# Patient Record
Sex: FEMALE | Race: WHITE | NOT HISPANIC OR LATINO | Employment: OTHER | URBAN - METROPOLITAN AREA
[De-identification: names, ages, dates, MRNs, and addresses within clinical notes are randomized per-mention and may not be internally consistent; named-entity substitution may affect disease eponyms.]

---

## 2023-04-25 ENCOUNTER — OFFICE VISIT (OUTPATIENT)
Dept: PRIMARY CARE | Facility: CLINIC | Age: 78
End: 2023-04-25
Payer: MEDICARE

## 2023-04-25 VITALS — HEART RATE: 109 BPM | SYSTOLIC BLOOD PRESSURE: 87 MMHG | TEMPERATURE: 97.6 F | DIASTOLIC BLOOD PRESSURE: 46 MMHG

## 2023-04-25 DIAGNOSIS — L29.9 PRURITUS: Primary | ICD-10-CM

## 2023-04-25 PROCEDURE — 1159F MED LIST DOCD IN RCRD: CPT | Performed by: INTERNAL MEDICINE

## 2023-04-25 PROCEDURE — 99214 OFFICE O/P EST MOD 30 MIN: CPT | Performed by: INTERNAL MEDICINE

## 2023-04-25 PROCEDURE — 1160F RVW MEDS BY RX/DR IN RCRD: CPT | Performed by: INTERNAL MEDICINE

## 2023-04-25 RX ORDER — DULOXETINE HYDROCHLORIDE 20 MG/1
CAPSULE, DELAYED RELEASE ORAL
COMMUNITY
End: 2023-12-20 | Stop reason: HOSPADM

## 2023-04-25 RX ORDER — PREDNISONE 20 MG/1
40 TABLET ORAL DAILY
Qty: 10 TABLET | Refills: 0 | Status: SHIPPED | OUTPATIENT
Start: 2023-04-25 | End: 2023-04-30

## 2023-04-25 RX ORDER — TOPIRAMATE 100 MG/1
1 TABLET, FILM COATED ORAL 2 TIMES DAILY
COMMUNITY
Start: 2021-09-30 | End: 2023-11-16

## 2023-04-25 RX ORDER — GABAPENTIN 800 MG/1
1 TABLET ORAL 3 TIMES DAILY
COMMUNITY
Start: 2022-02-24 | End: 2023-12-20 | Stop reason: HOSPADM

## 2023-04-25 RX ORDER — DIPHENHYDRAMINE HCL 25 MG
25 TABLET ORAL EVERY 6 HOURS
Qty: 60 TABLET | Refills: 1 | Status: SHIPPED | OUTPATIENT
Start: 2023-04-25 | End: 2023-12-20 | Stop reason: HOSPADM

## 2023-04-25 NOTE — PROGRESS NOTES
Subjective     Patient ID: Roby Qureshi is a 78 y.o. female who presents for itching body.  HPI    78F pt of Dr. Crespo here out of concern for diffuse pruritus. She states for the last 6-7 days she has been complaining of severe pruritus, that started on her head and went to her body, cannot even wear a bra. She presented to the ED on 4/16 out of concern for exacerbation of chronic back pain subsequently discharged. She cannot recall any inciting event, though she does note that her  may have changed the detergent of her laundry. She tried to bathe though without any improvement in her symptoms. There are no rashes that she could appreciate, does not live with anyone at home with similar rashes, has never experienced this in the past. Pruritus is severe and keeping her awake.  Denies constitutional symptoms, recent travel, changes in medications or substance abuse.     PMHx:  - Chronic constipation/fecal incontinence - overflow - seen by Dr. Baer thought likely overflow after spinal injury and subsequent fusion   - Scatica with history of multiple lumbar fusion surgeries on gabapentin, topamax,   - Depression/bipolar disorder on cymbalta 20mg     Social:   - NO alcohol tobacco, or drug use   - Lives at home alone with ck   - Daughter in [place], son in [place]. Friends are used as support.     ROS:  Review of systems as stated above, otherwise unremarkable.    Objective   Physical Exam  General: Alert and oriented, extremely uncomfortable in appearance   HEENT: No conjunctival erythema, no external facial lesions, thyroid exam unremarkable, moist mucus membranes no e/o oral lesions or pharygneal erythema.   Lungs: Breathing comfortably, no wheezes.   Skin: No rashes appreciated, diffuse excoriations appreciated on torso, upper back, upper arms and thighs without exudate or bleeding, no e/o active infection   Neuro: AAO x 3, answering questions appropriately, no obvious cranial nerve  deficits    Assessment/Plan   Problem List Items Addressed This Visit          Nervous    Pruritus - Primary     Unclear etiology possible change in her detergent no overt evidence of rash. She had recent bloodwork performed on 4/16 without concerning abnormalities   Will rx benadryl and prednisone for now, refer to dermatology.   If symptoms fail to improve with above measures would pursue further workup including thyroid function testing.          Relevant Medications    diphenhydrAMINE (Sominex) 25 mg tablet    predniSONE (Deltasone) 20 mg tablet    Other Relevant Orders    Referral to Dermatology

## 2023-04-25 NOTE — ASSESSMENT & PLAN NOTE
Unclear etiology possible change in her detergent no overt evidence of rash. She had recent bloodwork performed on 4/16 without concerning abnormalities   Will rx benadryl and prednisone for now, refer to dermatology.   If symptoms fail to improve with above measures would pursue further workup including thyroid function testing.

## 2023-05-15 ENCOUNTER — HOSPITAL ENCOUNTER (OUTPATIENT)
Dept: DATA CONVERSION | Facility: HOSPITAL | Age: 78
End: 2023-05-15
Attending: ANESTHESIOLOGY
Payer: COMMERCIAL

## 2023-05-15 DIAGNOSIS — M54.16 RADICULOPATHY, LUMBAR REGION: ICD-10-CM

## 2023-05-15 DIAGNOSIS — G47.30 SLEEP APNEA, UNSPECIFIED: ICD-10-CM

## 2023-05-15 DIAGNOSIS — M96.1 POSTLAMINECTOMY SYNDROME, NOT ELSEWHERE CLASSIFIED: ICD-10-CM

## 2023-07-24 ENCOUNTER — HOSPITAL ENCOUNTER (OUTPATIENT)
Dept: DATA CONVERSION | Facility: HOSPITAL | Age: 78
End: 2023-07-24
Attending: ANESTHESIOLOGY
Payer: COMMERCIAL

## 2023-07-24 DIAGNOSIS — M54.16 RADICULOPATHY, LUMBAR REGION: ICD-10-CM

## 2023-07-24 DIAGNOSIS — M96.1 POSTLAMINECTOMY SYNDROME, NOT ELSEWHERE CLASSIFIED: ICD-10-CM

## 2023-07-24 DIAGNOSIS — M51.16 INTERVERTEBRAL DISC DISORDERS WITH RADICULOPATHY, LUMBAR REGION: ICD-10-CM

## 2023-10-02 NOTE — OP NOTE
Post Operative Note:     Post-Procedure Diagnosis: Lumbar radiculitis, failed  back surgery syndrome, degenerative disc disease   Procedure: 1.   2.   3.   4.   5.   Surgeon: Luis   Resident/Fellow/Other Assistant: Carlos   Estimated Blood Loss (mL): none   Specimen: no   Findings: None     Operative Report Dictated:  Dictation: not applicable - note contains Operative  Report   Operative Report:    Preoperative diagnosis:  Lumbar radiculitis  Postoperative diagnosis:  Lumbar radiculitis, failed back surgery syndrome, lumbar disc disease  Procedure: Right-sided L5 lumbar transforaminal epidural steroid injection under fluoroscopic guidance  Surgeon: Brook Smith  Assistant:  Fellow  Anesthesia: Local, IV sedation    Complications: Apparently none    Clinical note: Roby is a 78-year-old female with a history of back pain and leg pain.  The patient has mostly had pain in the L5 distribution.  Based on her scan she did have a right-sided disc  issue at T12-L1 and she has some narrowing and disc disease at L5-S1.    Procedure note: The patient was met in the preoperative holding area after risks benefits and alternatives to procedure were discussed with the patient, informed consent was obtained. Patient brought back to the procedure room and placed in the prone  position on the fluoroscopy table. Area over the back was exposed, prepped, draped, in the usual sterile fashion.  Skin and subcutaneous tissues to the neuroforamen was anesthetized using 0.5% lidocaine.   Initially 22-gauge Sprotte needles were inserted in the skin and advanced  into the foramen on the right both at T12 and L5. Needle tip position was confirmed in AP oblique and lateral view.  Contrast was injected  which showed appropriate epidural spread at L5-T12 was little bit more lateral.  There was no intravascular or intrathecal uptake.   Patient noted that her exact symptoms were reproduced with the medication at L5 and therefore I elected  to just inject all the medication at this location.  A total of  2 mL of 0.5% lidocaine mixed with 10 mg dexamethasone was injected. Needle removed, bandage applied, patient tolerated the procedure well with no immediate complications.      Attestation:   Note Completion:  Attending Attestation I was present for the entire procedure         Electronic Signatures:  Brook Smith)  (Signed 24-Jul-2023 12:12)   Authored: Post Operative Note, Note Completion      Last Updated: 24-Jul-2023 12:12 by Brook Smith)

## 2023-10-02 NOTE — OP NOTE
Post Operative Note:     Post-Procedure Diagnosis: Failed back surgery syndrome,  lumbar   Procedure: 1.   2.   3.   4.   5.   Surgeon: Luis   Resident/Fellow/Other Assistant: Young   Estimated Blood Loss (mL): none   Specimen: no   Findings: None     Operative Report Dictated:  Dictation: not applicable - note contains Operative  Report   Operative Report:      Preoperative diagnosis/postoperative diagnosis: Failed back surgery syndrome, lumbar radiculitis  Procedure: Caudal epidural steroid injection under fluoroscopic guidance  Surgeon: Brook Smith  Assistant:  Fellow  Anesthesia: Local, IV sedation  Complications: Apparently none    Clinical note: Roby is a 78-year-old female with a history of back and leg symptoms.  The patient has had prior epidural injections with significant relief.  She is here today for repeat procedure.    Procedure note: The patient was met in the preoperative holding area after risks benefits and alternatives to procedure were discussed with the patient, informed consent was obtained. Patient brought back to the procedure room and placed in the prone  position on the fluoroscopy table. Area over low back and caudal space was exposed, prepped, draped, in the usual sterile fashion.  Sacral hiatus identified. Skin and subcutaneous tissues to the aforementioned location was anesthetized using 0.5% lidocaine.   23-gauge spinal needle was inserted in the skin and advanced.  Contrast was injected which showed appropriate epidural spread, no intravascular or intrathecal uptake. A total of 4 mL's of 1 % percent lidocaine mixed 5 mL of preservative-free normal saline and 40 mg of methylprednisolone was injected. Needle removed, bandage  applied, patient tolerated the procedure well with no immediate complications.      Attestation:   Note Completion:  Attending Attestation I was present for the entire procedure         Electronic Signatures:  Brook Smith)  (Signed  15-May-2023 10:15)   Authored: Post Operative Note, Note Completion      Last Updated: 15-May-2023 10:15 by Brook Smith)

## 2023-11-01 ENCOUNTER — TELEPHONE (OUTPATIENT)
Dept: RADIOLOGY | Facility: HOSPITAL | Age: 78
End: 2023-11-01

## 2023-11-01 DIAGNOSIS — M54.16 LUMBAR RADICULOPATHY: ICD-10-CM

## 2023-11-11 PROBLEM — M96.0 PSEUDARTHROSIS FOLLOWING SPINAL FUSION: Status: ACTIVE | Noted: 2023-11-11

## 2023-11-11 PROBLEM — S32.009K LUMBAR PSEUDOARTHROSIS: Status: ACTIVE | Noted: 2020-06-12

## 2023-11-11 PROBLEM — M54.10 RADICULOPATHY WITH LOWER EXTREMITY SYMPTOMS: Status: ACTIVE | Noted: 2023-11-11

## 2023-11-11 PROBLEM — R00.0 TACHYCARDIA: Status: ACTIVE | Noted: 2023-11-11

## 2023-11-11 PROBLEM — G57.90 NEUROPATHY, LEG: Status: ACTIVE | Noted: 2023-11-11

## 2023-11-11 PROBLEM — R53.83 MALAISE AND FATIGUE: Status: ACTIVE | Noted: 2023-11-11

## 2023-11-11 PROBLEM — R73.9 BORDERLINE HYPERGLYCEMIA: Status: ACTIVE | Noted: 2023-11-11

## 2023-11-11 PROBLEM — R53.83 FATIGUE: Status: ACTIVE | Noted: 2020-02-14

## 2023-11-11 PROBLEM — E44.1 MILD PROTEIN-CALORIE MALNUTRITION (MULTI): Status: ACTIVE | Noted: 2020-09-27

## 2023-11-11 PROBLEM — R15.9 INCONTINENCE OF FECES: Status: ACTIVE | Noted: 2017-01-26

## 2023-11-11 PROBLEM — K52.9 CHRONIC DIARRHEA: Status: ACTIVE | Noted: 2023-11-11

## 2023-11-11 PROBLEM — F31.0: Status: ACTIVE | Noted: 2023-11-11

## 2023-11-11 PROBLEM — M54.31 SCIATICA OF RIGHT SIDE: Status: ACTIVE | Noted: 2020-09-21

## 2023-11-11 PROBLEM — N39.3 STRESS INCONTINENCE, FEMALE: Status: ACTIVE | Noted: 2023-11-11

## 2023-11-11 PROBLEM — R53.81 MALAISE AND FATIGUE: Status: ACTIVE | Noted: 2023-11-11

## 2023-11-11 PROBLEM — G62.9 PERIPHERAL NERVE DISEASE: Status: ACTIVE | Noted: 2020-02-14

## 2023-11-11 PROBLEM — R30.0 DYSURIA: Status: ACTIVE | Noted: 2023-11-11

## 2023-11-11 PROBLEM — G89.29 CHRONIC PAIN: Status: ACTIVE | Noted: 2020-11-23

## 2023-11-11 PROBLEM — N93.9 VAGINAL BLEEDING: Status: ACTIVE | Noted: 2023-11-11

## 2023-11-11 PROBLEM — R45.851 SUICIDAL IDEATION: Status: ACTIVE | Noted: 2020-09-25

## 2023-11-11 PROBLEM — M54.18 RADICULOPATHY, SACRAL AND SACROCOCCYGEAL REGION: Status: ACTIVE | Noted: 2023-11-11

## 2023-11-11 PROBLEM — R26.9 ABNORMAL GAIT: Status: ACTIVE | Noted: 2020-02-14

## 2023-11-11 PROBLEM — M48.02 SPINAL STENOSIS OF CERVICAL REGION: Status: ACTIVE | Noted: 2020-09-26

## 2023-11-11 PROBLEM — K59.09 CHRONIC CONSTIPATION: Status: ACTIVE | Noted: 2020-02-14

## 2023-11-11 PROBLEM — T81.9XXA COMPLICATION OF SURGICAL PROCEDURE: Status: ACTIVE | Noted: 2023-11-11

## 2023-11-11 PROBLEM — M54.16 RIGHT LUMBAR RADICULITIS: Status: ACTIVE | Noted: 2023-11-11

## 2023-11-11 PROBLEM — K92.1 BLOODY STOOL: Status: ACTIVE | Noted: 2023-11-11

## 2023-11-11 PROBLEM — R42 DIZZINESS: Status: ACTIVE | Noted: 2023-11-11

## 2023-11-11 PROBLEM — F45.42 PAIN DISORDER ASSOCIATED WITH PSYCHOLOGICAL AND PHYSICAL FACTORS: Status: ACTIVE | Noted: 2023-11-11

## 2023-11-11 PROBLEM — E78.5 DYSLIPIDEMIA: Status: ACTIVE | Noted: 2023-11-11

## 2023-11-11 PROBLEM — M21.371 RIGHT FOOT DROP: Status: ACTIVE | Noted: 2020-02-14

## 2023-11-11 PROBLEM — I47.10 PAROXYSMAL SVT (SUPRAVENTRICULAR TACHYCARDIA) (CMS-HCC): Status: ACTIVE | Noted: 2023-11-11

## 2023-11-11 RX ORDER — METOPROLOL SUCCINATE 25 MG/1
1 TABLET, EXTENDED RELEASE ORAL DAILY
COMMUNITY
End: 2024-04-18

## 2023-11-11 RX ORDER — TIZANIDINE 4 MG/1
.5-1 TABLET ORAL 3 TIMES DAILY
COMMUNITY
Start: 2021-03-02 | End: 2024-01-14 | Stop reason: ENTERED-IN-ERROR

## 2023-11-11 RX ORDER — HYDROXYZINE HYDROCHLORIDE 50 MG/1
100 TABLET, FILM COATED ORAL 2 TIMES DAILY
COMMUNITY
End: 2024-01-14 | Stop reason: ENTERED-IN-ERROR

## 2023-11-11 RX ORDER — ZOLPIDEM TARTRATE 10 MG/1
1 TABLET ORAL NIGHTLY
Status: ON HOLD | COMMUNITY
Start: 2019-12-31 | End: 2023-12-20 | Stop reason: SDUPTHER

## 2023-11-11 RX ORDER — CHLORZOXAZONE 500 MG/1
1 TABLET ORAL 3 TIMES DAILY PRN
COMMUNITY
Start: 2021-06-22 | End: 2024-01-14 | Stop reason: ENTERED-IN-ERROR

## 2023-11-11 RX ORDER — VENLAFAXINE HYDROCHLORIDE 150 MG/1
300 TABLET, EXTENDED RELEASE ORAL
COMMUNITY
Start: 2019-11-10 | End: 2024-01-14 | Stop reason: ENTERED-IN-ERROR

## 2023-11-11 RX ORDER — CYCLOBENZAPRINE HCL 10 MG
1 TABLET ORAL EVERY 8 HOURS
COMMUNITY
Start: 2023-04-16 | End: 2023-12-20 | Stop reason: HOSPADM

## 2023-11-11 RX ORDER — IBUPROFEN 400 MG/1
1 TABLET ORAL EVERY 6 HOURS
COMMUNITY
Start: 2023-04-16 | End: 2024-01-24 | Stop reason: HOSPADM

## 2023-11-11 RX ORDER — HYDROXYZINE HYDROCHLORIDE 50 MG/1
2 TABLET, FILM COATED ORAL NIGHTLY PRN
Status: ON HOLD | COMMUNITY
Start: 2019-12-18 | End: 2023-12-29 | Stop reason: ENTERED-IN-ERROR

## 2023-11-11 RX ORDER — HYDROXYZINE PAMOATE 25 MG/1
25 CAPSULE ORAL DAILY PRN
Status: ON HOLD | COMMUNITY
Start: 2017-02-15 | End: 2023-12-29 | Stop reason: ENTERED-IN-ERROR

## 2023-11-11 RX ORDER — DULOXETIN HYDROCHLORIDE 60 MG/1
1 CAPSULE, DELAYED RELEASE ORAL EVERY MORNING
COMMUNITY
Start: 2022-05-20 | End: 2024-01-14 | Stop reason: ENTERED-IN-ERROR

## 2023-11-11 RX ORDER — METHYLCELLULOSE 500 MG/1
2 TABLET ORAL NIGHTLY
COMMUNITY
Start: 2022-09-06 | End: 2024-01-24 | Stop reason: HOSPADM

## 2023-11-13 ENCOUNTER — APPOINTMENT (OUTPATIENT)
Dept: RADIOLOGY | Facility: HOSPITAL | Age: 78
End: 2023-11-13
Payer: COMMERCIAL

## 2023-11-14 ENCOUNTER — HOSPITAL ENCOUNTER (EMERGENCY)
Facility: HOSPITAL | Age: 78
Discharge: HOME | End: 2023-11-14
Attending: EMERGENCY MEDICINE
Payer: COMMERCIAL

## 2023-11-14 ENCOUNTER — APPOINTMENT (OUTPATIENT)
Dept: RADIOLOGY | Facility: HOSPITAL | Age: 78
End: 2023-11-14
Payer: COMMERCIAL

## 2023-11-14 VITALS
OXYGEN SATURATION: 98 % | BODY MASS INDEX: 27.7 KG/M2 | SYSTOLIC BLOOD PRESSURE: 114 MMHG | HEIGHT: 60 IN | HEART RATE: 82 BPM | WEIGHT: 141.09 LBS | RESPIRATION RATE: 18 BRPM | DIASTOLIC BLOOD PRESSURE: 59 MMHG | TEMPERATURE: 97.5 F

## 2023-11-14 DIAGNOSIS — R19.7 DIARRHEA, UNSPECIFIED TYPE: Primary | ICD-10-CM

## 2023-11-14 LAB
ALBUMIN SERPL BCP-MCNC: 4.2 G/DL (ref 3.4–5)
ALP SERPL-CCNC: 96 U/L (ref 33–136)
ALT SERPL W P-5'-P-CCNC: 12 U/L (ref 7–45)
ANION GAP BLDV CALCULATED.4IONS-SCNC: 12 MMOL/L (ref 10–25)
ANION GAP SERPL CALC-SCNC: 13 MMOL/L (ref 10–20)
APTT PPP: 49 SECONDS (ref 27–38)
AST SERPL W P-5'-P-CCNC: 18 U/L (ref 9–39)
BASE EXCESS BLDV CALC-SCNC: -2.8 MMOL/L (ref -2–3)
BASOPHILS # BLD AUTO: 0.04 X10*3/UL (ref 0–0.1)
BASOPHILS NFR BLD AUTO: 0.4 %
BILIRUB SERPL-MCNC: 0.6 MG/DL (ref 0–1.2)
BODY TEMPERATURE: 37 DEGREES CELSIUS
BUN SERPL-MCNC: 20 MG/DL (ref 6–23)
CA-I BLDV-SCNC: 1.07 MMOL/L (ref 1.1–1.33)
CALCIUM SERPL-MCNC: 8.7 MG/DL (ref 8.6–10.3)
CARDIAC TROPONIN I PNL SERPL HS: 32 NG/L (ref 0–13)
CHLORIDE BLDV-SCNC: 105 MMOL/L (ref 98–107)
CHLORIDE SERPL-SCNC: 105 MMOL/L (ref 98–107)
CO2 SERPL-SCNC: 24 MMOL/L (ref 21–32)
CREAT SERPL-MCNC: 0.65 MG/DL (ref 0.5–1.05)
EOSINOPHIL # BLD AUTO: 0.1 X10*3/UL (ref 0–0.4)
EOSINOPHIL NFR BLD AUTO: 0.9 %
ERYTHROCYTE [DISTWIDTH] IN BLOOD BY AUTOMATED COUNT: 14.6 % (ref 11.5–14.5)
FLUAV RNA RESP QL NAA+PROBE: NOT DETECTED
FLUBV RNA RESP QL NAA+PROBE: NOT DETECTED
GFR SERPL CREATININE-BSD FRML MDRD: >90 ML/MIN/1.73M*2
GLUCOSE BLDV-MCNC: 92 MG/DL (ref 74–99)
GLUCOSE SERPL-MCNC: 87 MG/DL (ref 74–99)
HCO3 BLDV-SCNC: 23.2 MMOL/L (ref 22–26)
HCT VFR BLD AUTO: 43.3 % (ref 36–46)
HCT VFR BLD EST: 42 % (ref 36–46)
HGB BLD-MCNC: 14 G/DL (ref 12–16)
HGB BLDV-MCNC: 13.9 G/DL (ref 12–16)
IMM GRANULOCYTES # BLD AUTO: 0.04 X10*3/UL (ref 0–0.5)
IMM GRANULOCYTES NFR BLD AUTO: 0.4 % (ref 0–0.9)
INHALED O2 CONCENTRATION: 21 %
INR PPP: 1.1 (ref 0.9–1.1)
LACTATE BLDV-SCNC: 1.8 MMOL/L (ref 0.4–2)
LIPASE SERPL-CCNC: 15 U/L (ref 9–82)
LYMPHOCYTES # BLD AUTO: 3.33 X10*3/UL (ref 0.8–3)
LYMPHOCYTES NFR BLD AUTO: 30.9 %
MCH RBC QN AUTO: 29.2 PG (ref 26–34)
MCHC RBC AUTO-ENTMCNC: 32.3 G/DL (ref 32–36)
MCV RBC AUTO: 90 FL (ref 80–100)
MONOCYTES # BLD AUTO: 0.82 X10*3/UL (ref 0.05–0.8)
MONOCYTES NFR BLD AUTO: 7.6 %
NEUTROPHILS # BLD AUTO: 6.46 X10*3/UL (ref 1.6–5.5)
NEUTROPHILS NFR BLD AUTO: 59.8 %
NRBC BLD-RTO: 0 /100 WBCS (ref 0–0)
OXYHGB MFR BLDV: 49.6 % (ref 45–75)
PCO2 BLDV: 44 MM HG (ref 41–51)
PH BLDV: 7.33 PH (ref 7.33–7.43)
PLATELET # BLD AUTO: 269 X10*3/UL (ref 150–450)
PO2 BLDV: 35 MM HG (ref 35–45)
POTASSIUM BLDV-SCNC: 3.9 MMOL/L (ref 3.5–5.3)
POTASSIUM SERPL-SCNC: 4.1 MMOL/L (ref 3.5–5.3)
PROT SERPL-MCNC: 7.1 G/DL (ref 6.4–8.2)
PROTHROMBIN TIME: 12.9 SECONDS (ref 9.8–12.8)
RBC # BLD AUTO: 4.79 X10*6/UL (ref 4–5.2)
SAO2 % BLDV: 51 % (ref 45–75)
SARS-COV-2 RNA RESP QL NAA+PROBE: NOT DETECTED
SODIUM BLDV-SCNC: 136 MMOL/L (ref 136–145)
SODIUM SERPL-SCNC: 138 MMOL/L (ref 136–145)
WBC # BLD AUTO: 10.8 X10*3/UL (ref 4.4–11.3)

## 2023-11-14 PROCEDURE — 2500000004 HC RX 250 GENERAL PHARMACY W/ HCPCS (ALT 636 FOR OP/ED): Performed by: EMERGENCY MEDICINE

## 2023-11-14 PROCEDURE — 84484 ASSAY OF TROPONIN QUANT: CPT | Performed by: EMERGENCY MEDICINE

## 2023-11-14 PROCEDURE — 85025 COMPLETE CBC W/AUTO DIFF WBC: CPT | Performed by: EMERGENCY MEDICINE

## 2023-11-14 PROCEDURE — 99284 EMERGENCY DEPT VISIT MOD MDM: CPT | Mod: 25

## 2023-11-14 PROCEDURE — 36415 COLL VENOUS BLD VENIPUNCTURE: CPT | Performed by: EMERGENCY MEDICINE

## 2023-11-14 PROCEDURE — 2500000001 HC RX 250 WO HCPCS SELF ADMINISTERED DRUGS (ALT 637 FOR MEDICARE OP): Performed by: EMERGENCY MEDICINE

## 2023-11-14 PROCEDURE — 85730 THROMBOPLASTIN TIME PARTIAL: CPT | Performed by: EMERGENCY MEDICINE

## 2023-11-14 PROCEDURE — 74177 CT ABD & PELVIS W/CONTRAST: CPT | Performed by: RADIOLOGY

## 2023-11-14 PROCEDURE — 74177 CT ABD & PELVIS W/CONTRAST: CPT

## 2023-11-14 PROCEDURE — 84295 ASSAY OF SERUM SODIUM: CPT | Performed by: EMERGENCY MEDICINE

## 2023-11-14 PROCEDURE — 83690 ASSAY OF LIPASE: CPT | Performed by: EMERGENCY MEDICINE

## 2023-11-14 PROCEDURE — 87636 SARSCOV2 & INF A&B AMP PRB: CPT | Performed by: EMERGENCY MEDICINE

## 2023-11-14 PROCEDURE — 82330 ASSAY OF CALCIUM: CPT | Performed by: EMERGENCY MEDICINE

## 2023-11-14 PROCEDURE — 85610 PROTHROMBIN TIME: CPT | Performed by: EMERGENCY MEDICINE

## 2023-11-14 PROCEDURE — 99285 EMERGENCY DEPT VISIT HI MDM: CPT | Mod: 25 | Performed by: EMERGENCY MEDICINE

## 2023-11-14 PROCEDURE — 96361 HYDRATE IV INFUSION ADD-ON: CPT

## 2023-11-14 PROCEDURE — 2550000001 HC RX 255 CONTRASTS: Performed by: EMERGENCY MEDICINE

## 2023-11-14 PROCEDURE — 96375 TX/PRO/DX INJ NEW DRUG ADDON: CPT

## 2023-11-14 PROCEDURE — 96374 THER/PROPH/DIAG INJ IV PUSH: CPT | Mod: 59

## 2023-11-14 RX ORDER — ONDANSETRON HYDROCHLORIDE 2 MG/ML
4 INJECTION, SOLUTION INTRAVENOUS ONCE
Status: DISCONTINUED | OUTPATIENT
Start: 2023-11-14 | End: 2023-11-14 | Stop reason: HOSPADM

## 2023-11-14 RX ORDER — ONDANSETRON HYDROCHLORIDE 2 MG/ML
4 INJECTION, SOLUTION INTRAVENOUS ONCE
Status: COMPLETED | OUTPATIENT
Start: 2023-11-14 | End: 2023-11-14

## 2023-11-14 RX ORDER — OXYMETAZOLINE HCL 0.05 %
2 SPRAY, NON-AEROSOL (ML) NASAL ONCE
Status: COMPLETED | OUTPATIENT
Start: 2023-11-14 | End: 2023-11-14

## 2023-11-14 RX ORDER — MORPHINE SULFATE 4 MG/ML
4 INJECTION, SOLUTION INTRAMUSCULAR; INTRAVENOUS ONCE
Status: COMPLETED | OUTPATIENT
Start: 2023-11-14 | End: 2023-11-14

## 2023-11-14 RX ADMIN — MORPHINE SULFATE 4 MG: 4 INJECTION, SOLUTION INTRAMUSCULAR; INTRAVENOUS at 11:25

## 2023-11-14 RX ADMIN — IOHEXOL 75 ML: 350 INJECTION, SOLUTION INTRAVENOUS at 10:34

## 2023-11-14 RX ADMIN — OXYMETAZOLINE HYDROCHLORIDE 2 SPRAY: 0.05 SPRAY NASAL at 11:15

## 2023-11-14 RX ADMIN — ONDANSETRON 4 MG: 2 INJECTION INTRAMUSCULAR; INTRAVENOUS at 08:29

## 2023-11-14 RX ADMIN — SODIUM CHLORIDE 1000 ML: 9 INJECTION, SOLUTION INTRAVENOUS at 08:28

## 2023-11-14 ASSESSMENT — PAIN SCALES - GENERAL
PAINLEVEL_OUTOF10: 7
PAINLEVEL_OUTOF10: 4
PAINLEVEL_OUTOF10: 0 - NO PAIN

## 2023-11-14 ASSESSMENT — PAIN - FUNCTIONAL ASSESSMENT
PAIN_FUNCTIONAL_ASSESSMENT: 0-10

## 2023-11-14 ASSESSMENT — PAIN DESCRIPTION - LOCATION: LOCATION: BACK

## 2023-11-14 ASSESSMENT — PAIN DESCRIPTION - ORIENTATION: ORIENTATION: MID

## 2023-11-14 NOTE — ED PROVIDER NOTES
HPI   Chief Complaint   Patient presents with    Diarrhea     She has had flu symptoms x2 days last night she started with diarrhea       This is a 78-year-old woman with past medical history chronic constipation, sciatica, depression who does present with complaint of 2 days of feeling ill and then developing diarrhea for the last day.  Denies any cough.  Denies any chest pain.  Does have some diffuse abdominal cramping.  No dysuria hematuria flank pain.  No travel history.                        No data recorded                Patient History   Past Medical History:   Diagnosis Date    Personal history of other diseases of the respiratory system 09/14/2020    History of acute bronchitis    Unspecified fall, initial encounter 12/02/2021    Accidental fall, initial encounter     Past Surgical History:   Procedure Laterality Date    OTHER SURGICAL HISTORY  01/08/2020    Spinal surgery    OTHER SURGICAL HISTORY  01/08/2020    Hysterectomy    OTHER SURGICAL HISTORY  01/08/2020    Femur fracture repair     Family History   Problem Relation Name Age of Onset    Other (Cardiac Disorder) Mother          CABG; Defribillator in place    Coronary artery disease Mother      Other (Cardiac Disorder) Father          CABG; Defribillator in place    Coronary artery disease Father      Heart attack Father  54    Ovarian cancer Maternal Grandmother      Alzheimer's disease Other Aunt      Social History     Tobacco Use    Smoking status: Never    Smokeless tobacco: Never   Substance Use Topics    Alcohol use: Not Currently    Drug use: Never       Physical Exam   ED Triage Vitals [11/14/23 0724]   Temp Heart Rate Resp BP   36.4 °C (97.5 °F) 56 16 100/53      SpO2 Temp src Heart Rate Source Patient Position   -- -- -- --      BP Location FiO2 (%)     -- --       Physical Exam  Vitals and nursing note reviewed.   Constitutional:       Appearance: Normal appearance. She is obese. She is not toxic-appearing.   HENT:      Head:  Normocephalic and atraumatic.      Nose: Nose normal.      Mouth/Throat:      Mouth: Mucous membranes are dry.      Pharynx: Oropharynx is clear.   Eyes:      Extraocular Movements: Extraocular movements intact.      Conjunctiva/sclera: Conjunctivae normal.      Pupils: Pupils are equal, round, and reactive to light.   Cardiovascular:      Rate and Rhythm: Normal rate and regular rhythm.      Pulses: Normal pulses.      Heart sounds: Normal heart sounds.   Pulmonary:      Effort: Pulmonary effort is normal.      Breath sounds: Normal breath sounds.   Abdominal:      General: Abdomen is flat. There is no distension.      Tenderness: There is abdominal tenderness. There is no right CVA tenderness, left CVA tenderness, guarding or rebound.      Hernia: No hernia is present.   Musculoskeletal:         General: Normal range of motion.      Cervical back: Normal range of motion and neck supple.   Skin:     General: Skin is warm and dry.      Capillary Refill: Capillary refill takes less than 2 seconds.   Neurological:      General: No focal deficit present.      Mental Status: She is alert and oriented to person, place, and time. Mental status is at baseline.   Psychiatric:         Mood and Affect: Mood normal.         Behavior: Behavior normal.         Thought Content: Thought content normal.         ED Course & MDM   Diagnoses as of 11/17/23 1029   Diarrhea, unspecified type       Medical Decision Making  IV is placed and labs are drawn including CBC, CMP, lipase, UA, CT scan of the abdomen pelvis.  COVID and flu swabs are sent.  She is started on normal saline bolus.  Morphine 4 mg IV as well as Zofran 4 mg IV are provided.  COVID and flu swabs were negative.  There is no elevated white blood cell count for systemic infection.  There is no clinically significant anemia.There was cholelithiasis noted on the CT scan.  There was indeterminate lesion in the left kidney which may represent cyst.  She was advised of both of  these results with plan to follow-up with primary care physician.  Return precautions were discussed.  Her CMP did not show any acute kidney injury.  Troponin was noted at 32 however no acute chest pain or palpitations or shortness of breath.  No signs for acute ACS.      EKG showed normal sinus rhythm at a rate of 94 with no acute ischemic changes with some PACs.  No STEMI.  No s A-fib        Procedure  Procedures     Wesley Valadez MD  11/17/23 1874

## 2023-11-14 NOTE — DISCHARGE INSTRUCTIONS
Please use Imodium as directed to help with diarrhea for the next 1 to 2 days.  Please return ED for worsening pain, fever, vomiting or any other concerning symptoms.

## 2023-11-16 ENCOUNTER — APPOINTMENT (OUTPATIENT)
Dept: RADIOLOGY | Facility: HOSPITAL | Age: 78
End: 2023-11-16
Payer: COMMERCIAL

## 2023-11-16 ENCOUNTER — HOSPITAL ENCOUNTER (EMERGENCY)
Facility: HOSPITAL | Age: 78
Discharge: HOME | End: 2023-11-16
Attending: INTERNAL MEDICINE
Payer: COMMERCIAL

## 2023-11-16 VITALS
HEIGHT: 60 IN | RESPIRATION RATE: 18 BRPM | DIASTOLIC BLOOD PRESSURE: 70 MMHG | OXYGEN SATURATION: 97 % | WEIGHT: 140 LBS | HEART RATE: 76 BPM | SYSTOLIC BLOOD PRESSURE: 142 MMHG | TEMPERATURE: 98.6 F | BODY MASS INDEX: 27.48 KG/M2

## 2023-11-16 DIAGNOSIS — R10.2 PELVIC PAIN: Primary | ICD-10-CM

## 2023-11-16 LAB
ALBUMIN SERPL BCP-MCNC: 4.2 G/DL (ref 3.4–5)
ALP SERPL-CCNC: 86 U/L (ref 33–136)
ALT SERPL W P-5'-P-CCNC: 15 U/L (ref 7–45)
ANION GAP SERPL CALC-SCNC: 14 MMOL/L (ref 10–20)
AST SERPL W P-5'-P-CCNC: 22 U/L (ref 9–39)
BASOPHILS # BLD AUTO: 0.02 X10*3/UL (ref 0–0.1)
BASOPHILS NFR BLD AUTO: 0.2 %
BILIRUB SERPL-MCNC: 0.4 MG/DL (ref 0–1.2)
BUN SERPL-MCNC: 12 MG/DL (ref 6–23)
CALCIUM SERPL-MCNC: 8.9 MG/DL (ref 8.6–10.3)
CHLORIDE SERPL-SCNC: 109 MMOL/L (ref 98–107)
CO2 SERPL-SCNC: 22 MMOL/L (ref 21–32)
CREAT SERPL-MCNC: 0.6 MG/DL (ref 0.5–1.05)
EOSINOPHIL # BLD AUTO: 0.17 X10*3/UL (ref 0–0.4)
EOSINOPHIL NFR BLD AUTO: 1.7 %
ERYTHROCYTE [DISTWIDTH] IN BLOOD BY AUTOMATED COUNT: 14.6 % (ref 11.5–14.5)
GFR SERPL CREATININE-BSD FRML MDRD: >90 ML/MIN/1.73M*2
GLUCOSE SERPL-MCNC: 85 MG/DL (ref 74–99)
HCT VFR BLD AUTO: 38.9 % (ref 36–46)
HGB BLD-MCNC: 12.6 G/DL (ref 12–16)
IMM GRANULOCYTES # BLD AUTO: 0.02 X10*3/UL (ref 0–0.5)
IMM GRANULOCYTES NFR BLD AUTO: 0.2 % (ref 0–0.9)
LYMPHOCYTES # BLD AUTO: 3.82 X10*3/UL (ref 0.8–3)
LYMPHOCYTES NFR BLD AUTO: 39 %
MCH RBC QN AUTO: 29.3 PG (ref 26–34)
MCHC RBC AUTO-ENTMCNC: 32.4 G/DL (ref 32–36)
MCV RBC AUTO: 91 FL (ref 80–100)
MONOCYTES # BLD AUTO: 0.76 X10*3/UL (ref 0.05–0.8)
MONOCYTES NFR BLD AUTO: 7.8 %
NEUTROPHILS # BLD AUTO: 5 X10*3/UL (ref 1.6–5.5)
NEUTROPHILS NFR BLD AUTO: 51.1 %
NRBC BLD-RTO: 0 /100 WBCS (ref 0–0)
PLATELET # BLD AUTO: 290 X10*3/UL (ref 150–450)
POTASSIUM SERPL-SCNC: 3.7 MMOL/L (ref 3.5–5.3)
PROT SERPL-MCNC: 7.1 G/DL (ref 6.4–8.2)
RBC # BLD AUTO: 4.3 X10*6/UL (ref 4–5.2)
SODIUM SERPL-SCNC: 141 MMOL/L (ref 136–145)
WBC # BLD AUTO: 9.8 X10*3/UL (ref 4.4–11.3)

## 2023-11-16 PROCEDURE — 76856 US EXAM PELVIC COMPLETE: CPT | Mod: FOREIGN READ | Performed by: RADIOLOGY

## 2023-11-16 PROCEDURE — 96374 THER/PROPH/DIAG INJ IV PUSH: CPT

## 2023-11-16 PROCEDURE — 2500000004 HC RX 250 GENERAL PHARMACY W/ HCPCS (ALT 636 FOR OP/ED): Performed by: INTERNAL MEDICINE

## 2023-11-16 PROCEDURE — 76830 TRANSVAGINAL US NON-OB: CPT | Mod: FR

## 2023-11-16 PROCEDURE — 85025 COMPLETE CBC W/AUTO DIFF WBC: CPT | Performed by: INTERNAL MEDICINE

## 2023-11-16 PROCEDURE — 76856 US EXAM PELVIC COMPLETE: CPT | Mod: FR

## 2023-11-16 PROCEDURE — 99284 EMERGENCY DEPT VISIT MOD MDM: CPT | Mod: 25

## 2023-11-16 PROCEDURE — 76830 TRANSVAGINAL US NON-OB: CPT | Mod: XU | Performed by: RADIOLOGY

## 2023-11-16 PROCEDURE — 84075 ASSAY ALKALINE PHOSPHATASE: CPT | Performed by: INTERNAL MEDICINE

## 2023-11-16 PROCEDURE — 99285 EMERGENCY DEPT VISIT HI MDM: CPT | Mod: 25 | Performed by: INTERNAL MEDICINE

## 2023-11-16 PROCEDURE — 36415 COLL VENOUS BLD VENIPUNCTURE: CPT | Performed by: INTERNAL MEDICINE

## 2023-11-16 RX ORDER — ACETAMINOPHEN 325 MG/1
650 TABLET ORAL ONCE
Status: DISCONTINUED | OUTPATIENT
Start: 2023-11-16 | End: 2023-11-16 | Stop reason: HOSPADM

## 2023-11-16 RX ORDER — TOPIRAMATE 100 MG/1
100 TABLET, FILM COATED ORAL 2 TIMES DAILY
Qty: 10 TABLET | Refills: 0 | Status: SHIPPED | OUTPATIENT
Start: 2023-11-16 | End: 2023-11-20 | Stop reason: SDUPTHER

## 2023-11-16 RX ORDER — TOPIRAMATE 100 MG/1
100 TABLET, FILM COATED ORAL ONCE
Status: COMPLETED | OUTPATIENT
Start: 2023-11-16 | End: 2023-11-16

## 2023-11-16 RX ORDER — KETOROLAC TROMETHAMINE 30 MG/ML
15 INJECTION, SOLUTION INTRAMUSCULAR; INTRAVENOUS ONCE
Status: COMPLETED | OUTPATIENT
Start: 2023-11-16 | End: 2023-11-16

## 2023-11-16 RX ADMIN — TOPIRAMATE 100 MG: 100 TABLET, FILM COATED ORAL at 20:11

## 2023-11-16 RX ADMIN — KETOROLAC TROMETHAMINE 15 MG: 30 INJECTION, SOLUTION INTRAMUSCULAR at 18:37

## 2023-11-16 RX ADMIN — SODIUM CHLORIDE 1000 ML: 9 INJECTION, SOLUTION INTRAVENOUS at 18:38

## 2023-11-16 ASSESSMENT — COLUMBIA-SUICIDE SEVERITY RATING SCALE - C-SSRS
6. HAVE YOU EVER DONE ANYTHING, STARTED TO DO ANYTHING, OR PREPARED TO DO ANYTHING TO END YOUR LIFE?: NO
2. HAVE YOU ACTUALLY HAD ANY THOUGHTS OF KILLING YOURSELF?: NO
1. IN THE PAST MONTH, HAVE YOU WISHED YOU WERE DEAD OR WISHED YOU COULD GO TO SLEEP AND NOT WAKE UP?: NO

## 2023-11-16 ASSESSMENT — PAIN SCALES - GENERAL: PAINLEVEL_OUTOF10: 10 - WORST POSSIBLE PAIN

## 2023-11-16 ASSESSMENT — PAIN - FUNCTIONAL ASSESSMENT: PAIN_FUNCTIONAL_ASSESSMENT: 0-10

## 2023-11-16 NOTE — ED PROVIDER NOTES
"HPI     CC: Pelvic Pain (Pt complaining of lower pelvic pain, she states that she feels like her uterus is prolapsing. Was seen here 3 days ago for NVD)     HPI: Roby Qureshi is a 78 y.o. female with a history of chronic constipation, sciatica, depression who presents with 2 weeks of pelvic discomfort.  She endorses pain in her \"crotch\" with suprapubic pressure.  She has a history of a partial hysterectomy and feels like something is wrong with her remaining uterus.  She has had associated diarrhea over the past week.  She was seen in the ED 2 days ago and had a CT of the abdomen which showed the patient is status post hysterectomy no abnormalities noted to the pelvic organs.  She didn't tell them about the pelvic discomfort at the time. She has not taken anything for it.  She denies any dysuria, hematuria, nausea, vomiting, or other symptoms.     ROS: 10-point review of systems was performed and is otherwise negative except as noted in HPI.    Limitations to history: N/A    Independent Historians: N/A    External Records Reviewed: N/A    Past Medical History: Noncontributory except per HPI     Past Surgical History: Noncontributory except per HPI     Family History: Reviewed and noncontributory     Social History:  Denies tobacco. Denies ETOH. Denies illicit drugs.    Social Determinants Affecting Care: N/A    Allergies   Allergen Reactions    Oxycodone Itching       Home Meds:   Current Outpatient Medications   Medication Instructions    chlorzoxazone (Parafon Forte) 500 mg tablet 1 tablet, oral, 3 times daily PRN    cyclobenzaprine (Flexeril) 10 mg tablet 1 tablet, oral, Every 8 hours    Cymbalta 20 mg DR capsule oral    diphenhydrAMINE (BENADryl) 25 mg capsule TAKE 1 CAPSULE BY MOUTH FOUR TIMES A DAY (MORNING,NOON,EVENING AND BEFORE BEDTIME)    diphenhydrAMINE (SOMINEX) 25 mg, oral, Every 6 hours    DULoxetine (Cymbalta) 60 mg DR capsule 1 tablet, oral, Every morning    gabapentin (Neurontin) 800 mg " tablet 1 tablet, oral, 3 times daily    hydrOXYzine HCL (Atarax) 50 mg tablet 2 tablets, oral, Nightly PRN    hydrOXYzine HCL (ATARAX) 100 mg, oral, 2 times daily    hydrOXYzine pamoate (VISTARIL) 25 mg, oral, Daily PRN    ibuprofen 400 mg tablet 1 tablet, oral, Every 6 hours    Lactobacillus acidophilus (PROBIOTIC ORAL) 1 capsule, oral, 2 times daily    methylcellulose, laxative, (CitruceL) 500 mg tablet 2 tablets, oral, Nightly    metoprolol succinate XL (Toprol-XL) 25 mg 24 hr tablet 1 tablet, oral, Daily    tiZANidine (Zanaflex) 4 mg tablet 0.5-1 tablets, oral, 3 times daily    topiramate (TOPAMAX) 100 mg, oral, 2 times daily    venlafaxine 300 mg, oral    zolpidem (Ambien) 10 mg tablet 1 tablet, oral, Nightly        Physical Exam     ED Triage Vitals [11/16/23 1441]   Temp Heart Rate Resp BP   37 °C (98.6 °F) 76 18 139/72      SpO2 Temp src Heart Rate Source Patient Position   98 % -- -- --      BP Location FiO2 (%)     -- --         Heart Rate:  [76]   Temp:  [37 °C (98.6 °F)]   Resp:  [18]   BP: (123-143)/(70-74)   Height:  [152.4 cm (5')]   Weight:  [63.5 kg (140 lb)]   SpO2:  [96 %-98 %]      Physical Exam  Vitals and nursing note reviewed.     CONSTITUTIONAL: Well appearing, well nourished, in no acute distress.   HENMT: Head atraumatic. Airway patent. Nasal mucosa clear. Mouth with normal mucosa, clear oropharynx. Uvula midline. Neck supple.    EYES: Clear bilaterally, pupils equally round and reactive to light.   CARDIOVASCULAR: Normal rate, regular rhythm.  Heart sounds S1, S2.  No murmurs, rubs or gallops. Normal pulses. Capillary refill < 2 sec.   RESPIRATORY: No increased work of breathing. Breath sounds clear and equal bilaterally.  GASTROINTESTINAL: Abdomen soft, non-distended, non-tender. No rebound, no guarding. Normal bowel sounds. No palpable masses.  GENITOURINARY:  No CVA tenderness.  MUSCULOSKELETAL: Spine appears normal, range of motion is not limited, no muscle or joint tenderness. No  edema.   NEUROLOGICAL: Alert and oriented, no asymmetry, moving all extremities equally.  SKIN: Warm, dry and intact. No rash or notable lesions.  PSYCHIATRIC: Normal mood and affect.  HEME/LYMPH: No adenopathy or splenomegaly.    Diagnostic Results      ECG: ECGs read and interpreted by me. See ED Course, below, for interpretation.    Labs Reviewed   CBC WITH AUTO DIFFERENTIAL - Abnormal       Result Value    WBC 9.8      nRBC 0.0      RBC 4.30      Hemoglobin 12.6      Hematocrit 38.9      MCV 91      MCH 29.3      MCHC 32.4      RDW 14.6 (*)     Platelets 290      Neutrophils % 51.1      Immature Granulocytes %, Automated 0.2      Lymphocytes % 39.0      Monocytes % 7.8      Eosinophils % 1.7      Basophils % 0.2      Neutrophils Absolute 5.00      Immature Granulocytes Absolute, Automated 0.02      Lymphocytes Absolute 3.82 (*)     Monocytes Absolute 0.76      Eosinophils Absolute 0.17      Basophils Absolute 0.02     COMPREHENSIVE METABOLIC PANEL - Abnormal    Glucose 85      Sodium 141      Potassium 3.7      Chloride 109 (*)     Bicarbonate 22      Anion Gap 14      Urea Nitrogen 12      Creatinine 0.60      eGFR >90      Calcium 8.9      Albumin 4.2      Alkaline Phosphatase 86      Total Protein 7.1      AST 22      Bilirubin, Total 0.4      ALT 15           US PELVIS TRANSABDOMINAL WITH TRANSVAGINAL   Final Result   Surgically absent uterus.  No free fluid within the pelvis.   Nonvisualization of the bilateral ovaries.   Signed by Duke Rubio MD                    [place] Coma Scale Score: 15                  Procedure  Procedures    ED Course & MDM   Assessment/Plan:   Roby Qureshi is a 78 y.o. female with a history of chronic constipation, sciatica, depression who presents with 2 weeks of pelvic discomfort.  Abdominal exam is benign.  We will do pelvic exam and transvaginal ultrasound.  Possibilities include UTI, vaginal prolapse muscle strain, less likely acute intra-abdominal process with  "normal CT 2 days ago.    See below for details of ED course and ultimate disposition.    Medications   topiramate (Topamax) tablet 100 mg (100 mg oral Given 11/16/23 2011)   ketorolac (Toradol) injection 15 mg (15 mg intravenous Given 11/16/23 1837)   sodium chloride 0.9 % bolus 1,000 mL (0 mL intravenous Stopped 11/16/23 1908)        ED Course as of 11/17/23 1359   Thu Nov 16, 2023   1600 Labs unremarkable as above. Pelvic exam poorly tolerated, cervix not visualized but no gross abnormalities. Some tenderness over pubic arch bilaterally. Patient states she has chronic back pain/sciatica and this position is very uncomfortable for her with pain radiating down leg. [CG]   1830 TVUS IMPRESSION:  Surgically absent uterus.  No free fluid within the pelvis. Nonvisualization of the bilateral ovaries.   [CG]   2008 Patient feels better after toradol. Patient requesting leave. She has received a fluid bolus to help her provide a urine sample. She urinated in the toilet and did not collect it for the lab. \"I know I don't have a UTI.\" Patient perseverating on receiving her topamax which is being tubed up from the pharmacy. She requested a refill and I gave her a 5 day supply. With absence of urinary symptoms it is reasonable to discharge her at this time. Suspect possible pelvic arch pain related to lumbar radiculopathy. No history of trauma to suggest fracture or other bony abnormality. No red flags for spinal cord involvement. [CG]      ED Course User Index  [CG] Ana Driscoll MD         Diagnoses as of 11/17/23 1359   Pelvic pain       Disposition:   DISCHARGE.  The patient was discharged with both verbal and written anticipatory guidance and strict return precautions. Discharge diagnosis, instructions and plan were discussed and understood. I emphasized the importance of following up with their primary care provider within 24-48 hours and with any referrals in the timeframe recommended. At the time of discharge the " patient was comfortable and was in no apparent distress. Patient is aware of diagnostic uncertainty and was notified though testing is negative here, there is a very small chance that pathology may be missed.  The patient understands these risks and the patient/family understood to call 911 or return immediately to the emergency department if the symptoms worsen or if they have any additional concerns.      FOLLOW UP  Primary care provider in 1-2 days.      ED Prescriptions       Medication Sig Dispense Start Date End Date Auth. Provider    topiramate (Topamax) 100 mg tablet Take 1 tablet (100 mg) by mouth 2 times a day for 5 days. 10 tablet 11/16/2023 11/21/2023 MD Ana Hernandez MD  EM/IM/Peds    This note was dictated by speech recognition. Minor errors in transcription may be present.     Ana Driscoll MD  11/17/23 5093

## 2023-11-17 NOTE — DISCHARGE INSTRUCTIONS
You were seen today for pelvic pain.  Your evaluation was not concerning for an emergency at this time.  We gave you a refill on her Topamax that you requested.  Please see the attached information sheet for information about your condition, how to care for your condition at home, and reasons to return to the emergency department. Take any prescriptions written today as prescribed. You should call your primary care provider within 24 hours to tell them about today's visit, including any new medications or medication changes, as he or she may want to see you in the office for further evaluation. If you do not have a primary care provider, call  [phone] for an appointment. We offer in-person office visits as well as virtual options. Please do not hesitate to call  [phone] or return to the emergency department with any new or unresolved concerns or symptoms. Thank you for choosing [facility] for your care.

## 2023-11-20 ENCOUNTER — HOSPITAL ENCOUNTER (OUTPATIENT)
Dept: RADIOLOGY | Facility: HOSPITAL | Age: 78
Discharge: HOME | End: 2023-11-20
Payer: COMMERCIAL

## 2023-11-20 VITALS
HEART RATE: 179 BPM | RESPIRATION RATE: 18 BRPM | DIASTOLIC BLOOD PRESSURE: 66 MMHG | SYSTOLIC BLOOD PRESSURE: 96 MMHG | WEIGHT: 140 LBS | HEIGHT: 60 IN | TEMPERATURE: 97.4 F | OXYGEN SATURATION: 98 % | BODY MASS INDEX: 27.48 KG/M2

## 2023-11-20 DIAGNOSIS — M48.02 SPINAL STENOSIS OF CERVICAL REGION: ICD-10-CM

## 2023-11-20 DIAGNOSIS — R10.2 PELVIC PAIN: ICD-10-CM

## 2023-11-20 DIAGNOSIS — M54.16 LUMBAR RADICULOPATHY: ICD-10-CM

## 2023-11-20 PROCEDURE — 77003 FLUOROGUIDE FOR SPINE INJECT: CPT

## 2023-11-20 PROCEDURE — 64483 NJX AA&/STRD TFRM EPI L/S 1: CPT | Performed by: ANESTHESIOLOGY

## 2023-11-20 PROCEDURE — 64483 NJX AA&/STRD TFRM EPI L/S 1: CPT | Mod: RT | Performed by: ANESTHESIOLOGY

## 2023-11-20 PROCEDURE — 2500000004 HC RX 250 GENERAL PHARMACY W/ HCPCS (ALT 636 FOR OP/ED): Performed by: STUDENT IN AN ORGANIZED HEALTH CARE EDUCATION/TRAINING PROGRAM

## 2023-11-20 RX ORDER — SODIUM CHLORIDE, SODIUM LACTATE, POTASSIUM CHLORIDE, CALCIUM CHLORIDE 600; 310; 30; 20 MG/100ML; MG/100ML; MG/100ML; MG/100ML
100 INJECTION, SOLUTION INTRAVENOUS CONTINUOUS
Status: DISCONTINUED | OUTPATIENT
Start: 2023-11-20 | End: 2023-11-21 | Stop reason: HOSPADM

## 2023-11-20 RX ORDER — TOPIRAMATE 100 MG/1
100 TABLET, FILM COATED ORAL 2 TIMES DAILY
Qty: 60 TABLET | Refills: 6 | Status: SHIPPED | OUTPATIENT
Start: 2023-11-20 | End: 2023-12-27

## 2023-11-20 RX ORDER — MIDAZOLAM HYDROCHLORIDE 1 MG/ML
1 INJECTION, SOLUTION INTRAMUSCULAR; INTRAVENOUS AS NEEDED
Status: DISCONTINUED | OUTPATIENT
Start: 2023-11-20 | End: 2023-11-21 | Stop reason: HOSPADM

## 2023-11-20 RX ORDER — FENTANYL CITRATE 50 UG/ML
25 INJECTION, SOLUTION INTRAMUSCULAR; INTRAVENOUS ONCE
Status: DISCONTINUED | OUTPATIENT
Start: 2023-11-20 | End: 2023-11-21 | Stop reason: HOSPADM

## 2023-11-20 RX ADMIN — MIDAZOLAM HYDROCHLORIDE 2 MG: 1 INJECTION, SOLUTION INTRAMUSCULAR; INTRAVENOUS at 09:42

## 2023-11-20 ASSESSMENT — PAIN - FUNCTIONAL ASSESSMENT
PAIN_FUNCTIONAL_ASSESSMENT: 0-10
PAIN_FUNCTIONAL_ASSESSMENT: 0-10

## 2023-11-20 ASSESSMENT — PAIN SCALES - GENERAL
PAINLEVEL_OUTOF10: 9
PAINLEVEL_OUTOF10: 5 - MODERATE PAIN
PAINLEVEL_OUTOF10: 5 - MODERATE PAIN
PAINLEVEL_OUTOF10: 8
PAINLEVEL_OUTOF10: 5 - MODERATE PAIN
PAINLEVEL_OUTOF10: 10 - WORST POSSIBLE PAIN

## 2023-11-20 NOTE — PROCEDURES
Preoperative diagnosis/postoperative diagnosis: Lumbar radiculitis, failed back surgery syndrome  Procedure: Right-sided L5 lumbar transforaminal epidural steroid injection under fluoroscopic guidance  Surgeon: Brook Smith  Assistant:  Fellow, Rob Sena  Anesthesia: Local, IV sedation  Complications: Apparently none    Clinical note: Roby is a 78-year-old female with a history of prior back surgery and significant pain in her right buttock and into the leg.  She responded well to prior injection and is here today for the aforementioned procedure.    Procedure note: The patient was met in the preoperative holding area after risks benefits and alternatives to procedure were discussed with the patient, informed consent was obtained. Patient brought back to the procedure room and placed in the prone position on the fluoroscopy table. Area over the back was exposed, prepped, draped, in the usual sterile fashion.  Skin and subcutaneous tissues to the neuroforamen was anesthetized using 0.5% lidocaine.  A 22-gauge Sprotte needle was inserted in the skin and advanced into the foramen. Needle tip position was confirmed in AP oblique and lateral view.  Contrast was injected which showed some spread into the epidural space with also some lateral to it, the needle was medialized and in the final position there was very good and appropriate epidural spread, no intravascular or intrathecal uptake. A total of 1 mL of 0.5% lidocaine mixed with 10 mg dexamethasone was injected. Needle removed, bandage applied, patient tolerated the procedure well with no immediate complications.

## 2023-11-20 NOTE — H&P
History Of Present Illness  Roby Qureshi is a 78 y.o. female presents for procedure state below. Endorses no changes in past medical history or medical health since last seen in clinic.     Past Medical History  She has a past medical history of Personal history of other diseases of the respiratory system (09/14/2020) and Unspecified fall, initial encounter (12/02/2021).    Surgical History  She has a past surgical history that includes Other surgical history (01/08/2020); Other surgical history (01/08/2020); and Other surgical history (01/08/2020).     Social History  She reports that she has never smoked. She has never used smokeless tobacco. She reports that she does not currently use alcohol. She reports that she does not use drugs.    Family History  Family History   Problem Relation Name Age of Onset    Other (Cardiac Disorder) Mother          CABG; Defribillator in place    Coronary artery disease Mother      Other (Cardiac Disorder) Father          CABG; Defribillator in place    Coronary artery disease Father      Heart attack Father  54    Ovarian cancer Maternal Grandmother      Alzheimer's disease Other Aunt         Allergies  Oxycodone    Review of Symptoms:   Constitutional: Negative for chills, diaphoresis or fever  HENT: Negative for neck swelling  Eyes:.  Negative for eye pain  Respiratory:.  Negative for cough, shortness of breath or wheezing    Cardiovascular:.  Negative for chest pain or palpitations  Gastrointestinal:.  Negative for abdominal pain, nausea and vomiting  Genitourinary:.  Negative for urgency  Musculoskeletal:  Positive for back and leg pain. Positive for joint pain. Denies falls within the past 3 months.  Skin: Negative for wounds or itching   Neurological: Negative for dizziness, seizures, loss of consciousness and weakness  Endo/Heme/Allergies: Does not bruise/bleed easily  Psychiatric/Behavioral: Negative for depression. The patient does not appear anxious.        PHYSICAL EXAM  Vitals signs reviewed  Constitutional:       General: Not in acute distress     Appearance: Normal appearance. Not ill-appearing.  HENT:     Head: Normocephalic and atraumatic  Eyes:     Conjunctiva/sclera: Conjunctivae normal  Cardiovascular:     Rate and Rhythm: Normal rate and regular rhythm  Pulmonary:     Effort: No respiratory distress  Abdominal:     Palpations: Abdomen is soft  Musculoskeletal: ADKINS  Skin:     General: Skin is warm and dry  Neurological:     General: No focal deficit present  Psychiatric:         Mood and Affect: Mood normal         Behavior: Behavior normal     Last Recorded Vitals  There were no vitals taken for this visit.    Relevant Results  Current Outpatient Medications   Medication Instructions    chlorzoxazone (Parafon Forte) 500 mg tablet 1 tablet, oral, 3 times daily PRN    cyclobenzaprine (Flexeril) 10 mg tablet 1 tablet, oral, Every 8 hours    Cymbalta 20 mg DR capsule oral    diphenhydrAMINE (BENADryl) 25 mg capsule TAKE 1 CAPSULE BY MOUTH FOUR TIMES A DAY (MORNING,NOON,EVENING AND BEFORE BEDTIME)    diphenhydrAMINE (SOMINEX) 25 mg, oral, Every 6 hours    DULoxetine (Cymbalta) 60 mg DR capsule 1 tablet, oral, Every morning    gabapentin (Neurontin) 800 mg tablet 1 tablet, oral, 3 times daily    hydrOXYzine HCL (Atarax) 50 mg tablet 2 tablets, oral, Nightly PRN    hydrOXYzine HCL (ATARAX) 100 mg, oral, 2 times daily    hydrOXYzine pamoate (VISTARIL) 25 mg, oral, Daily PRN    ibuprofen 400 mg tablet 1 tablet, oral, Every 6 hours    Lactobacillus acidophilus (PROBIOTIC ORAL) 1 capsule, oral, 2 times daily    methylcellulose, laxative, (CitruceL) 500 mg tablet 2 tablets, oral, Nightly    metoprolol succinate XL (Toprol-XL) 25 mg 24 hr tablet 1 tablet, oral, Daily    tiZANidine (Zanaflex) 4 mg tablet 0.5-1 tablets, oral, 3 times daily    topiramate (TOPAMAX) 100 mg, oral, 2 times daily    venlafaxine 300 mg, oral    zolpidem (Ambien) 10 mg tablet 1 tablet, oral,  Nightly         MR lumbar spine wo IV contrast 07/03/2023    Narrative  Interpreted By:  ART WEST MD  MRN: [id number]  Patient Name: MAX JEONG    STUDY:  MRI L-SPINE WO; MRI T-SPINE WO    INDICATION:  pain  M96.1: Post-laminectomy syndrome; mid back pain  M54.9: Mid  back pain    COMPARISON:    Lumbar spine MRI 07/17/2021.    ACCESSION NUMBER(S):  [id number]; [id number]    ORDERING CLINICIAN:  LUCERO IVERSON    TECHNIQUE:  Multi-planar multi-sequential MR imaging of the thoracic and lumbar  spine was performed without the administration of intravenous  contrast.    FINDINGS:    THORACIC:  ALIGNMENT: Exaggeration of the usual thoracic kyphosis. Mild  anterolisthesis of T3 on T4.    VERTEBRAE: Partial fusion of the thoracolumbar spine detailed within  the lumbar section of the report. Mild anterior wedging of the T10  vertebral body without associated edema, likely representing chronic  compression deformity. No acute fracture or aggressive osseous  lesion. There is diffuse heterogeneity of marrow.    DISCS: Multilevel disc desiccation. Severe disc height loss at T9-10.    CORD: There is no intrinsic spinal cord signal abnormality.    EVALUATION OF INDIVIDUAL LEVELS DEMONSTRATES: Moderate bilateral  foraminal stenosis at T1-2 secondary to disc bulge and facet  hypertrophy. Mild bilateral foraminal stenosis at T2-3 secondary to  disc bulge and facet hypertrophy. Remaining levels of the lumbar  spine demonstrate normal caliber of the spinal canal and neural  foramina.    PARAVERTEBRAL SOFT TISSUES: The visualized paravertebral soft tissues  appear within normal limits.    LUMBAR:  ALIGNMENT: Focal kyphotic deformity at T10-11. Straightening of usual  lumbar lordosis.    VERTEBRAE: Status post posterior fusion of T10 through the sacrum.  Status post posterior decompression of L2-3 through L5-S1.    DISCS: Fusion across all disc levels.    CORD: The conus medullaris terminates at L1. There is no  intrinsic  spinal cord signal abnormality.    EVALUATION OF INDIVIDUAL LEVELS:    L1-2: Shallow disc bulge and facet hypertrophy mildly narrows the  spinal canal and bilateral foramina.    L2-3: Facet hypertrophy and osseous ridging mildly narrow bilateral  foramina. Spinal canal remains patent.    L3-4: Osseous ridging and facet hypertrophy results in minimal  narrowing of the neural foramina. Spinal canal is patent.    L4-5: Shallow disc bulge, facet hypertrophy, and osseous ridging  results in mild bilateral foraminal stenosis. Spinal canal is mildly  narrowed.    L5-S1: Disc bulge with facet hypertrophy results in moderate right  and mild left foraminal stenosis. Spinal canal is minimally narrowed.    PARAVERTEBRAL SOFT TISSUES: There is diffuse atrophy of the  paraspinal musculature. Partially visualized cystic lesion possibly  arising from the right kidney versus posterior aspect of the right  hepatic lobe. Consider ultrasound for further evaluation.    Impression  Status post posterior decompression of L2-3 through L5-S1 and  posterior fusion of T10 through the sacrum. Multilevel  mild-to-moderate degenerative change as detailed without evidence of  high-grade canal or definitive high-grade foraminal stenosis; limited  evaluation of the neural foramina secondary to artifact from hardware.    Chronic appearing anterior wedging of the T10 vertebral body. No  acute osseous abnormality.    Partially visualized cystic lesion possibly arising from the right  kidney, right adrenal gland, or posterior aspect of the right hepatic  lobe. Consider ultrasound for further evaluation.      MR LUMBAR SPINE WO IV CONTRAST 07/17/2021    Narrative  MRN: [id number]  Patient Name: MAX JEONG    STUDY:  MRI L-SPINE WO;  7/17/2021 4:36 pm    INDICATION:  worsening lumbar/sacral radiculopathy  AUC Justification: Other -  re-eval spinal hardware, nerve impingements..  Chronic low back pain  with several back surgeries and  instrumentation.    COMPARISON:  08/21/2019 MR    ACCESSION NUMBER(S):  [id number]    ORDERING CLINICIAN:  MALI TIRADO    TECHNIQUE:  Sagittal T1, T2, STIR, axial T1 and T2 weighted images of the lumbar  spine were acquired.    FINDINGS:  Alignment: There are 5 lumbar type vertebrae.    The prior posterior fusion has been revised select: Pedicle screws  now extend on the right from T10-L2 and from L4-S1 with pedicle  screws extending on the left from T10 through L3 and from L5-S1.    Vertebrae/Intervertebral Discs: The vertebral bodies demonstrate  expected height.The marrow signal is within normal limits. The  intervertebral discs also demonstrate normal signal and morphology.    Conus: The lower thoracic cord appears unremarkable. The conus  terminates at .    T9-10: The thecal sac is indented by disc bulge on the sagittal  images with bulge nearly reaching the cord. This area is obscured by  metallic artifact.    T10-11: No stenosis is noted on the sagittal images.    T11-12: No stenosis is noted.    T12-L1: The right side of the spinal canal and right lateral recess  are mildly stenosed by disc protrusions similar to the prior exam.    L1-2: The lateral recesses and spinal canal are mildly stenosed by  facet hypertrophy and disc bulge. The foramina are mildly stenosed  more so on the left well. The thecal sac bulges into the laminectomy  defect.    L2-3: The lateral recesses are mildly stenosed by facet hypertrophy.  The thecal sac bulges into the laminectomy defect. The neural  foramina are mildly stenosed by facet hypertrophy.    L3-4: No stenosis is noted.    L4-5: The lateral recesses are mildly stenosed by facet hypertrophy.  The right neural foramen is mildly stenosed by spurring as well,  unchanged.    L5-S1: The lateral recesses are mildly stenosed by endplate spurring.    The posterior right hepatic lobe has a 5.5 cm multiloculated cyst  similar to the prior study. A 1-2 cm left renal cyst remains as  well.    Impression  Multilevel degenerative changes are present as noted with degrees  foraminal/recess/canal stenosis similar to the prior exam. The prior  posterior fusion has been extended superiorly to T10.    Multi cystic hepatic cyst similar to prior exam.     No image results found.       1. Lumbar radiculopathy  FL pain management TC    FL pain management TC    Transforaminal    Transforaminal           ASSESSMENT/PLAN  Roby Qureshi is a 78 y.o. female presents for lumbar transforaminal epidural steroid injection    Our plan is as follows:  - Follow In pain clinic  - Continue to participate in physical therapy as well as physician directed home exercises  - Continue pain medications as prescribed       Rob Sena MD

## 2023-11-29 ENCOUNTER — HOSPITAL ENCOUNTER (EMERGENCY)
Facility: HOSPITAL | Age: 78
Discharge: HOME | End: 2023-11-29
Attending: EMERGENCY MEDICINE
Payer: COMMERCIAL

## 2023-11-29 ENCOUNTER — APPOINTMENT (OUTPATIENT)
Dept: RADIOLOGY | Facility: HOSPITAL | Age: 78
End: 2023-11-29
Payer: COMMERCIAL

## 2023-11-29 ENCOUNTER — TELEPHONE (OUTPATIENT)
Dept: RADIOLOGY | Facility: HOSPITAL | Age: 78
End: 2023-11-29

## 2023-11-29 ENCOUNTER — PHARMACY VISIT (OUTPATIENT)
Dept: PHARMACY | Facility: CLINIC | Age: 78
End: 2023-11-29
Payer: COMMERCIAL

## 2023-11-29 VITALS
HEART RATE: 69 BPM | WEIGHT: 140 LBS | HEIGHT: 60 IN | SYSTOLIC BLOOD PRESSURE: 167 MMHG | OXYGEN SATURATION: 97 % | RESPIRATION RATE: 18 BRPM | TEMPERATURE: 98.1 F | DIASTOLIC BLOOD PRESSURE: 76 MMHG | BODY MASS INDEX: 27.48 KG/M2

## 2023-11-29 DIAGNOSIS — K59.09 OTHER CONSTIPATION: Primary | ICD-10-CM

## 2023-11-29 PROCEDURE — 96372 THER/PROPH/DIAG INJ SC/IM: CPT

## 2023-11-29 PROCEDURE — 99283 EMERGENCY DEPT VISIT LOW MDM: CPT | Mod: 25

## 2023-11-29 PROCEDURE — 2500000004 HC RX 250 GENERAL PHARMACY W/ HCPCS (ALT 636 FOR OP/ED)

## 2023-11-29 PROCEDURE — 99284 EMERGENCY DEPT VISIT MOD MDM: CPT | Performed by: EMERGENCY MEDICINE

## 2023-11-29 RX ORDER — KETOROLAC TROMETHAMINE 30 MG/ML
INJECTION, SOLUTION INTRAMUSCULAR; INTRAVENOUS
Status: COMPLETED
Start: 2023-11-29 | End: 2023-11-29

## 2023-11-29 RX ORDER — KETOROLAC TROMETHAMINE 30 MG/ML
30 INJECTION, SOLUTION INTRAMUSCULAR; INTRAVENOUS ONCE
Status: COMPLETED | OUTPATIENT
Start: 2023-11-29 | End: 2023-11-29

## 2023-11-29 RX ORDER — POLYETHYLENE GLYCOL 3350 17 G/17G
17 POWDER, FOR SOLUTION ORAL DAILY
Qty: 3 PACKET | Refills: 0 | Status: SHIPPED | OUTPATIENT
Start: 2023-11-29 | End: 2023-12-02

## 2023-11-29 RX ADMIN — KETOROLAC TROMETHAMINE 30 MG: 30 INJECTION, SOLUTION INTRAMUSCULAR; INTRAVENOUS at 15:54

## 2023-11-29 RX ADMIN — KETOROLAC TROMETHAMINE 30 MG: 30 INJECTION, SOLUTION INTRAMUSCULAR at 15:54

## 2023-11-29 NOTE — ED PROVIDER NOTES
HPI   Chief Complaint   Patient presents with    Constipation     Pt here from home for constipation, last BM today but small amount. Pt recently had diarrhea and took immodium, and thinks it constipated her. Pt also complaining of body wide pain       HPI  Patient is a 78-year-old female with a past medical history significant for chronic back pain, lumbar radiculopathy currently under the care of Dr. Smith with pain management who presents emergency room with chief complaint of chronic pain and constipation.  Patient states that last week she had diarrhea and she took Imodium and now can go to the bathroom.  She did have a bowel movement today that was nonbloody and nonmelanotic informed but she states that it was not enough.  We discussed a rectal exam to determine if she had rectal impaction but she states that she was seen by GI last week and had what sounds like a colonoscopy and that it was uncomfortable so she is declining.  Additionally, she states that she was seen for this here in the past as well including once earlier this month.  She tried calling her pain control doctor because they tried shots to the right buttock as well as gabapentin and other medications which are not working.  She states that they are not calling her back.  Additionally, she states that her primary care physician is also nowhere to be found.  She does endorse having a contract with pain management.      PMHx: As above but also includes bipolar disorder, chronic constipation, chronic diarrhea, lumbar pseudoarthrosis, spinal stenosis, pain disorder associated with psychological and physical factors, neuropathy of the leg, radiculopathy with lower extremity symptoms, right foot drop, sciatica on the right side.  PSHx: Hysterectomy, spinal surgery  FamilyHx: Denies pertinent  SocialHx: Denies  Allergies: Oxycodone  Medications: See Medication Reconciliation     ROS  As above but otherwise denies    Physical Exam    GENERAL: Awake  and Alert, No Acute Distress  HEENT: AT/NC, PERRL, EOMI, Normal Oropharynx, No Signs of Dehydration  NECK: Normal Inspection, No JVD  CARDIOVASCULAR: RRR, No M/R/G  RESPIRATORY: CTA Bilaterally, No Wheezes, Rales or Rhonchi, Chest Wall Non-tender  ABDOMEN: Soft, non-tender abdomen, Normal Bowel Sounds, No Distention  BACK: No C, T, L-spine tenderness.  No CVA Tenderness  SKIN: Normal Color, Warm, Dry, No Rashes   EXTREMITIES: Non-Tender, Full ROM, No Pedal Edema  NEURO: A&O x 3, Normal Motor and Sensation, anxious mood and Affect    Nursing Assessment and Vitals Reviewed    Medical Decision  Patient seen and evaluated for multiple complaints as described above including constipation of a couple of days duration status post diarrhea and taking Imodium without any nausea vomiting or abdominal pain.  She also endorses chronic low back and right lower extremity pain.  Physical exam reveals an anxious appearing female in no acute distress.  Lungs are clear and heart is regular in rhythm.  Abdomen is actually soft, nontender nondistended.  She has no C, T, L-spine tenderness to palpation and I am unable to reproduce the pain in the right lower extremity.  Straight leg raise is negative bilaterally.  She is neurologically intact without complaints of urinary retention, incontinence or saddle anesthesia.  We discussed performing a rectal exam but she states that GI already did this and is declining because it was uncomfortable.  As these complaints are chronic to patient we will hold off on lab work or advanced imaging.  Will obtain a KUB and attempt to contact her pain management physician.    Review of records shows an H&P from 20 November by her pain management doctor.  She had a transforaminal epidural steroid injection.  She was instructed to follow-up in pain clinic.  She was also given exercises to perform at home.    Review of records also shows visits to our ED last week for chronic constipation, sciatica.  She was  also having pelvic pain at that time.  She felt improved after Toradol and was discharged home.  She was given Topamax as patient was due for this and was given a refill for it.    While awaiting callback from pain management team and after ordering Toradol patient suddenly decided to go home.  She does want to have her Toradol but is declining a KUB.  She is given a prescription for MiraLAX and she will follow-up with pain management and her primary care doctor.  She is not without neurologic deficits, well-appearing and in no acute distress and ambulatory at her baseline.  She is discharged in stable condition with education on signs and symptoms that should prompt immediate turn to emergency room.                            [place] Coma Scale Score: 15                  Patient History   Past Medical History:   Diagnosis Date    Personal history of other diseases of the respiratory system 09/14/2020    History of acute bronchitis    Unspecified fall, initial encounter 12/02/2021    Accidental fall, initial encounter     Past Surgical History:   Procedure Laterality Date    OTHER SURGICAL HISTORY  01/08/2020    Spinal surgery    OTHER SURGICAL HISTORY  01/08/2020    Hysterectomy    OTHER SURGICAL HISTORY  01/08/2020    Femur fracture repair     Family History   Problem Relation Name Age of Onset    Other (Cardiac Disorder) Mother          CABG; Defribillator in place    Coronary artery disease Mother      Other (Cardiac Disorder) Father          CABG; Defribillator in place    Coronary artery disease Father      Heart attack Father  54    Ovarian cancer Maternal Grandmother      Alzheimer's disease Other Aunt      Social History     Tobacco Use    Smoking status: Never    Smokeless tobacco: Never   Substance Use Topics    Alcohol use: Not Currently    Drug use: Never       Physical Exam   ED Triage Vitals [11/29/23 1401]   Temp Heart Rate Resp BP   36.7 °C (98.1 °F) 76 18 169/82      SpO2 Temp src Heart Rate Source  Patient Position   98 % -- -- --      BP Location FiO2 (%)     -- --       Physical Exam    ED Course & MDM   Diagnoses as of 11/29/23 1925   Other constipation       Medical Decision Making      Procedure  Procedures     Karena Lipscomb MD  11/29/23 9933

## 2023-11-29 NOTE — DISCHARGE INSTRUCTIONS
You may take medication as indicated for length of time instructed.  Schedule follow-up appointment with pain control medicine and your primary care doctor.  Return immediately if concerning symptoms, as discussed.

## 2023-11-30 DIAGNOSIS — M54.16 RIGHT LUMBAR RADICULITIS: ICD-10-CM

## 2023-12-01 RX ORDER — TRAMADOL HYDROCHLORIDE 50 MG/1
50 TABLET ORAL 3 TIMES DAILY PRN
Qty: 21 TABLET | Refills: 0 | Status: SHIPPED | OUTPATIENT
Start: 2023-12-01 | End: 2023-12-12 | Stop reason: SDUPTHER

## 2023-12-12 DIAGNOSIS — M54.16 RIGHT LUMBAR RADICULITIS: ICD-10-CM

## 2023-12-12 RX ORDER — TRAMADOL HYDROCHLORIDE 50 MG/1
50 TABLET ORAL 3 TIMES DAILY PRN
Qty: 21 TABLET | Refills: 0 | Status: SHIPPED | OUTPATIENT
Start: 2023-12-12 | End: 2023-12-20 | Stop reason: HOSPADM

## 2023-12-14 ENCOUNTER — APPOINTMENT (OUTPATIENT)
Dept: CARDIOLOGY | Facility: HOSPITAL | Age: 78
End: 2023-12-14
Payer: COMMERCIAL

## 2023-12-14 ENCOUNTER — APPOINTMENT (OUTPATIENT)
Dept: RADIOLOGY | Facility: HOSPITAL | Age: 78
End: 2023-12-14
Payer: COMMERCIAL

## 2023-12-14 ENCOUNTER — HOSPITAL ENCOUNTER (EMERGENCY)
Facility: HOSPITAL | Age: 78
Discharge: HOME | End: 2023-12-15
Attending: EMERGENCY MEDICINE
Payer: COMMERCIAL

## 2023-12-14 DIAGNOSIS — L29.9 PRURITUS: ICD-10-CM

## 2023-12-14 DIAGNOSIS — M54.89 OTHER BACK PAIN, UNSPECIFIED CHRONICITY: Primary | ICD-10-CM

## 2023-12-14 DIAGNOSIS — I47.10 SVT (SUPRAVENTRICULAR TACHYCARDIA) (CMS-HCC): ICD-10-CM

## 2023-12-14 LAB
ALBUMIN SERPL BCP-MCNC: 4.2 G/DL (ref 3.4–5)
ALP SERPL-CCNC: 93 U/L (ref 33–136)
ALT SERPL W P-5'-P-CCNC: 13 U/L (ref 7–45)
ANION GAP SERPL CALC-SCNC: 27 MMOL/L (ref 10–20)
AST SERPL W P-5'-P-CCNC: 25 U/L (ref 9–39)
BASOPHILS # BLD AUTO: 0.04 X10*3/UL (ref 0–0.1)
BASOPHILS NFR BLD AUTO: 0.4 %
BILIRUB SERPL-MCNC: 0.5 MG/DL (ref 0–1.2)
BUN SERPL-MCNC: 11 MG/DL (ref 6–23)
CALCIUM SERPL-MCNC: 9.1 MG/DL (ref 8.6–10.3)
CARDIAC TROPONIN I PNL SERPL HS: 16 NG/L (ref 0–13)
CARDIAC TROPONIN I PNL SERPL HS: 26 NG/L (ref 0–13)
CHLORIDE SERPL-SCNC: 102 MMOL/L (ref 98–107)
CO2 SERPL-SCNC: 13 MMOL/L (ref 21–32)
CREAT SERPL-MCNC: 0.56 MG/DL (ref 0.5–1.05)
EOSINOPHIL # BLD AUTO: 0.12 X10*3/UL (ref 0–0.4)
EOSINOPHIL NFR BLD AUTO: 1.1 %
ERYTHROCYTE [DISTWIDTH] IN BLOOD BY AUTOMATED COUNT: 15 % (ref 11.5–14.5)
GFR SERPL CREATININE-BSD FRML MDRD: >90 ML/MIN/1.73M*2
GLUCOSE SERPL-MCNC: 69 MG/DL (ref 74–99)
HCT VFR BLD AUTO: 43.9 % (ref 36–46)
HGB BLD-MCNC: 14.2 G/DL (ref 12–16)
IMM GRANULOCYTES # BLD AUTO: 0.05 X10*3/UL (ref 0–0.5)
IMM GRANULOCYTES NFR BLD AUTO: 0.5 % (ref 0–0.9)
LYMPHOCYTES # BLD AUTO: 4.2 X10*3/UL (ref 0.8–3)
LYMPHOCYTES NFR BLD AUTO: 39.8 %
MCH RBC QN AUTO: 29.3 PG (ref 26–34)
MCHC RBC AUTO-ENTMCNC: 32.3 G/DL (ref 32–36)
MCV RBC AUTO: 91 FL (ref 80–100)
MONOCYTES # BLD AUTO: 0.79 X10*3/UL (ref 0.05–0.8)
MONOCYTES NFR BLD AUTO: 7.5 %
NEUTROPHILS # BLD AUTO: 5.35 X10*3/UL (ref 1.6–5.5)
NEUTROPHILS NFR BLD AUTO: 50.7 %
NRBC BLD-RTO: 0 /100 WBCS (ref 0–0)
PLATELET # BLD AUTO: 226 X10*3/UL (ref 150–450)
POTASSIUM SERPL-SCNC: 3.8 MMOL/L (ref 3.5–5.3)
PROT SERPL-MCNC: 7.1 G/DL (ref 6.4–8.2)
RBC # BLD AUTO: 4.84 X10*6/UL (ref 4–5.2)
SODIUM SERPL-SCNC: 138 MMOL/L (ref 136–145)
WBC # BLD AUTO: 10.6 X10*3/UL (ref 4.4–11.3)

## 2023-12-14 PROCEDURE — 84484 ASSAY OF TROPONIN QUANT: CPT | Performed by: EMERGENCY MEDICINE

## 2023-12-14 PROCEDURE — 72131 CT LUMBAR SPINE W/O DYE: CPT

## 2023-12-14 PROCEDURE — 96375 TX/PRO/DX INJ NEW DRUG ADDON: CPT

## 2023-12-14 PROCEDURE — 99285 EMERGENCY DEPT VISIT HI MDM: CPT | Mod: 25 | Performed by: EMERGENCY MEDICINE

## 2023-12-14 PROCEDURE — 2500000004 HC RX 250 GENERAL PHARMACY W/ HCPCS (ALT 636 FOR OP/ED): Performed by: EMERGENCY MEDICINE

## 2023-12-14 PROCEDURE — 36415 COLL VENOUS BLD VENIPUNCTURE: CPT | Performed by: EMERGENCY MEDICINE

## 2023-12-14 PROCEDURE — 71045 X-RAY EXAM CHEST 1 VIEW: CPT | Mod: FOREIGN READ | Performed by: RADIOLOGY

## 2023-12-14 PROCEDURE — 96376 TX/PRO/DX INJ SAME DRUG ADON: CPT

## 2023-12-14 PROCEDURE — 72125 CT NECK SPINE W/O DYE: CPT

## 2023-12-14 PROCEDURE — 85025 COMPLETE CBC W/AUTO DIFF WBC: CPT | Performed by: EMERGENCY MEDICINE

## 2023-12-14 PROCEDURE — 71045 X-RAY EXAM CHEST 1 VIEW: CPT | Mod: FY,FR

## 2023-12-14 PROCEDURE — 72125 CT NECK SPINE W/O DYE: CPT | Performed by: RADIOLOGY

## 2023-12-14 PROCEDURE — 96374 THER/PROPH/DIAG INJ IV PUSH: CPT

## 2023-12-14 PROCEDURE — 72128 CT CHEST SPINE W/O DYE: CPT | Performed by: RADIOLOGY

## 2023-12-14 PROCEDURE — 72128 CT CHEST SPINE W/O DYE: CPT

## 2023-12-14 PROCEDURE — 80053 COMPREHEN METABOLIC PANEL: CPT | Performed by: EMERGENCY MEDICINE

## 2023-12-14 PROCEDURE — 72131 CT LUMBAR SPINE W/O DYE: CPT | Performed by: RADIOLOGY

## 2023-12-14 PROCEDURE — 93005 ELECTROCARDIOGRAM TRACING: CPT

## 2023-12-14 RX ORDER — ADENOSINE 3 MG/ML
12 INJECTION, SOLUTION INTRAVENOUS ONCE
Status: COMPLETED | OUTPATIENT
Start: 2023-12-14 | End: 2023-12-14

## 2023-12-14 RX ORDER — DIPHENHYDRAMINE HYDROCHLORIDE 50 MG/ML
25 INJECTION INTRAMUSCULAR; INTRAVENOUS ONCE
Status: COMPLETED | OUTPATIENT
Start: 2023-12-15 | End: 2023-12-15

## 2023-12-14 RX ORDER — ADENOSINE 3 MG/ML
INJECTION, SOLUTION INTRAVENOUS
Status: COMPLETED
Start: 2023-12-14 | End: 2023-12-14

## 2023-12-14 RX ORDER — ADENOSINE 3 MG/ML
INJECTION, SOLUTION INTRAVENOUS
Status: DISCONTINUED
Start: 2023-12-14 | End: 2023-12-14 | Stop reason: WASHOUT

## 2023-12-14 RX ORDER — DIPHENHYDRAMINE HYDROCHLORIDE 50 MG/ML
50 INJECTION INTRAMUSCULAR; INTRAVENOUS ONCE
Status: COMPLETED | OUTPATIENT
Start: 2023-12-14 | End: 2023-12-14

## 2023-12-14 RX ORDER — ONDANSETRON HYDROCHLORIDE 2 MG/ML
4 INJECTION, SOLUTION INTRAVENOUS EVERY 30 MIN PRN
Status: DISCONTINUED | OUTPATIENT
Start: 2023-12-14 | End: 2023-12-15 | Stop reason: HOSPADM

## 2023-12-14 RX ORDER — HYDROMORPHONE HYDROCHLORIDE 1 MG/ML
1 INJECTION, SOLUTION INTRAMUSCULAR; INTRAVENOUS; SUBCUTANEOUS EVERY 30 MIN PRN
Status: DISCONTINUED | OUTPATIENT
Start: 2023-12-14 | End: 2023-12-15 | Stop reason: HOSPADM

## 2023-12-14 RX ADMIN — ONDANSETRON 4 MG: 2 INJECTION INTRAMUSCULAR; INTRAVENOUS at 20:05

## 2023-12-14 RX ADMIN — ADENOSINE: 3 INJECTION, SOLUTION INTRAVENOUS at 19:40

## 2023-12-14 RX ADMIN — DIPHENHYDRAMINE HYDROCHLORIDE 50 MG: 50 INJECTION, SOLUTION INTRAMUSCULAR; INTRAVENOUS at 20:25

## 2023-12-14 RX ADMIN — HYDROMORPHONE HYDROCHLORIDE 1 MG: 1 INJECTION, SOLUTION INTRAMUSCULAR; INTRAVENOUS; SUBCUTANEOUS at 20:05

## 2023-12-14 ASSESSMENT — ENCOUNTER SYMPTOMS
COLOR CHANGE: 0
COUGH: 0
DYSURIA: 0
CHILLS: 0
FEVER: 0
ABDOMINAL PAIN: 0
SEIZURES: 0
BACK PAIN: 1
EYE PAIN: 0
HEMATURIA: 0
SORE THROAT: 0
SHORTNESS OF BREATH: 0
VOMITING: 0
PALPITATIONS: 1
ARTHRALGIAS: 0

## 2023-12-14 ASSESSMENT — COLUMBIA-SUICIDE SEVERITY RATING SCALE - C-SSRS
1. SINCE LAST CONTACT, HAVE YOU WISHED YOU WERE DEAD OR WISHED YOU COULD GO TO SLEEP AND NOT WAKE UP?: YES
2. HAVE YOU ACTUALLY HAD ANY THOUGHTS OF KILLING YOURSELF?: NO
1. IN THE PAST MONTH, HAVE YOU WISHED YOU WERE DEAD OR WISHED YOU COULD GO TO SLEEP AND NOT WAKE UP?: YES
6. HAVE YOU EVER DONE ANYTHING, STARTED TO DO ANYTHING, OR PREPARED TO DO ANYTHING TO END YOUR LIFE?: NO
4. HAVE YOU HAD THESE THOUGHTS AND HAD SOME INTENTION OF ACTING ON THEM?: NO
2. HAVE YOU ACTUALLY HAD ANY THOUGHTS OF KILLING YOURSELF?: YES
5. HAVE YOU STARTED TO WORK OUT OR WORKED OUT THE DETAILS OF HOW TO KILL YOURSELF? DO YOU INTEND TO CARRY OUT THIS PLAN?: NO
6. HAVE YOU EVER DONE ANYTHING, STARTED TO DO ANYTHING, OR PREPARED TO DO ANYTHING TO END YOUR LIFE?: NO

## 2023-12-14 NOTE — ED TRIAGE NOTES
"Patient BIBA from home c/o back pain. Patient has chronic back pain and has appointment scheduled with pain management but the pain was \"too bad to wait\". Pain is chronic and atraumatic.  "

## 2023-12-15 VITALS
RESPIRATION RATE: 20 BRPM | HEART RATE: 93 BPM | DIASTOLIC BLOOD PRESSURE: 85 MMHG | SYSTOLIC BLOOD PRESSURE: 144 MMHG | OXYGEN SATURATION: 96 %

## 2023-12-15 PROCEDURE — 2500000004 HC RX 250 GENERAL PHARMACY W/ HCPCS (ALT 636 FOR OP/ED): Performed by: EMERGENCY MEDICINE

## 2023-12-15 RX ADMIN — HYDROMORPHONE HYDROCHLORIDE 1 MG: 1 INJECTION, SOLUTION INTRAMUSCULAR; INTRAVENOUS; SUBCUTANEOUS at 01:30

## 2023-12-15 RX ADMIN — DIPHENHYDRAMINE HYDROCHLORIDE 25 MG: 50 INJECTION, SOLUTION INTRAMUSCULAR; INTRAVENOUS at 01:25

## 2023-12-15 RX ADMIN — ONDANSETRON 4 MG: 2 INJECTION INTRAMUSCULAR; INTRAVENOUS at 01:30

## 2023-12-15 ASSESSMENT — PAIN SCALES - GENERAL: PAINLEVEL_OUTOF10: 8

## 2023-12-15 NOTE — ED PROVIDER NOTES
HPI   Chief Complaint   Patient presents with    Back Pain         78-year-old female, denies significant medical issues other than ongoing back pain.  Surgery 3 years ago [facility] on her back, states pain worsening ever since particularly worse over the last 1 week or so despite tramadol at home.  She has an appointment with [facility] physicians tomorrow but had intractable/severe back pain tonight presents to [facility] ED.  No direct injury blow fall trauma.  No fevers chills nausea vomiting diarrhea constipation.  No radicular symptomatology that is new or different for her.  She has been trying the tramadol, developed slight rash from it.    On arrival, she is noted to be tachycardic to 193, narrow complex on the monitor.  She does not recall or admit to prior history of SVT but looking in past medical records there is at least a diagnosis of SVT noted at 1 point.                          Jocy Coma Scale Score: 15                  Patient History   Past Medical History:   Diagnosis Date    Personal history of other diseases of the respiratory system 09/14/2020    History of acute bronchitis    Unspecified fall, initial encounter 12/02/2021    Accidental fall, initial encounter     Past Surgical History:   Procedure Laterality Date    OTHER SURGICAL HISTORY  01/08/2020    Spinal surgery    OTHER SURGICAL HISTORY  01/08/2020    Hysterectomy    OTHER SURGICAL HISTORY  01/08/2020    Femur fracture repair     Family History   Problem Relation Name Age of Onset    Other (Cardiac Disorder) Mother          CABG; Defribillator in place    Coronary artery disease Mother      Other (Cardiac Disorder) Father          CABG; Defribillator in place    Coronary artery disease Father      Heart attack Father  54    Ovarian cancer Maternal Grandmother      Alzheimer's disease Other Aunt      Social History     Tobacco Use    Smoking status: Never    Smokeless tobacco: Never   Substance Use Topics    Alcohol use: Not  Currently    Drug use: Never       Review of Systems   Constitutional:  Negative for chills and fever.   HENT:  Negative for ear pain and sore throat.    Eyes:  Negative for pain and visual disturbance.   Respiratory:  Negative for cough and shortness of breath.    Cardiovascular:  Positive for palpitations. Negative for chest pain.   Gastrointestinal:  Negative for abdominal pain and vomiting.   Genitourinary:  Negative for dysuria and hematuria.   Musculoskeletal:  Positive for back pain. Negative for arthralgias.   Skin:  Negative for color change and rash.   Neurological:  Negative for seizures and syncope.   All other systems reviewed and are negative.       Physical Exam   ED Triage Vitals   Temp Pulse Resp BP   -- -- -- --      SpO2 Temp src Heart Rate Source Patient Position   -- -- -- --      BP Location FiO2 (%)     -- --       Physical Exam  Vitals reviewed.   Constitutional:       General: She is in acute distress.      Appearance: She is well-developed.   HENT:      Head: Normocephalic and atraumatic.      Right Ear: External ear normal.      Left Ear: External ear normal.      Nose: Nose normal.      Mouth/Throat:      Mouth: Mucous membranes are moist.      Pharynx: Oropharynx is clear.   Eyes:      Conjunctiva/sclera: Conjunctivae normal.      Pupils: Pupils are equal, round, and reactive to light.   Neck:      Vascular: No JVD.   Cardiovascular:      Rate and Rhythm: Regular rhythm. Tachycardia present.      Pulses: Normal pulses.   Pulmonary:      Effort: Pulmonary effort is normal. No accessory muscle usage or respiratory distress.      Breath sounds: Normal breath sounds.   Abdominal:      General: Abdomen is flat. There is no distension.      Palpations: Abdomen is soft. There is no mass.      Tenderness: There is no abdominal tenderness.   Musculoskeletal:         General: Normal range of motion.      Cervical back: Normal range of motion and neck supple.      Comments: Back incisions clean dry  intact, old.  No evidence of infection.   Lymphadenopathy:      Cervical: No cervical adenopathy.   Skin:     General: Skin is warm and dry.      Capillary Refill: Capillary refill takes less than 2 seconds.      Comments: No zoster rash seen   Neurological:      General: No focal deficit present.      Mental Status: She is alert. Mental status is at baseline.   Psychiatric:         Mood and Affect: Mood normal.                 ECG if performed, ordered and interpreted by ED physician:        Labs Reviewed   CBC WITH AUTO DIFFERENTIAL - Abnormal       Result Value    WBC 10.6      nRBC 0.0      RBC 4.84      Hemoglobin 14.2      Hematocrit 43.9      MCV 91      MCH 29.3      MCHC 32.3      RDW 15.0 (*)     Platelets 226      Neutrophils % 50.7      Immature Granulocytes %, Automated 0.5      Lymphocytes % 39.8      Monocytes % 7.5      Eosinophils % 1.1      Basophils % 0.4      Neutrophils Absolute 5.35      Immature Granulocytes Absolute, Automated 0.05      Lymphocytes Absolute 4.20 (*)     Monocytes Absolute 0.79      Eosinophils Absolute 0.12      Basophils Absolute 0.04     COMPREHENSIVE METABOLIC PANEL - Abnormal    Glucose 69 (*)     Sodium 138      Potassium 3.8      Chloride 102      Bicarbonate 13 (*)     Anion Gap 27 (*)     Urea Nitrogen 11      Creatinine 0.56      eGFR >90      Calcium 9.1      Albumin 4.2      Alkaline Phosphatase 93      Total Protein 7.1      AST 25      Bilirubin, Total 0.5      ALT 13     SERIAL TROPONIN-INITIAL - Abnormal    Troponin I, High Sensitivity 16 (*)     Narrative:     Less than 99th percentile of normal range cutoff-  Female and children under 18 years old <14 ng/L; Male <21 ng/L: Negative  Repeat testing should be performed if clinically indicated.     Female and children under 18 years old 14-50 ng/L; Male 21-50 ng/L:  Consistent with possible cardiac damage and possible increased clinical   risk. Serial measurements may help to assess extent of myocardial damage.      >50 ng/L: Consistent with cardiac damage, increased clinical risk and  myocardial infarction. Serial measurements may help assess extent of   myocardial damage.      NOTE: Children less than 1 year old may have higher baseline troponin   levels and results should be interpreted in conjunction with the overall   clinical context.     NOTE: Troponin I testing is performed using a different   testing methodology at [facility] than at other   [facility]. Direct result comparisons should only   be made within the same method.   SERIAL TROPONIN, 1 HOUR - Abnormal    Troponin I, High Sensitivity 26 (*)     Narrative:     Less than 99th percentile of normal range cutoff-  Female and children under 18 years old <14 ng/L; Male <21 ng/L: Negative  Repeat testing should be performed if clinically indicated.     Female and children under 18 years old 14-50 ng/L; Male 21-50 ng/L:  Consistent with possible cardiac damage and possible increased clinical   risk. Serial measurements may help to assess extent of myocardial damage.     >50 ng/L: Consistent with cardiac damage, increased clinical risk and  myocardial infarction. Serial measurements may help assess extent of   myocardial damage.      NOTE: Children less than 1 year old may have higher baseline troponin   levels and results should be interpreted in conjunction with the overall   clinical context.     NOTE: Troponin I testing is performed using a different   testing methodology at [facility] than at other   [facility]. Direct result comparisons should only   be made within the same method.   TROPONIN SERIES- (INITIAL, 1 HR)    Narrative:     The following orders were created for panel order Troponin I Series, High Sensitivity (0, 1 HR).  Procedure                               Abnormality         Status                     ---------                               -----------         ------                     Troponin I, High  Sensiti...[[id number]]  Abnormal            Final result               Troponin, High Sensitivi...[[id number]]  Abnormal            Final result                 Please view results for these tests on the individual orders.          CT cervical spine wo IV contrast   Final Result   As seen on prior CT similar appearance of the cervical, thoracic and   lumbar spine to prior MRI with extensive postsurgical changes.   Demineralization. Scattered degenerative changes. No significant   change when compared to prior.. If concern for new pathology, further   evaluation is advised with follow-up MRI        MACRO:   None        Signed by: Vishal Wong 12/14/2023 11:00 PM   Dictation workstation:   [id number]      CT thoracic spine wo IV contrast   Final Result   As seen on prior CT similar appearance of the cervical, thoracic and   lumbar spine to prior MRI with extensive postsurgical changes.   Demineralization. Scattered degenerative changes. No significant   change when compared to prior.. If concern for new pathology, further   evaluation is advised with follow-up MRI        MACRO:   None        Signed by: Vishal Wong 12/14/2023 11:00 PM   Dictation workstation:   [id number]      CT lumbar spine wo IV contrast   Final Result   As seen on prior CT similar appearance of the cervical, thoracic and   lumbar spine to prior MRI with extensive postsurgical changes.   Demineralization. Scattered degenerative changes. No significant   change when compared to prior.. If concern for new pathology, further   evaluation is advised with follow-up MRI        MACRO:   None        Signed by: Vishal Wong 12/14/2023 11:00 PM   Dictation workstation:   [id number]      XR chest 1 view   Final Result   No acute cardiopulmonary disease.   Signed by Duke Rubio MD               ED Course & MDM     ED Medication Administration from 12/14/2023 1842 to 12/14/2023 2330         Date/Time Order Dose Route Action Action by      12/14/2023 1940 EST adenosine (Adenocard) injection 12 mg -- intravenous Given Sweta, D     12/14/2023 2005 EST HYDROmorphone (Dilaudid) injection 1 mg 1 mg intravenous Given Sweta, D     12/14/2023 2005 EST ondansetron (Zofran) injection 4 mg 4 mg intravenous Given Sweta, D     12/14/2023 2025 EST diphenhydrAMINE (BENADryl) injection 50 mg 50 mg intravenous Given Sweta, D                   ED Course as of 12/14/23 2330   Thu Dec 14, 2023   1946 ECG 12 Lead  EKG ordered interpreted by ED physician: First ECG at 1923 with relatively narrow complex tachycardia in my opinion, 193 bpm.  Nonspecific ST depression likely rate related. [KS]   1946 ECG 12 Lead  Repeat ECG ordered and interpreted by ED physician after 12 mg of adenosine demonstrates sinus tachycardia, 118 bpm.  Nonspecific T wave flattening.  Improved from prior segments.  No definitive acute ischemic findings. [KS]      ED Course User Index  [KS] Akbar Chaves MD MPH         Diagnoses as of 12/14/23 2330   Other back pain, unspecified chronicity   Pruritus   SVT (supraventricular tachycardia)       Medical Decision Making  Patient with back pain, but more importantly at this point she appears to be in narrow complex tachycardia anywhere from 190 to 210 bpm.  Chemical cardioversion undertaken.  Adenosine 12 mg given.  Converted to sinus rhythm.    Procedure note: Chemical cardioversion with 12 mg of adenosine due to narrow complex tachycardia.  Successful conversion to sinus rhythm.  Patient consented verbally.  No complications apparent.  Defibrillator pads placed during procedure as a precaution.  IV fluids running.    Critical care time, separate than billable procedures.  Approximately 35 minutes.  Chemical cardioversion management of SVT.      Analgesia for back pain.  Imaging.  Reassess pain and ensure stability of cardiovascular system.    Troponin minimally elevated likely just due to the extreme tachycardia.  She is having no chest pain or  other symptomatology with it.  Her back pain is controlled with analgesia.  Itchiness still present, but recommend Benadryl.  She does not want to be hospitalized.  She wants to keep her appointment with [facility] tomorrow.  She feels stable otherwise.  Her heart rate has been sinus rhythm, 80 to 90 bpm at this point time.  Do not believe continued troponin checks are warranted.  Likely just strain or stress related due to the fast heart rate.  Reasonable for discharge at this time.  Continue home analgesics and Benadryl for itching with her orthopedic follow-up tomorrow.          Procedure  Procedures           Akbar Chaves MD MPH  12/14/23 5148

## 2023-12-16 ENCOUNTER — HOSPITAL ENCOUNTER (EMERGENCY)
Facility: HOSPITAL | Age: 78
Discharge: HOME | End: 2023-12-16
Attending: EMERGENCY MEDICINE
Payer: COMMERCIAL

## 2023-12-16 ENCOUNTER — APPOINTMENT (OUTPATIENT)
Dept: RADIOLOGY | Facility: HOSPITAL | Age: 78
End: 2023-12-16
Payer: COMMERCIAL

## 2023-12-16 VITALS
OXYGEN SATURATION: 96 % | BODY MASS INDEX: 23.32 KG/M2 | SYSTOLIC BLOOD PRESSURE: 128 MMHG | HEIGHT: 65 IN | RESPIRATION RATE: 23 BRPM | DIASTOLIC BLOOD PRESSURE: 83 MMHG | WEIGHT: 139.99 LBS | HEART RATE: 85 BPM

## 2023-12-16 DIAGNOSIS — G89.29 CHRONIC BACK PAIN, UNSPECIFIED BACK LOCATION, UNSPECIFIED BACK PAIN LATERALITY: Primary | ICD-10-CM

## 2023-12-16 DIAGNOSIS — M54.9 CHRONIC BACK PAIN, UNSPECIFIED BACK LOCATION, UNSPECIFIED BACK PAIN LATERALITY: Primary | ICD-10-CM

## 2023-12-16 LAB
ALBUMIN SERPL BCP-MCNC: 4.1 G/DL (ref 3.4–5)
ALP SERPL-CCNC: 89 U/L (ref 33–136)
ALT SERPL W P-5'-P-CCNC: 12 U/L (ref 7–45)
ANION GAP SERPL CALC-SCNC: 15 MMOL/L (ref 10–20)
AST SERPL W P-5'-P-CCNC: 20 U/L (ref 9–39)
BASOPHILS # BLD AUTO: 0.04 X10*3/UL (ref 0–0.1)
BASOPHILS NFR BLD AUTO: 0.5 %
BILIRUB SERPL-MCNC: 0.5 MG/DL (ref 0–1.2)
BUN SERPL-MCNC: 7 MG/DL (ref 6–23)
CALCIUM SERPL-MCNC: 9.2 MG/DL (ref 8.6–10.3)
CHLORIDE SERPL-SCNC: 102 MMOL/L (ref 98–107)
CO2 SERPL-SCNC: 24 MMOL/L (ref 21–32)
CREAT SERPL-MCNC: 0.57 MG/DL (ref 0.5–1.05)
EOSINOPHIL # BLD AUTO: 0.11 X10*3/UL (ref 0–0.4)
EOSINOPHIL NFR BLD AUTO: 1.4 %
ERYTHROCYTE [DISTWIDTH] IN BLOOD BY AUTOMATED COUNT: 15 % (ref 11.5–14.5)
GFR SERPL CREATININE-BSD FRML MDRD: >90 ML/MIN/1.73M*2
GLUCOSE SERPL-MCNC: 95 MG/DL (ref 74–99)
HCT VFR BLD AUTO: 39.1 % (ref 36–46)
HGB BLD-MCNC: 13.1 G/DL (ref 12–16)
IMM GRANULOCYTES # BLD AUTO: 0.02 X10*3/UL (ref 0–0.5)
IMM GRANULOCYTES NFR BLD AUTO: 0.3 % (ref 0–0.9)
LACTATE SERPL-SCNC: 0.7 MMOL/L (ref 0.4–2)
LYMPHOCYTES # BLD AUTO: 2.85 X10*3/UL (ref 0.8–3)
LYMPHOCYTES NFR BLD AUTO: 37.2 %
MAGNESIUM SERPL-MCNC: 1.8 MG/DL (ref 1.6–2.4)
MCH RBC QN AUTO: 29.9 PG (ref 26–34)
MCHC RBC AUTO-ENTMCNC: 33.5 G/DL (ref 32–36)
MCV RBC AUTO: 89 FL (ref 80–100)
MONOCYTES # BLD AUTO: 0.72 X10*3/UL (ref 0.05–0.8)
MONOCYTES NFR BLD AUTO: 9.4 %
NEUTROPHILS # BLD AUTO: 3.93 X10*3/UL (ref 1.6–5.5)
NEUTROPHILS NFR BLD AUTO: 51.2 %
NRBC BLD-RTO: 0 /100 WBCS (ref 0–0)
PLATELET # BLD AUTO: 250 X10*3/UL (ref 150–450)
POTASSIUM SERPL-SCNC: 3.3 MMOL/L (ref 3.5–5.3)
PROT SERPL-MCNC: 6.6 G/DL (ref 6.4–8.2)
RBC # BLD AUTO: 4.38 X10*6/UL (ref 4–5.2)
SODIUM SERPL-SCNC: 138 MMOL/L (ref 136–145)
WBC # BLD AUTO: 7.7 X10*3/UL (ref 4.4–11.3)

## 2023-12-16 PROCEDURE — 83605 ASSAY OF LACTIC ACID: CPT | Performed by: PHYSICIAN ASSISTANT

## 2023-12-16 PROCEDURE — 83735 ASSAY OF MAGNESIUM: CPT | Performed by: PHYSICIAN ASSISTANT

## 2023-12-16 PROCEDURE — 71275 CT ANGIOGRAPHY CHEST: CPT

## 2023-12-16 PROCEDURE — 2550000001 HC RX 255 CONTRASTS: Performed by: PHYSICIAN ASSISTANT

## 2023-12-16 PROCEDURE — 96375 TX/PRO/DX INJ NEW DRUG ADDON: CPT | Mod: 59

## 2023-12-16 PROCEDURE — 2500000004 HC RX 250 GENERAL PHARMACY W/ HCPCS (ALT 636 FOR OP/ED): Performed by: PHYSICIAN ASSISTANT

## 2023-12-16 PROCEDURE — 74174 CTA ABD&PLVS W/CONTRAST: CPT

## 2023-12-16 PROCEDURE — 36415 COLL VENOUS BLD VENIPUNCTURE: CPT | Performed by: PHYSICIAN ASSISTANT

## 2023-12-16 PROCEDURE — 99284 EMERGENCY DEPT VISIT MOD MDM: CPT | Mod: 25 | Performed by: EMERGENCY MEDICINE

## 2023-12-16 PROCEDURE — 85025 COMPLETE CBC W/AUTO DIFF WBC: CPT | Performed by: PHYSICIAN ASSISTANT

## 2023-12-16 PROCEDURE — 2550000001 HC RX 255 CONTRASTS: Performed by: EMERGENCY MEDICINE

## 2023-12-16 PROCEDURE — 80053 COMPREHEN METABOLIC PANEL: CPT | Performed by: PHYSICIAN ASSISTANT

## 2023-12-16 PROCEDURE — 96374 THER/PROPH/DIAG INJ IV PUSH: CPT | Mod: 59

## 2023-12-16 PROCEDURE — 96361 HYDRATE IV INFUSION ADD-ON: CPT

## 2023-12-16 PROCEDURE — 2500000001 HC RX 250 WO HCPCS SELF ADMINISTERED DRUGS (ALT 637 FOR MEDICARE OP): Performed by: PHYSICIAN ASSISTANT

## 2023-12-16 RX ORDER — ONDANSETRON HYDROCHLORIDE 2 MG/ML
4 INJECTION, SOLUTION INTRAVENOUS ONCE
Status: COMPLETED | OUTPATIENT
Start: 2023-12-16 | End: 2023-12-16

## 2023-12-16 RX ORDER — OXYCODONE AND ACETAMINOPHEN 5; 325 MG/1; MG/1
1 TABLET ORAL EVERY 6 HOURS PRN
Qty: 12 TABLET | Refills: 0 | Status: ON HOLD | OUTPATIENT
Start: 2023-12-16 | End: 2023-12-20

## 2023-12-16 RX ORDER — MORPHINE SULFATE 4 MG/ML
4 INJECTION, SOLUTION INTRAMUSCULAR; INTRAVENOUS ONCE
Status: DISCONTINUED | OUTPATIENT
Start: 2023-12-16 | End: 2023-12-16

## 2023-12-16 RX ORDER — DOCUSATE SODIUM 100 MG/1
100 CAPSULE, LIQUID FILLED ORAL EVERY 12 HOURS
Qty: 60 CAPSULE | Refills: 0 | Status: ON HOLD | OUTPATIENT
Start: 2023-12-16 | End: 2023-12-29 | Stop reason: ENTERED-IN-ERROR

## 2023-12-16 RX ORDER — HYDROMORPHONE HYDROCHLORIDE 1 MG/ML
1 INJECTION, SOLUTION INTRAMUSCULAR; INTRAVENOUS; SUBCUTANEOUS ONCE
Status: COMPLETED | OUTPATIENT
Start: 2023-12-16 | End: 2023-12-16

## 2023-12-16 RX ORDER — PREDNISONE 20 MG/1
40 TABLET ORAL DAILY
Qty: 8 TABLET | Refills: 0 | Status: ON HOLD | OUTPATIENT
Start: 2023-12-16 | End: 2023-12-20 | Stop reason: SDUPTHER

## 2023-12-16 RX ORDER — OXYCODONE AND ACETAMINOPHEN 5; 325 MG/1; MG/1
1 TABLET ORAL ONCE
Status: COMPLETED | OUTPATIENT
Start: 2023-12-16 | End: 2023-12-16

## 2023-12-16 RX ORDER — DIPHENHYDRAMINE HYDROCHLORIDE 50 MG/ML
25 INJECTION INTRAMUSCULAR; INTRAVENOUS ONCE
Status: COMPLETED | OUTPATIENT
Start: 2023-12-16 | End: 2023-12-16

## 2023-12-16 RX ADMIN — OXYCODONE HYDROCHLORIDE AND ACETAMINOPHEN 1 TABLET: 5; 325 TABLET ORAL at 18:23

## 2023-12-16 RX ADMIN — IOHEXOL 90 ML: 350 INJECTION, SOLUTION INTRAVENOUS at 14:54

## 2023-12-16 RX ADMIN — HYDROMORPHONE HYDROCHLORIDE 1 MG: 1 INJECTION, SOLUTION INTRAMUSCULAR; INTRAVENOUS; SUBCUTANEOUS at 13:03

## 2023-12-16 RX ADMIN — DIPHENHYDRAMINE HYDROCHLORIDE 25 MG: 50 INJECTION INTRAMUSCULAR; INTRAVENOUS at 14:15

## 2023-12-16 RX ADMIN — SODIUM CHLORIDE 500 ML: 9 INJECTION, SOLUTION INTRAVENOUS at 13:14

## 2023-12-16 RX ADMIN — ONDANSETRON 4 MG: 2 INJECTION INTRAMUSCULAR; INTRAVENOUS at 13:03

## 2023-12-16 RX ADMIN — METHYLPREDNISOLONE SODIUM SUCCINATE 125 MG: 125 INJECTION, POWDER, FOR SOLUTION INTRAMUSCULAR; INTRAVENOUS at 15:44

## 2023-12-16 ASSESSMENT — COLUMBIA-SUICIDE SEVERITY RATING SCALE - C-SSRS
2. HAVE YOU ACTUALLY HAD ANY THOUGHTS OF KILLING YOURSELF?: NO
5. HAVE YOU STARTED TO WORK OUT OR WORKED OUT THE DETAILS OF HOW TO KILL YOURSELF? DO YOU INTEND TO CARRY OUT THIS PLAN?: NO
4. HAVE YOU HAD THESE THOUGHTS AND HAD SOME INTENTION OF ACTING ON THEM?: NO
1. IN THE PAST MONTH, HAVE YOU WISHED YOU WERE DEAD OR WISHED YOU COULD GO TO SLEEP AND NOT WAKE UP?: NO
6. HAVE YOU EVER DONE ANYTHING, STARTED TO DO ANYTHING, OR PREPARED TO DO ANYTHING TO END YOUR LIFE?: NO

## 2023-12-16 ASSESSMENT — PAIN SCALES - GENERAL: PAINLEVEL_OUTOF10: 10 - WORST POSSIBLE PAIN

## 2023-12-16 ASSESSMENT — PAIN DESCRIPTION - PAIN TYPE: TYPE: ACUTE PAIN;CHRONIC PAIN

## 2023-12-16 ASSESSMENT — PAIN - FUNCTIONAL ASSESSMENT: PAIN_FUNCTIONAL_ASSESSMENT: 0-10

## 2023-12-16 NOTE — ED PROVIDER NOTES
HPI   Chief Complaint   Patient presents with    Back Pain     Pt states that she has 10/10 lower back pain that radiates down to her toes. Pt states that she was here 2 days ago.        Is a 78-year-old female coming for back pain.  Patient had recently been here for the same back pain.  She denies any trauma or injury.  She followed up with orthopedics yesterday who told her that it is likely due to loose hardware pressing on a nerve.  She was on tramadol.  They told her to take ibuprofen and discharged her.  Patient is coming back in because ibuprofen and tramadol are not helping her pain.  She states she feels down into the lower abdomen as well as into the back.  She denies any incontinence.  No saddle anesthesia.  No weakness of the legs.  Patient has seen Dr. Cardenas at [facility] for this back pain and who did the surgery on her.      History provided by:  Patient and EMS personnel                      No data recorded                Patient History   Past Medical History:   Diagnosis Date    Personal history of other diseases of the respiratory system 09/14/2020    History of acute bronchitis    Unspecified fall, initial encounter 12/02/2021    Accidental fall, initial encounter     Past Surgical History:   Procedure Laterality Date    OTHER SURGICAL HISTORY  01/08/2020    Spinal surgery    OTHER SURGICAL HISTORY  01/08/2020    Hysterectomy    OTHER SURGICAL HISTORY  01/08/2020    Femur fracture repair     Family History   Problem Relation Name Age of Onset    Other (Cardiac Disorder) Mother          CABG; Defribillator in place    Coronary artery disease Mother      Other (Cardiac Disorder) Father          CABG; Defribillator in place    Coronary artery disease Father      Heart attack Father  54    Ovarian cancer Maternal Grandmother      Alzheimer's disease Other Aunt      Social History     Tobacco Use    Smoking status: Never    Smokeless tobacco: Never   Substance Use Topics    Alcohol use: Not  Currently    Drug use: Never       Physical Exam   ED Triage Vitals [12/16/23 1209]   Temp Heart Rate Resp BP   -- 92 18 139/77      SpO2 Temp src Heart Rate Source Patient Position   100 % -- Monitor Lying      BP Location FiO2 (%)     Left arm --       Physical Exam  Vitals and nursing note reviewed.   Constitutional:       General: She is not in acute distress.     Appearance: Normal appearance. She is not toxic-appearing.   HENT:      Head: Normocephalic and atraumatic.      Nose: Nose normal.      Mouth/Throat:      Mouth: Mucous membranes are moist.      Pharynx: Oropharynx is clear.   Eyes:      Extraocular Movements: Extraocular movements intact.      Conjunctiva/sclera: Conjunctivae normal.      Pupils: Pupils are equal, round, and reactive to light.   Cardiovascular:      Rate and Rhythm: Regular rhythm. Tachycardia present.      Pulses: Normal pulses.      Heart sounds: Normal heart sounds.   Pulmonary:      Effort: Pulmonary effort is normal. No respiratory distress.      Breath sounds: Normal breath sounds.   Abdominal:      General: Abdomen is flat. Bowel sounds are normal.      Palpations: Abdomen is soft.      Tenderness: There is no abdominal tenderness.   Musculoskeletal:         General: Normal range of motion.      Cervical back: Normal range of motion and neck supple.      Lumbar back: Tenderness present.      Comments: There is pain on palpation across the entire lumbar spine.   Skin:     General: Skin is warm and dry.      Coloration: Skin is not jaundiced or pale.      Findings: No bruising.   Neurological:      General: No focal deficit present.      Mental Status: She is alert and oriented to person, place, and time. Mental status is at baseline.      GCS: GCS eye subscore is 4. GCS verbal subscore is 5. GCS motor subscore is 6.      Cranial Nerves: Cranial nerves 2-12 are intact.      Sensory: Sensation is intact.      Motor: Motor function is intact.      Coordination: Coordination is  intact.      Gait: Gait is intact.   Psychiatric:         Mood and Affect: Mood normal.         Behavior: Behavior normal.         ED Course & MDM   Diagnoses as of 12/16/23 1624   Chronic back pain, unspecified back location, unspecified back pain laterality       Medical Decision Making  Summary:  Medical Decision Making:   Patient presented as described in HPI. Patient case including ROS, PE, and treatment and plan discussed with ED attending if attached as cosigner. Results from labs and or imaging included below if completed. Roby Qureshi  is a 78 y.o. coming in for Patient presents with:  Back Pain: Pt states that she has 10/10 lower back pain that radiates down to her toes. Pt states that she was here 2 days ago.   .  Patient is a 78-year-old female.  This is a return visit for back pain.  Patient had imaging completed 2 days ago but was also found to be in SVT.  EKG was completed and patient is not in SVT.  She does complain of her continued low back pain and states that radiates around to her abdomen as well as her rectum.  On physical exam she does not have any neurological dysfunction of concern.  She is ambulatory.  Patient denies any direct injury to the back.  She saw orthopedics yesterday who advised her that there is not much that they can do for and she need to follow-up with pain management and her primary providers.  Due to the patient's continued pain advanced imaging will be completed once again for the angio and intra-abdominal pathology.  Imaging is negative.  She did require pain medication of Dilaudid and also requested Benadryl.  This was ordered.  Patient was given Solu-Medrol for further pain control and she stated pruritus that occurred with pain medication.  Patient does have pain under control.  Spoke with patient's pain management as well as PCP and advised him of plan for pain control and follow-up.  Patient will be discharged with Percocet as well as prednisone.  Patient's  "nurse came up questioning about patient's discharge.  Patient was stating to her that she does not feel safe going home.  She was stating that her back pain is too severe.  I went talk to the patient.  The patient made suicidal statements to the nurse however she is denying these.  Patient discussed treatment plan.  Multiple times during my discussion with her she states \"where is my Percocet\" and is stating that she needs something for pain.  This is a chronic pain.  There are multiple discussions with patient and she does not feel suicidal.  She is able to get around her house.  She does not want staff to leave the room and is very anxious.  Patient does not appear in any acute distress.  After further discussion she is agreeable for discharge and follow-up as scheduled with pain management.    Patient was advised to follow up with PCP or recommended provider in 2-3 days for another evaluation and exam. I advised patient/guardian to return or go to closest emergency room immediately if symptoms change, get worse, new symptoms develop prior to follow up. If there is no improvement in symptoms in the next 24 hours they are advised to return for further evaluation and exam. I also explained the plan and treatment course. Patient/guardian is in agreement with plan, treatment course, and follow up and states verbally that they will comply.    Labs Reviewed  CBC WITH AUTO DIFFERENTIAL - Abnormal     WBC                           7.7                    nRBC                          0.0                    RBC                           4.38                   Hemoglobin                    13.1                   Hematocrit                    39.1                   MCV                           89                     MCH                           29.9                   MCHC                          33.5                   RDW                           15.0 (*)               Platelets                     250                    " Neutrophils %                 51.2                   Immature Granulocytes %, Automated   0.3                    Lymphocytes %                 37.2                   Monocytes %                   9.4                    Eosinophils %                 1.4                    Basophils %                   0.5                    Neutrophils Absolute          3.93                   Immature Granulocytes Absolute, Au*   0.02                   Lymphocytes Absolute          2.85                   Monocytes Absolute            0.72                   Eosinophils Absolute          0.11                   Basophils Absolute            0.04                COMPREHENSIVE METABOLIC PANEL - Abnormal     Glucose                       95                     Sodium                        138                    Potassium                     3.3 (*)                Chloride                      102                    Bicarbonate                   24                     Anion Gap                     15                     Urea Nitrogen                 7                      Creatinine                    0.57                   eGFR                          >90                    Calcium                       9.2                    Albumin                       4.1                    Alkaline Phosphatase          89                     Total Protein                 6.6                    AST                           20                     Bilirubin, Total              0.5                    ALT                           12                  MAGNESIUM - Normal     Magnesium                     1.80                LACTATE - Normal     Lactate                       0.7                      Narrative: Venipuncture immediately after or during the administration of Metamizole may lead to falsely low results. Testing should be performed immediately                prior to Metamizole dosing.  URINALYSIS WITH REFLEX MICROSCOPIC AND CULTURE   CT angio  chest abdomen pelvis   Final Result    There is no dissection or aneurysm of the aorta          Unchanged hemangioma right hepatic lobe          1.4 cm mass of the left kidney is unchanged and has density which is    incompatible with a simple cyst. Further evaluation with MRI is    recommended to determine if this is a complicated cyst or a tumor.                MACRO:    None.          Signed by: Carly Andrade 12/16/2023 3:40 PM    Dictation workstation:   [id number]          Tests/Medications/Escalations of Care considered but not given: As in MDM    Patient care discussed with: N/A  Social Determinants affecting care: N/A    Final diagnosis and disposition as documented       Homegoing. I discussed the differential; results and discharge plan with the patient and/or family/friend/caregiver if present.  I emphasized the importance of follow-up with the physician I referred them to in the timeframe recommended.  I explained reasons for the patient to return to the Emergency Department. They agreed that if they feel their condition is worsening or if they have any other concern they should call 911 immediately for further assistance. I gave the patient an opportunity to ask all questions they had and answered all of them accordingly. They understand return precautions and discharge instructions. The patient and/or family/friend/caregiver expressed understanding verbally and that they would comply.     Disposition: Discharge      This note has been transcribed using voice recognition and may contain grammatical errors, misplaced words, incorrect words, incorrect phrases or other errors.         Procedure  Procedures     Shaan Spencer PA-C  12/16/23 [id number]       Shaan Spencer PA-C  12/16/23 9365

## 2023-12-17 ENCOUNTER — HOSPITAL ENCOUNTER (OUTPATIENT)
Facility: HOSPITAL | Age: 78
Setting detail: OBSERVATION
LOS: 1 days | Discharge: HOME | End: 2023-12-20
Attending: EMERGENCY MEDICINE | Admitting: INTERNAL MEDICINE
Payer: COMMERCIAL

## 2023-12-17 DIAGNOSIS — G47.00 INSOMNIA, UNSPECIFIED TYPE: ICD-10-CM

## 2023-12-17 DIAGNOSIS — G89.29 CHRONIC RIGHT-SIDED LOW BACK PAIN WITH RIGHT-SIDED SCIATICA: Primary | ICD-10-CM

## 2023-12-17 DIAGNOSIS — G89.29 CHRONIC BACK PAIN, UNSPECIFIED BACK LOCATION, UNSPECIFIED BACK PAIN LATERALITY: ICD-10-CM

## 2023-12-17 DIAGNOSIS — M54.41 CHRONIC RIGHT-SIDED LOW BACK PAIN WITH RIGHT-SIDED SCIATICA: Primary | ICD-10-CM

## 2023-12-17 DIAGNOSIS — K59.00 CONSTIPATION, UNSPECIFIED CONSTIPATION TYPE: ICD-10-CM

## 2023-12-17 DIAGNOSIS — M54.9 CHRONIC BACK PAIN, UNSPECIFIED BACK LOCATION, UNSPECIFIED BACK PAIN LATERALITY: ICD-10-CM

## 2023-12-17 LAB
ANION GAP SERPL CALC-SCNC: 14 MMOL/L (ref 10–20)
BASOPHILS # BLD AUTO: 0.05 X10*3/UL (ref 0–0.1)
BASOPHILS NFR BLD AUTO: 0.3 %
BUN SERPL-MCNC: 9 MG/DL (ref 6–23)
CALCIUM SERPL-MCNC: 9.6 MG/DL (ref 8.6–10.3)
CHLORIDE SERPL-SCNC: 100 MMOL/L (ref 98–107)
CO2 SERPL-SCNC: 30 MMOL/L (ref 21–32)
CREAT SERPL-MCNC: 0.62 MG/DL (ref 0.5–1.05)
EOSINOPHIL # BLD AUTO: 0.06 X10*3/UL (ref 0–0.4)
EOSINOPHIL NFR BLD AUTO: 0.4 %
ERYTHROCYTE [DISTWIDTH] IN BLOOD BY AUTOMATED COUNT: 14.8 % (ref 11.5–14.5)
GFR SERPL CREATININE-BSD FRML MDRD: >90 ML/MIN/1.73M*2
GLUCOSE SERPL-MCNC: 90 MG/DL (ref 74–99)
HCT VFR BLD AUTO: 40.5 % (ref 36–46)
HGB BLD-MCNC: 13.5 G/DL (ref 12–16)
IMM GRANULOCYTES # BLD AUTO: 0.05 X10*3/UL (ref 0–0.5)
IMM GRANULOCYTES NFR BLD AUTO: 0.3 % (ref 0–0.9)
LYMPHOCYTES # BLD AUTO: 4.86 X10*3/UL (ref 0.8–3)
LYMPHOCYTES NFR BLD AUTO: 34 %
MCH RBC QN AUTO: 29.3 PG (ref 26–34)
MCHC RBC AUTO-ENTMCNC: 33.3 G/DL (ref 32–36)
MCV RBC AUTO: 88 FL (ref 80–100)
MONOCYTES # BLD AUTO: 1.03 X10*3/UL (ref 0.05–0.8)
MONOCYTES NFR BLD AUTO: 7.2 %
NEUTROPHILS # BLD AUTO: 8.26 X10*3/UL (ref 1.6–5.5)
NEUTROPHILS NFR BLD AUTO: 57.8 %
NRBC BLD-RTO: 0 /100 WBCS (ref 0–0)
PLATELET # BLD AUTO: 306 X10*3/UL (ref 150–450)
POTASSIUM SERPL-SCNC: 3.3 MMOL/L (ref 3.5–5.3)
RBC # BLD AUTO: 4.61 X10*6/UL (ref 4–5.2)
SODIUM SERPL-SCNC: 141 MMOL/L (ref 136–145)
WBC # BLD AUTO: 14.3 X10*3/UL (ref 4.4–11.3)

## 2023-12-17 PROCEDURE — 2500000004 HC RX 250 GENERAL PHARMACY W/ HCPCS (ALT 636 FOR OP/ED): Performed by: INTERNAL MEDICINE

## 2023-12-17 PROCEDURE — 76937 US GUIDE VASCULAR ACCESS: CPT

## 2023-12-17 PROCEDURE — 99285 EMERGENCY DEPT VISIT HI MDM: CPT | Performed by: EMERGENCY MEDICINE

## 2023-12-17 PROCEDURE — 1100000001 HC PRIVATE ROOM DAILY

## 2023-12-17 PROCEDURE — 36415 COLL VENOUS BLD VENIPUNCTURE: CPT | Performed by: EMERGENCY MEDICINE

## 2023-12-17 PROCEDURE — 99222 1ST HOSP IP/OBS MODERATE 55: CPT | Performed by: INTERNAL MEDICINE

## 2023-12-17 PROCEDURE — 80048 BASIC METABOLIC PNL TOTAL CA: CPT | Performed by: EMERGENCY MEDICINE

## 2023-12-17 PROCEDURE — 96372 THER/PROPH/DIAG INJ SC/IM: CPT | Performed by: INTERNAL MEDICINE

## 2023-12-17 PROCEDURE — 2500000001 HC RX 250 WO HCPCS SELF ADMINISTERED DRUGS (ALT 637 FOR MEDICARE OP): Performed by: INTERNAL MEDICINE

## 2023-12-17 PROCEDURE — 85025 COMPLETE CBC W/AUTO DIFF WBC: CPT | Performed by: EMERGENCY MEDICINE

## 2023-12-17 PROCEDURE — 2500000004 HC RX 250 GENERAL PHARMACY W/ HCPCS (ALT 636 FOR OP/ED): Performed by: EMERGENCY MEDICINE

## 2023-12-17 PROCEDURE — 2500000001 HC RX 250 WO HCPCS SELF ADMINISTERED DRUGS (ALT 637 FOR MEDICARE OP): Performed by: EMERGENCY MEDICINE

## 2023-12-17 PROCEDURE — 2500000002 HC RX 250 W HCPCS SELF ADMINISTERED DRUGS (ALT 637 FOR MEDICARE OP, ALT 636 FOR OP/ED): Mod: MUE | Performed by: INTERNAL MEDICINE

## 2023-12-17 RX ORDER — DULOXETIN HYDROCHLORIDE 30 MG/1
60 CAPSULE, DELAYED RELEASE ORAL EVERY MORNING
Status: DISCONTINUED | OUTPATIENT
Start: 2023-12-18 | End: 2023-12-20 | Stop reason: HOSPADM

## 2023-12-17 RX ORDER — PREDNISONE 20 MG/1
40 TABLET ORAL DAILY
Status: DISCONTINUED | OUTPATIENT
Start: 2023-12-18 | End: 2023-12-20 | Stop reason: HOSPADM

## 2023-12-17 RX ORDER — HYDROXYZINE PAMOATE 25 MG/1
25 CAPSULE ORAL DAILY PRN
Status: DISCONTINUED | OUTPATIENT
Start: 2023-12-17 | End: 2023-12-20 | Stop reason: HOSPADM

## 2023-12-17 RX ORDER — DOCUSATE SODIUM 100 MG/1
100 CAPSULE, LIQUID FILLED ORAL EVERY 12 HOURS
Status: DISCONTINUED | OUTPATIENT
Start: 2023-12-17 | End: 2023-12-17

## 2023-12-17 RX ORDER — CYCLOBENZAPRINE HCL 10 MG
10 TABLET ORAL EVERY 8 HOURS
Status: DISCONTINUED | OUTPATIENT
Start: 2023-12-17 | End: 2023-12-20 | Stop reason: HOSPADM

## 2023-12-17 RX ORDER — DIPHENHYDRAMINE HCL 25 MG
25 CAPSULE ORAL EVERY 6 HOURS
Status: DISCONTINUED | OUTPATIENT
Start: 2023-12-17 | End: 2023-12-20 | Stop reason: HOSPADM

## 2023-12-17 RX ORDER — DIPHENHYDRAMINE HCL 25 MG
25 CAPSULE ORAL ONCE
Status: COMPLETED | OUTPATIENT
Start: 2023-12-17 | End: 2023-12-17

## 2023-12-17 RX ORDER — VENLAFAXINE HYDROCHLORIDE 75 MG/1
300 CAPSULE, EXTENDED RELEASE ORAL DAILY
Status: DISCONTINUED | OUTPATIENT
Start: 2023-12-18 | End: 2023-12-20 | Stop reason: HOSPADM

## 2023-12-17 RX ORDER — OXYCODONE AND ACETAMINOPHEN 5; 325 MG/1; MG/1
1 TABLET ORAL EVERY 6 HOURS PRN
Status: DISCONTINUED | OUTPATIENT
Start: 2023-12-17 | End: 2023-12-20 | Stop reason: HOSPADM

## 2023-12-17 RX ORDER — GABAPENTIN 400 MG/1
800 CAPSULE ORAL 3 TIMES DAILY
Status: DISCONTINUED | OUTPATIENT
Start: 2023-12-17 | End: 2023-12-20 | Stop reason: HOSPADM

## 2023-12-17 RX ORDER — OXYCODONE AND ACETAMINOPHEN 5; 325 MG/1; MG/1
1 TABLET ORAL ONCE
Status: COMPLETED | OUTPATIENT
Start: 2023-12-17 | End: 2023-12-17

## 2023-12-17 RX ORDER — DOXYLAMINE SUCCINATE 25 MG
TABLET ORAL 2 TIMES DAILY
Status: DISCONTINUED | OUTPATIENT
Start: 2023-12-17 | End: 2023-12-18

## 2023-12-17 RX ORDER — HYDROXYZINE HYDROCHLORIDE 25 MG/1
100 TABLET, FILM COATED ORAL NIGHTLY PRN
Status: DISCONTINUED | OUTPATIENT
Start: 2023-12-18 | End: 2023-12-17

## 2023-12-17 RX ORDER — ZOLPIDEM TARTRATE 5 MG/1
10 TABLET ORAL NIGHTLY
Status: DISCONTINUED | OUTPATIENT
Start: 2023-12-17 | End: 2023-12-20 | Stop reason: HOSPADM

## 2023-12-17 RX ORDER — DOCUSATE SODIUM 100 MG/1
100 CAPSULE, LIQUID FILLED ORAL 2 TIMES DAILY
Status: DISCONTINUED | OUTPATIENT
Start: 2023-12-17 | End: 2023-12-20 | Stop reason: HOSPADM

## 2023-12-17 RX ORDER — METOPROLOL SUCCINATE 25 MG/1
25 TABLET, EXTENDED RELEASE ORAL DAILY
Status: DISCONTINUED | OUTPATIENT
Start: 2023-12-18 | End: 2023-12-20 | Stop reason: HOSPADM

## 2023-12-17 RX ORDER — HYDROMORPHONE HYDROCHLORIDE 1 MG/ML
0.6 INJECTION, SOLUTION INTRAMUSCULAR; INTRAVENOUS; SUBCUTANEOUS EVERY 4 HOURS PRN
Status: DISCONTINUED | OUTPATIENT
Start: 2023-12-17 | End: 2023-12-20 | Stop reason: HOSPADM

## 2023-12-17 RX ORDER — FAMOTIDINE 20 MG/1
20 TABLET, FILM COATED ORAL 2 TIMES DAILY
Status: DISCONTINUED | OUTPATIENT
Start: 2023-12-17 | End: 2023-12-20 | Stop reason: HOSPADM

## 2023-12-17 RX ORDER — ENOXAPARIN SODIUM 100 MG/ML
40 INJECTION SUBCUTANEOUS EVERY 24 HOURS
Status: DISCONTINUED | OUTPATIENT
Start: 2023-12-17 | End: 2023-12-20 | Stop reason: HOSPADM

## 2023-12-17 RX ORDER — FAMOTIDINE 10 MG/ML
20 INJECTION INTRAVENOUS 2 TIMES DAILY
Status: DISCONTINUED | OUTPATIENT
Start: 2023-12-17 | End: 2023-12-20 | Stop reason: HOSPADM

## 2023-12-17 RX ORDER — TOPIRAMATE 100 MG/1
100 TABLET, FILM COATED ORAL 2 TIMES DAILY
Status: DISCONTINUED | OUTPATIENT
Start: 2023-12-17 | End: 2023-12-20 | Stop reason: HOSPADM

## 2023-12-17 RX ADMIN — TOPIRAMATE 100 MG: 100 TABLET ORAL at 21:48

## 2023-12-17 RX ADMIN — DOCUSATE SODIUM 100 MG: 100 CAPSULE, LIQUID FILLED ORAL at 21:48

## 2023-12-17 RX ADMIN — DIPHENHYDRAMINE HYDROCHLORIDE 25 MG: 25 CAPSULE ORAL at 15:18

## 2023-12-17 RX ADMIN — DIPHENHYDRAMINE HYDROCHLORIDE 25 MG: 25 CAPSULE ORAL at 21:55

## 2023-12-17 RX ADMIN — OXYCODONE HYDROCHLORIDE AND ACETAMINOPHEN 1 TABLET: 5; 325 TABLET ORAL at 19:56

## 2023-12-17 RX ADMIN — OXYCODONE HYDROCHLORIDE AND ACETAMINOPHEN 1 TABLET: 5; 325 TABLET ORAL at 15:18

## 2023-12-17 RX ADMIN — ENOXAPARIN SODIUM 40 MG: 40 INJECTION SUBCUTANEOUS at 21:48

## 2023-12-17 RX ADMIN — PREDNISONE 50 MG: 20 TABLET ORAL at 15:18

## 2023-12-17 RX ADMIN — FAMOTIDINE 20 MG: 20 TABLET, FILM COATED ORAL at 21:48

## 2023-12-17 RX ADMIN — GABAPENTIN 800 MG: 400 CAPSULE ORAL at 21:48

## 2023-12-17 RX ADMIN — ZOLPIDEM TARTRATE 10 MG: 5 TABLET ORAL at 21:48

## 2023-12-17 RX ADMIN — CYCLOBENZAPRINE 10 MG: 10 TABLET, FILM COATED ORAL at 21:55

## 2023-12-17 SDOH — ECONOMIC STABILITY: TRANSPORTATION INSECURITY
IN THE PAST 12 MONTHS, HAS THE LACK OF TRANSPORTATION KEPT YOU FROM MEDICAL APPOINTMENTS OR FROM GETTING MEDICATIONS?: PATIENT DECLINED

## 2023-12-17 SDOH — ECONOMIC STABILITY: INCOME INSECURITY: HOW HARD IS IT FOR YOU TO PAY FOR THE VERY BASICS LIKE FOOD, HOUSING, MEDICAL CARE, AND HEATING?: PATIENT DECLINED

## 2023-12-17 SDOH — SOCIAL STABILITY: SOCIAL INSECURITY: DO YOU FEEL ANYONE HAS EXPLOITED OR TAKEN ADVANTAGE OF YOU FINANCIALLY OR OF YOUR PERSONAL PROPERTY?: NO

## 2023-12-17 SDOH — ECONOMIC STABILITY: HOUSING INSECURITY: IN THE LAST 12 MONTHS, HOW MANY PLACES HAVE YOU LIVED?: 1

## 2023-12-17 SDOH — SOCIAL STABILITY: SOCIAL INSECURITY: WERE YOU ABLE TO COMPLETE ALL THE BEHAVIORAL HEALTH SCREENINGS?: YES

## 2023-12-17 SDOH — SOCIAL STABILITY: SOCIAL INSECURITY: ARE THERE ANY APPARENT SIGNS OF INJURIES/BEHAVIORS THAT COULD BE RELATED TO ABUSE/NEGLECT?: NO

## 2023-12-17 SDOH — SOCIAL STABILITY: SOCIAL INSECURITY: DO YOU FEEL UNSAFE GOING BACK TO THE PLACE WHERE YOU ARE LIVING?: NO

## 2023-12-17 SDOH — ECONOMIC STABILITY: INCOME INSECURITY: IN THE LAST 12 MONTHS, WAS THERE A TIME WHEN YOU WERE NOT ABLE TO PAY THE MORTGAGE OR RENT ON TIME?: PATIENT DECLINED

## 2023-12-17 SDOH — SOCIAL STABILITY: SOCIAL INSECURITY: HAVE YOU HAD THOUGHTS OF HARMING ANYONE ELSE?: NO

## 2023-12-17 SDOH — SOCIAL STABILITY: SOCIAL INSECURITY: DOES ANYONE TRY TO KEEP YOU FROM HAVING/CONTACTING OTHER FRIENDS OR DOING THINGS OUTSIDE YOUR HOME?: NO

## 2023-12-17 SDOH — SOCIAL STABILITY: SOCIAL INSECURITY: ABUSE: ADULT

## 2023-12-17 SDOH — ECONOMIC STABILITY: TRANSPORTATION INSECURITY
IN THE PAST 12 MONTHS, HAS LACK OF TRANSPORTATION KEPT YOU FROM MEETINGS, WORK, OR FROM GETTING THINGS NEEDED FOR DAILY LIVING?: PATIENT DECLINED

## 2023-12-17 SDOH — SOCIAL STABILITY: SOCIAL INSECURITY: HAS ANYONE EVER THREATENED TO HURT YOUR FAMILY OR YOUR PETS?: NO

## 2023-12-17 SDOH — SOCIAL STABILITY: SOCIAL INSECURITY: ARE YOU OR HAVE YOU BEEN THREATENED OR ABUSED PHYSICALLY, EMOTIONALLY, OR SEXUALLY BY ANYONE?: NO

## 2023-12-17 SDOH — ECONOMIC STABILITY: HOUSING INSECURITY
IN THE LAST 12 MONTHS, WAS THERE A TIME WHEN YOU DID NOT HAVE A STEADY PLACE TO SLEEP OR SLEPT IN A SHELTER (INCLUDING NOW)?: PATIENT DECLINED

## 2023-12-17 ASSESSMENT — COGNITIVE AND FUNCTIONAL STATUS - GENERAL
DAILY ACTIVITIY SCORE: 24
DAILY ACTIVITIY SCORE: 24
MOBILITY SCORE: 24
PATIENT BASELINE BEDBOUND: NO
MOBILITY SCORE: 24

## 2023-12-17 ASSESSMENT — PAIN - FUNCTIONAL ASSESSMENT
PAIN_FUNCTIONAL_ASSESSMENT: 0-10

## 2023-12-17 ASSESSMENT — PAIN SCALES - GENERAL
PAINLEVEL_OUTOF10: 10 - WORST POSSIBLE PAIN
PAINLEVEL_OUTOF10: 10 - WORST POSSIBLE PAIN
PAINLEVEL_OUTOF10: 8
PAINLEVEL_OUTOF10: 0 - NO PAIN
PAINLEVEL_OUTOF10: 10 - WORST POSSIBLE PAIN
PAINLEVEL_OUTOF10: 10 - WORST POSSIBLE PAIN

## 2023-12-17 ASSESSMENT — ACTIVITIES OF DAILY LIVING (ADL)
PATIENT'S MEMORY ADEQUATE TO SAFELY COMPLETE DAILY ACTIVITIES?: YES
LACK_OF_TRANSPORTATION: PATIENT DECLINED
GROOMING: INDEPENDENT
HEARING - RIGHT EAR: FUNCTIONAL
WALKS IN HOME: INDEPENDENT
FEEDING YOURSELF: INDEPENDENT
HEARING - LEFT EAR: FUNCTIONAL
ASSISTIVE_DEVICE: WALKER
JUDGMENT_ADEQUATE_SAFELY_COMPLETE_DAILY_ACTIVITIES: YES
ADEQUATE_TO_COMPLETE_ADL: YES
BATHING: INDEPENDENT
DRESSING YOURSELF: INDEPENDENT
TOILETING: INDEPENDENT

## 2023-12-17 ASSESSMENT — COLUMBIA-SUICIDE SEVERITY RATING SCALE - C-SSRS
6. HAVE YOU EVER DONE ANYTHING, STARTED TO DO ANYTHING, OR PREPARED TO DO ANYTHING TO END YOUR LIFE?: NO
1. IN THE PAST MONTH, HAVE YOU WISHED YOU WERE DEAD OR WISHED YOU COULD GO TO SLEEP AND NOT WAKE UP?: NO
5. HAVE YOU STARTED TO WORK OUT OR WORKED OUT THE DETAILS OF HOW TO KILL YOURSELF? DO YOU INTEND TO CARRY OUT THIS PLAN?: NO
4. HAVE YOU HAD THESE THOUGHTS AND HAD SOME INTENTION OF ACTING ON THEM?: NO
2. HAVE YOU ACTUALLY HAD ANY THOUGHTS OF KILLING YOURSELF?: NO

## 2023-12-17 ASSESSMENT — PAIN DESCRIPTION - ORIENTATION: ORIENTATION: RIGHT

## 2023-12-17 ASSESSMENT — LIFESTYLE VARIABLES
HOW MANY STANDARD DRINKS CONTAINING ALCOHOL DO YOU HAVE ON A TYPICAL DAY: PATIENT DOES NOT DRINK
AUDIT-C TOTAL SCORE: 0
HOW OFTEN DO YOU HAVE A DRINK CONTAINING ALCOHOL: NEVER
HOW OFTEN DO YOU HAVE 6 OR MORE DRINKS ON ONE OCCASION: NEVER
SKIP TO QUESTIONS 9-10: 1
AUDIT-C TOTAL SCORE: 0

## 2023-12-17 ASSESSMENT — PATIENT HEALTH QUESTIONNAIRE - PHQ9
2. FEELING DOWN, DEPRESSED OR HOPELESS: NOT AT ALL
1. LITTLE INTEREST OR PLEASURE IN DOING THINGS: NOT AT ALL
SUM OF ALL RESPONSES TO PHQ9 QUESTIONS 1 & 2: 0

## 2023-12-17 NOTE — ED TRIAGE NOTES
"C/O BACK PAIN, PT WAS SEEN IN THIS ED YESTERDAY FOR THE SAME COMPLAINT AND STATES SHE WAS UNABLE TO GET RX FILLED\"   "

## 2023-12-17 NOTE — ED PROVIDER NOTES
History/Exam limitations: none.   Additional history was obtained from patient, relative(s), and past medical records.    HPI:    Roby Qureshi is a 78 y.o. female PMH chronic back pain, lumbar radiculopathy seeing Dr. Smith from pain management presenting with worsening pain.  Patient was seen in the ED yesterday and states that she had significant improvement in pain with p.o. Percocet.  States that Dilaudid did not help her pain.  States that she was sent home with a prescription for p.o. Percocet but states that she is not functional enough to go to the pharmacy to pick it up.  States that she lives alone and family has not nearby.  States that the pain was so severe she had to call EMS to bring her back in.  No fever or chills.  No bowel or bladder dysfunction.  States she is getting around with a walker, reports some decree sensation in the right leg unchanged from prior and no focal weakness.  Pain is radiating from the right low back to the distal right lower extremity.  No recent trauma.         Physical Exam:  ED Triage Vitals   Temp Heart Rate Resp BP   -- 12/17/23 1441 12/17/23 1441 12/17/23 1441    67 16 137/70      SpO2 Temp src Heart Rate Source Patient Position   12/17/23 1444 -- 12/17/23 1441 12/17/23 1441   98 %  Monitor Lying      BP Location FiO2 (%)     12/17/23 1441 --     Left arm         GEN:      Alert, NAD  Eyes:       PERRL, EOMI  HENT:      NC/AT, OP clear, airway patent, MM  CV:      RRR, no MRG, no LE pitting edema, 2+ radial and pedal pulses  PULM:     CTAB, no w/r/r, easy WOB, symmetric chest rise  ABD:      Soft, NT, ND, no masses, BS +  :       No CVA TTP  NEURO:   A/Ox4, strength 5/5 in all 4 ext, SILT   MSK:      FROM, no joint deformities or swelling, no e/o trauma  SKIN:       Warm and dry  PSYCH:    Appropriate mood and affect         MDM/ED Course:   Roby Qureshi is a 78 y.o. female PMH chronic back pain, lumbar radiculopathy seeing Dr. Smith from pain  management presenting with worsening pain.  Vitals reassuring.  Exam as documented above.  No focal neurodeficits, no bowel or bladder dysfunction, low suspicion for cauda equina syndrome.  Patient states that her quality of pain is unchanged from chronic but has been gradually worsening.  Improved with p.o. Percocet outpatient however patient states that she is not functional enough to obtain this medication after time of discharge.  IV placed and labs drawn.  CBC with mild leukocytosis to 14.3 in setting of recent steroid use.  Patient has no recent infectious-like symptoms.  I have a low suspicion for epidural abscess given change quality of pain, no fevers.  Chemistry overall reassuring.  Patient was given p.o. Percocet, p.o. prednisone and requested dose of p.o. Benadryl with her medication due to side effects.  Was having frequent symptoms and required a second dose of p.o. Percocet.  Patient did not feel like she could function at home and requesting hospitalization believe is reasonable given recurrent recent hospital visits, patient living alone with no home support or family nearby.  Patient care signed to Dr. Soliman from the hospitalist service for continued management stable condition.     ED Course as of 12/20/23 1919   Sun Dec 17, 2023   9801 Patient consented to providers speaking to her family regarding workup and plan.  Per daughter, she is having surgery tomorrow and usually the point of contact, if needing to reach family - can reach out to patient's son if needed - [phone] ([place]).  [JM]   2251 Suspect leukocytosis related to steroid use. [JM]      ED Course User Index  [JM] Leonel Bhatti MD         Diagnoses as of 12/20/23 1919   Chronic right-sided low back pain with right-sided sciatica         Chronic medical conditions affecting care listed in MDM. I independently reviewed imaging studies and agreed with radiology reads. I reviewed recent and relevant outside records including  PCP notes, Prior discharge summaries, and prior radiology reports.    Procedure  Procedures    Diagnosis:   1.  Acute on chronic back pain    Dispo: Hospitalized in stable condition      Disclaimer: Portions of this note were dictated by speech recognition. An attempt at proof reading was made to minimize errors. Minor errors in transcription may be present.  Please call if questions.     Leonel Bhatti MD  12/20/23 5408

## 2023-12-18 LAB
ANION GAP SERPL CALC-SCNC: 15 MMOL/L (ref 10–20)
APPEARANCE UR: CLEAR
BILIRUB UR STRIP.AUTO-MCNC: NEGATIVE MG/DL
BUN SERPL-MCNC: 9 MG/DL (ref 6–23)
CALCIUM SERPL-MCNC: 8.9 MG/DL (ref 8.6–10.3)
CHLORIDE SERPL-SCNC: 104 MMOL/L (ref 98–107)
CO2 SERPL-SCNC: 27 MMOL/L (ref 21–32)
COLOR UR: YELLOW
CREAT SERPL-MCNC: 0.56 MG/DL (ref 0.5–1.05)
ERYTHROCYTE [DISTWIDTH] IN BLOOD BY AUTOMATED COUNT: 15.4 % (ref 11.5–14.5)
GFR SERPL CREATININE-BSD FRML MDRD: >90 ML/MIN/1.73M*2
GLUCOSE SERPL-MCNC: 90 MG/DL (ref 74–99)
GLUCOSE UR STRIP.AUTO-MCNC: NEGATIVE MG/DL
HCT VFR BLD AUTO: 37.9 % (ref 36–46)
HGB BLD-MCNC: 12.8 G/DL (ref 12–16)
KETONES UR STRIP.AUTO-MCNC: NEGATIVE MG/DL
LEUKOCYTE ESTERASE UR QL STRIP.AUTO: NEGATIVE
MCH RBC QN AUTO: 29.8 PG (ref 26–34)
MCHC RBC AUTO-ENTMCNC: 33.8 G/DL (ref 32–36)
MCV RBC AUTO: 88 FL (ref 80–100)
NITRITE UR QL STRIP.AUTO: NEGATIVE
NRBC BLD-RTO: 0 /100 WBCS (ref 0–0)
PH UR STRIP.AUTO: 6 [PH]
PLATELET # BLD AUTO: 294 X10*3/UL (ref 150–450)
POTASSIUM SERPL-SCNC: 3 MMOL/L (ref 3.5–5.3)
PROT UR STRIP.AUTO-MCNC: NEGATIVE MG/DL
RBC # BLD AUTO: 4.3 X10*6/UL (ref 4–5.2)
RBC # UR STRIP.AUTO: NEGATIVE /UL
SODIUM SERPL-SCNC: 143 MMOL/L (ref 136–145)
SP GR UR STRIP.AUTO: 1.01
UROBILINOGEN UR STRIP.AUTO-MCNC: <2 MG/DL
WBC # BLD AUTO: 11.1 X10*3/UL (ref 4.4–11.3)

## 2023-12-18 PROCEDURE — 2500000004 HC RX 250 GENERAL PHARMACY W/ HCPCS (ALT 636 FOR OP/ED): Performed by: INTERNAL MEDICINE

## 2023-12-18 PROCEDURE — 97161 PT EVAL LOW COMPLEX 20 MIN: CPT | Mod: GP

## 2023-12-18 PROCEDURE — 81003 URINALYSIS AUTO W/O SCOPE: CPT | Performed by: INTERNAL MEDICINE

## 2023-12-18 PROCEDURE — 80048 BASIC METABOLIC PNL TOTAL CA: CPT | Performed by: INTERNAL MEDICINE

## 2023-12-18 PROCEDURE — 99232 SBSQ HOSP IP/OBS MODERATE 35: CPT | Performed by: INTERNAL MEDICINE

## 2023-12-18 PROCEDURE — 36415 COLL VENOUS BLD VENIPUNCTURE: CPT | Performed by: INTERNAL MEDICINE

## 2023-12-18 PROCEDURE — 85027 COMPLETE CBC AUTOMATED: CPT | Performed by: INTERNAL MEDICINE

## 2023-12-18 PROCEDURE — G0378 HOSPITAL OBSERVATION PER HR: HCPCS

## 2023-12-18 PROCEDURE — 2500000002 HC RX 250 W HCPCS SELF ADMINISTERED DRUGS (ALT 637 FOR MEDICARE OP, ALT 636 FOR OP/ED): Mod: MUE | Performed by: INTERNAL MEDICINE

## 2023-12-18 PROCEDURE — 2500000001 HC RX 250 WO HCPCS SELF ADMINISTERED DRUGS (ALT 637 FOR MEDICARE OP): Performed by: INTERNAL MEDICINE

## 2023-12-18 PROCEDURE — 96372 THER/PROPH/DIAG INJ SC/IM: CPT | Performed by: INTERNAL MEDICINE

## 2023-12-18 RX ORDER — MICONAZOLE NITRATE 2 %
1 CREAM WITH APPLICATOR VAGINAL NIGHTLY
Status: DISCONTINUED | OUTPATIENT
Start: 2023-12-18 | End: 2023-12-20 | Stop reason: HOSPADM

## 2023-12-18 RX ADMIN — ZOLPIDEM TARTRATE 10 MG: 5 TABLET ORAL at 22:19

## 2023-12-18 RX ADMIN — FAMOTIDINE 20 MG: 20 TABLET, FILM COATED ORAL at 22:19

## 2023-12-18 RX ADMIN — Medication 1 APPLICATION: at 12:57

## 2023-12-18 RX ADMIN — DIPHENHYDRAMINE HYDROCHLORIDE 25 MG: 25 CAPSULE ORAL at 22:18

## 2023-12-18 RX ADMIN — GABAPENTIN 800 MG: 400 CAPSULE ORAL at 10:10

## 2023-12-18 RX ADMIN — OXYCODONE HYDROCHLORIDE AND ACETAMINOPHEN 1 TABLET: 5; 325 TABLET ORAL at 15:46

## 2023-12-18 RX ADMIN — CYCLOBENZAPRINE 10 MG: 10 TABLET, FILM COATED ORAL at 03:56

## 2023-12-18 RX ADMIN — METOPROLOL SUCCINATE 25 MG: 25 TABLET, EXTENDED RELEASE ORAL at 10:12

## 2023-12-18 RX ADMIN — DIPHENHYDRAMINE HYDROCHLORIDE 25 MG: 25 CAPSULE ORAL at 15:47

## 2023-12-18 RX ADMIN — GABAPENTIN 800 MG: 400 CAPSULE ORAL at 15:46

## 2023-12-18 RX ADMIN — CYCLOBENZAPRINE 10 MG: 10 TABLET, FILM COATED ORAL at 12:57

## 2023-12-18 RX ADMIN — DIPHENHYDRAMINE HYDROCHLORIDE 25 MG: 25 CAPSULE ORAL at 10:12

## 2023-12-18 RX ADMIN — ENOXAPARIN SODIUM 40 MG: 40 INJECTION SUBCUTANEOUS at 22:18

## 2023-12-18 RX ADMIN — TOPIRAMATE 100 MG: 100 TABLET ORAL at 10:11

## 2023-12-18 RX ADMIN — CYCLOBENZAPRINE 10 MG: 10 TABLET, FILM COATED ORAL at 22:19

## 2023-12-18 RX ADMIN — GABAPENTIN 800 MG: 400 CAPSULE ORAL at 22:18

## 2023-12-18 RX ADMIN — DOCUSATE SODIUM 100 MG: 100 CAPSULE, LIQUID FILLED ORAL at 10:12

## 2023-12-18 RX ADMIN — PREDNISONE 40 MG: 20 TABLET ORAL at 10:11

## 2023-12-18 RX ADMIN — OXYCODONE HYDROCHLORIDE AND ACETAMINOPHEN 1 TABLET: 5; 325 TABLET ORAL at 10:09

## 2023-12-18 RX ADMIN — VENLAFAXINE HYDROCHLORIDE 300 MG: 75 CAPSULE, EXTENDED RELEASE ORAL at 10:11

## 2023-12-18 RX ADMIN — DULOXETINE HYDROCHLORIDE 60 MG: 30 CAPSULE, DELAYED RELEASE ORAL at 10:11

## 2023-12-18 RX ADMIN — DOCUSATE SODIUM 100 MG: 100 CAPSULE, LIQUID FILLED ORAL at 22:18

## 2023-12-18 RX ADMIN — TOPIRAMATE 100 MG: 100 TABLET ORAL at 22:19

## 2023-12-18 RX ADMIN — FAMOTIDINE 20 MG: 20 TABLET, FILM COATED ORAL at 10:12

## 2023-12-18 SDOH — HEALTH STABILITY: MENTAL HEALTH: HOW OFTEN DO YOU HAVE A DRINK CONTAINING ALCOHOL?: NEVER

## 2023-12-18 SDOH — SOCIAL STABILITY: SOCIAL INSECURITY: WITHIN THE LAST YEAR, HAVE YOU BEEN HUMILIATED OR EMOTIONALLY ABUSED IN OTHER WAYS BY YOUR PARTNER OR EX-PARTNER?: NO

## 2023-12-18 SDOH — SOCIAL STABILITY: SOCIAL INSECURITY
WITHIN THE LAST YEAR, HAVE TO BEEN RAPED OR FORCED TO HAVE ANY KIND OF SEXUAL ACTIVITY BY YOUR PARTNER OR EX-PARTNER?: NO

## 2023-12-18 SDOH — SOCIAL STABILITY: SOCIAL NETWORK: ARE YOU MARRIED, WIDOWED, DIVORCED, SEPARATED, NEVER MARRIED, OR LIVING WITH A PARTNER?: DIVORCED

## 2023-12-18 SDOH — ECONOMIC STABILITY: INCOME INSECURITY: IN THE PAST 12 MONTHS, HAS THE ELECTRIC, GAS, OIL, OR WATER COMPANY THREATENED TO SHUT OFF SERVICE IN YOUR HOME?: NO

## 2023-12-18 SDOH — HEALTH STABILITY: MENTAL HEALTH: HOW MANY STANDARD DRINKS CONTAINING ALCOHOL DO YOU HAVE ON A TYPICAL DAY?: PATIENT DOES NOT DRINK

## 2023-12-18 SDOH — ECONOMIC STABILITY: FOOD INSECURITY: WITHIN THE PAST 12 MONTHS, YOU WORRIED THAT YOUR FOOD WOULD RUN OUT BEFORE YOU GOT MONEY TO BUY MORE.: NEVER TRUE

## 2023-12-18 SDOH — ECONOMIC STABILITY: TRANSPORTATION INSECURITY
IN THE PAST 12 MONTHS, HAS THE LACK OF TRANSPORTATION KEPT YOU FROM MEDICAL APPOINTMENTS OR FROM GETTING MEDICATIONS?: NO

## 2023-12-18 SDOH — SOCIAL STABILITY: SOCIAL INSECURITY
WITHIN THE LAST YEAR, HAVE YOU BEEN KICKED, HIT, SLAPPED, OR OTHERWISE PHYSICALLY HURT BY YOUR PARTNER OR EX-PARTNER?: NO

## 2023-12-18 SDOH — ECONOMIC STABILITY: HOUSING INSECURITY
IN THE LAST 12 MONTHS, WAS THERE A TIME WHEN YOU DID NOT HAVE A STEADY PLACE TO SLEEP OR SLEPT IN A SHELTER (INCLUDING NOW)?: NO

## 2023-12-18 SDOH — ECONOMIC STABILITY: FOOD INSECURITY: WITHIN THE PAST 12 MONTHS, THE FOOD YOU BOUGHT JUST DIDN'T LAST AND YOU DIDN'T HAVE MONEY TO GET MORE.: NEVER TRUE

## 2023-12-18 SDOH — SOCIAL STABILITY: SOCIAL INSECURITY: WITHIN THE LAST YEAR, HAVE YOU BEEN AFRAID OF YOUR PARTNER OR EX-PARTNER?: NO

## 2023-12-18 SDOH — HEALTH STABILITY: PHYSICAL HEALTH: ON AVERAGE, HOW MANY MINUTES DO YOU ENGAGE IN EXERCISE AT THIS LEVEL?: 0 MIN

## 2023-12-18 SDOH — HEALTH STABILITY: PHYSICAL HEALTH: ON AVERAGE, HOW MANY DAYS PER WEEK DO YOU ENGAGE IN MODERATE TO STRENUOUS EXERCISE (LIKE A BRISK WALK)?: 0 DAYS

## 2023-12-18 SDOH — HEALTH STABILITY: MENTAL HEALTH: HOW OFTEN DO YOU HAVE 6 OR MORE DRINKS ON ONE OCCASION?: NEVER

## 2023-12-18 SDOH — ECONOMIC STABILITY: INCOME INSECURITY: IN THE LAST 12 MONTHS, WAS THERE A TIME WHEN YOU WERE NOT ABLE TO PAY THE MORTGAGE OR RENT ON TIME?: NO

## 2023-12-18 SDOH — ECONOMIC STABILITY: HOUSING INSECURITY: IN THE LAST 12 MONTHS, HOW MANY PLACES HAVE YOU LIVED?: 1

## 2023-12-18 SDOH — ECONOMIC STABILITY: TRANSPORTATION INSECURITY
IN THE PAST 12 MONTHS, HAS LACK OF TRANSPORTATION KEPT YOU FROM MEETINGS, WORK, OR FROM GETTING THINGS NEEDED FOR DAILY LIVING?: NO

## 2023-12-18 SDOH — ECONOMIC STABILITY: INCOME INSECURITY: HOW HARD IS IT FOR YOU TO PAY FOR THE VERY BASICS LIKE FOOD, HOUSING, MEDICAL CARE, AND HEATING?: NOT HARD AT ALL

## 2023-12-18 ASSESSMENT — PAIN SCALES - GENERAL
PAINLEVEL_OUTOF10: 6
PAINLEVEL_OUTOF10: 8

## 2023-12-18 ASSESSMENT — PAIN DESCRIPTION - LOCATION: LOCATION: HIP

## 2023-12-18 ASSESSMENT — PAIN - FUNCTIONAL ASSESSMENT
PAIN_FUNCTIONAL_ASSESSMENT: 0-10
PAIN_FUNCTIONAL_ASSESSMENT: 0-10

## 2023-12-18 ASSESSMENT — COGNITIVE AND FUNCTIONAL STATUS - GENERAL
CLIMB 3 TO 5 STEPS WITH RAILING: A LITTLE
MOBILITY SCORE: 23
MOBILITY SCORE: 24
DAILY ACTIVITIY SCORE: 24

## 2023-12-18 ASSESSMENT — LIFESTYLE VARIABLES
SKIP TO QUESTIONS 9-10: 1
AUDIT-C TOTAL SCORE: 0

## 2023-12-18 ASSESSMENT — ACTIVITIES OF DAILY LIVING (ADL)
ADL_ASSISTANCE: INDEPENDENT
LACK_OF_TRANSPORTATION: NO

## 2023-12-18 ASSESSMENT — PAIN DESCRIPTION - ORIENTATION: ORIENTATION: RIGHT

## 2023-12-18 NOTE — H&P
History Of Present Illness  Roby Qureshi is a 78 y.o. female with past medical history of lumbar pain, status post laminectomy, radiculopathy of the lower extremities, presenting with complaint of excruciating back pain.  Patient stated that she follows with pain management, and was initially getting epidural injections, which were effective in the past but now not preventing her symptoms.  Stated that it is pain management has referred her to a surgeon, who recommended spinal stimulator placement.  Stated that she was hesitant at first, but now considering doing the procedure, scheduling follow-up.  Patient stated that her first back surgery was done in [place], and has subsequent surgeries with orthopedics at [facility].  EMR shows that       Past Medical History  Past Medical History:   Diagnosis Date    Personal history of other diseases of the respiratory system 09/14/2020    History of acute bronchitis    Unspecified fall, initial encounter 12/02/2021    Accidental fall, initial encounter       Surgical History  Past Surgical History:   Procedure Laterality Date    OTHER SURGICAL HISTORY  01/08/2020    Spinal surgery    OTHER SURGICAL HISTORY  01/08/2020    Hysterectomy    OTHER SURGICAL HISTORY  01/08/2020    Femur fracture repair        Social History  She reports that she has never smoked. She has never used smokeless tobacco. She reports that she does not currently use alcohol. She reports that she does not use drugs.    Family History  Family History   Problem Relation Name Age of Onset    Other (Cardiac Disorder) Mother          CABG; Defribillator in place    Coronary artery disease Mother      Other (Cardiac Disorder) Father          CABG; Defribillator in place    Coronary artery disease Father      Heart attack Father  54    Ovarian cancer Maternal Grandmother      Alzheimer's disease Other Aunt         Allergies  Oxycodone    Review of Systems  10 point review of system was conducted patient  "admitted to symptom as above and denies any other symptoms at this time.  Physical Exam   Constitutional: Alert active, cooperative not in acute distress  Eyes: PERRLA, clear sclera  ENMT: Moist mucosal membranes, no exudate  Head / Neck: Atraumatic, normocephalic, supple neck, JVP not visualized  Lungs: Patent airways, CTABL  Heart: RRR, S1S2, no murmurs appreciated, palpable pulses in all extremities  GI: Soft, NT, ND, bowel sounds present in all quadrants  MSK: Moves all extremities freely, no restriction  of ROM, no joint edema  Extremities: Intact x 4, no peripheral edema  : No Crouch catheter inserted  Breast: Deferred  Neurological: AAO x 3 to person, place and date, facial muscles symmetrical, sensation intact, strength 4/4, no acute focal neurological deficits appreciated  Psychological: Appropriate mood and behavior    Last Recorded Vitals  Blood pressure 139/67, pulse 62, temperature 37 °C (98.6 °F), temperature source Oral, resp. rate 13, height 1.64 m (5' 4.57\"), weight 63 kg (139 lb), SpO2 95 %.    Relevant Results      Scheduled medications  calcium polycarbophiL, 625 mg, oral, Daily  cyclobenzaprine, 10 mg, oral, q8h  diphenhydrAMINE, 25 mg, oral, q6h  docusate sodium, 100 mg, oral, BID  DULoxetine, 60 mg, oral, q AM  enoxaparin, 40 mg, subcutaneous, q24h  famotidine, 20 mg, oral, BID   Or  famotidine, 20 mg, intravenous, BID  gabapentin, 800 mg, oral, TID  metoprolol succinate XL, 25 mg, oral, Daily  miconazole, 1 applicator, vaginal, Nightly  predniSONE, 40 mg, oral, Daily  topiramate, 100 mg, oral, BID  venlafaxine XR, 300 mg, oral, Daily  zolpidem, 10 mg, oral, Nightly      Continuous medications     PRN medications  PRN medications: HYDROmorphone, hydrOXYzine pamoate, oxyCODONE-acetaminophen  CT angio chest abdomen pelvis    Result Date: 12/16/2023  Interpreted By:  Carly Andrade, STUDY: CT ANGIO CHEST ABDOMEN PELVIS; ;  12/16/2023 3:09 pm   INDICATION: Signs/Symptoms:Low back and abdominal " pain.  Rule out dissection.   COMPARISON: CT abdomen pelvis 11/14/2023   ACCESSION NUMBER(S): [id number]   ORDERING CLINICIAN: ATA ALVARES   TECHNIQUE: Imaging was performed from thoracic apex through ischial tuberosities prior to intravenous contrast and with intravenous administration of 90 mL Omnipaque 350, timed for arterial opacification. Multi planer reconstructions were made.   FINDINGS: Streak artifact is present at the base of the cervical spine and thoracolumbar junction through the SI joints related to fixation hardware in the spine.   There is no aneurysmal dilatation of the aorta. The aorta is tortuous. There is mild patchy atherosclerotic calcification. There is no displaced intimal calcification identified.   Postcontrast the aorta opacifies homogeneously with no evidence of dissection. Right innominate artery, left common carotid artery and left subclavian artery are widely patent and arise in typical fashion from the aortic arch. Celiac artery, superior mesenteric artery, bilateral single renal arteries and inferior mesenteric artery are all patent.   Trachea and mainstem bronchi are patent. No endoluminal mass is identified. Lungs are clear. No pleural effusion is present.   Thyroid gland is grossly normal in appearance. There are no pathologically enlarged mediastinal or hilar lymph nodes. Esophagus appears grossly normal.   Main pulmonary artery is normal in size. Heart is normal in size. There is no coronary calcification. There is no pericardial effusion.   There is peripheral enhancement postcontrast in the patient's right hepatic hemangioma which measures about 5.3 cm maximum diameter on axial images and is not significantly changed. There is no intra or extrahepatic biliary dilatation. Gallbladder contains at least 1 stone which has peripheral calcification and measures 1.7 cm.   No mass or inflammation is noted involving the pancreas.   Spleen is normal in size with no focal mass noted.    Adrenal glands appear normal. Kidneys enhance symmetrically with no hydronephrosis noted. 1.4 cm mass lateral mid left kidney is unchanged, with internal density of 40 Hounsfield units, not compatible with a simple cyst.   Bowel loops are nondilated. Appendix is not identified. No ascites, pneumoperitoneum or mesenteric inflammation is identified.   Aorta and vena cava are normal in size. There are no pathologically enlarged retroperitoneal, iliac or inguinal lymph nodes identified.   Urinary bladder is normal in appearance with no wall thickening.   Prostate gland measures 2.8 x 2.6 x 4.5 cm.   No abdominal wall hernia is identified.   Diffuse severe disc and endplate degenerative changes are present. There are pedicle screws with posterior fixation rods extending between T10 and S1 as well as screws across the sacroiliac joints bilaterally. The lower portion of cervical fixation hardware is also noted. No acute osseous abnormality is identified.       There is no dissection or aneurysm of the aorta   Unchanged hemangioma right hepatic lobe   1.4 cm mass of the left kidney is unchanged and has density which is incompatible with a simple cyst. Further evaluation with MRI is recommended to determine if this is a complicated cyst or a tumor.     MACRO: None.   Signed by: Carly Andrade 12/16/2023 3:40 PM Dictation workstation:   [id number]    CT cervical spine wo IV contrast    Result Date: 12/14/2023  Interpreted By:  Vishal Wong, STUDY: CT CERVICAL SPINE WO IV CONTRAST; CT THORACIC SPINE WO IV CONTRAST; CT LUMBAR SPINE WO IV CONTRAST;  12/14/2023 9:43 pm   INDICATION: Signs/Symptoms:pain.   COMPARISON: Prior MRI   ACCESSION NUMBER(S): [id number]; [id number]; [id number]   ORDERING CLINICIAN: BIENVENIDO WHITMORE   TECHNIQUE: Axial CT images of the cervical thoracic and lumbar spine are obtained. Axial, coronal and sagittal reconstructions are provided for review.   FINDINGS: Cervical spine: Straightening of the  cervical spine. Fusion changes seen C3-4 which appears to be chronic. Posterior decompression C4 through C7. No findings of acute fracture. If concern for nerve root impingement, consider MRI. Degenerative change seen at the dens.   Thoracic spine postsurgical changes seen from T10 through the sacrum posteriorly. Wedging of T10. Diffuse demineralization of the bones. No acute osseous abnormality   Evaluation of the lumbar spine shows extensive demineralization. Multilevel degenerative disc disease is seen similar to prior MRI of the lumbar spine. No acute osseous abnormality. Left renal lesion not well interrogated       As seen on prior CT similar appearance of the cervical, thoracic and lumbar spine to prior MRI with extensive postsurgical changes. Demineralization. Scattered degenerative changes. No significant change when compared to prior.. If concern for new pathology, further evaluation is advised with follow-up MRI   MACRO: None   Signed by: Vishal Wong 12/14/2023 11:00 PM Dictation workstation:   [id number]    CT thoracic spine wo IV contrast    Result Date: 12/14/2023  Interpreted By:  Vishal Wong, STUDY: CT CERVICAL SPINE WO IV CONTRAST; CT THORACIC SPINE WO IV CONTRAST; CT LUMBAR SPINE WO IV CONTRAST;  12/14/2023 9:43 pm   INDICATION: Signs/Symptoms:pain.   COMPARISON: Prior MRI   ACCESSION NUMBER(S): [id number]; [id number]; [id number]   ORDERING CLINICIAN: BIENVENIDO WHITMORE   TECHNIQUE: Axial CT images of the cervical thoracic and lumbar spine are obtained. Axial, coronal and sagittal reconstructions are provided for review.   FINDINGS: Cervical spine: Straightening of the cervical spine. Fusion changes seen C3-4 which appears to be chronic. Posterior decompression C4 through C7. No findings of acute fracture. If concern for nerve root impingement, consider MRI. Degenerative change seen at the dens.   Thoracic spine postsurgical changes seen from T10 through the sacrum posteriorly. Wedging  of T10. Diffuse demineralization of the bones. No acute osseous abnormality   Evaluation of the lumbar spine shows extensive demineralization. Multilevel degenerative disc disease is seen similar to prior MRI of the lumbar spine. No acute osseous abnormality. Left renal lesion not well interrogated       As seen on prior CT similar appearance of the cervical, thoracic and lumbar spine to prior MRI with extensive postsurgical changes. Demineralization. Scattered degenerative changes. No significant change when compared to prior.. If concern for new pathology, further evaluation is advised with follow-up MRI   MACRO: None   Signed by: Vishal Wong 12/14/2023 11:00 PM Dictation workstation:   [id number]    CT lumbar spine wo IV contrast    Result Date: 12/14/2023  Interpreted By:  Vishal Wong, STUDY: CT CERVICAL SPINE WO IV CONTRAST; CT THORACIC SPINE WO IV CONTRAST; CT LUMBAR SPINE WO IV CONTRAST;  12/14/2023 9:43 pm   INDICATION: Signs/Symptoms:pain.   COMPARISON: Prior MRI   ACCESSION NUMBER(S): [id number]; [id number]; [id number]   ORDERING CLINICIAN: BIENVENIDO WHITMORE   TECHNIQUE: Axial CT images of the cervical thoracic and lumbar spine are obtained. Axial, coronal and sagittal reconstructions are provided for review.   FINDINGS: Cervical spine: Straightening of the cervical spine. Fusion changes seen C3-4 which appears to be chronic. Posterior decompression C4 through C7. No findings of acute fracture. If concern for nerve root impingement, consider MRI. Degenerative change seen at the dens.   Thoracic spine postsurgical changes seen from T10 through the sacrum posteriorly. Wedging of T10. Diffuse demineralization of the bones. No acute osseous abnormality   Evaluation of the lumbar spine shows extensive demineralization. Multilevel degenerative disc disease is seen similar to prior MRI of the lumbar spine. No acute osseous abnormality. Left renal lesion not well interrogated       As seen on prior CT  similar appearance of the cervical, thoracic and lumbar spine to prior MRI with extensive postsurgical changes. Demineralization. Scattered degenerative changes. No significant change when compared to prior.. If concern for new pathology, further evaluation is advised with follow-up MRI   MACRO: None   Signed by: Vishal Wong 12/14/2023 11:00 PM Dictation workstation:   [id number]    XR chest 1 view    Result Date: 12/14/2023  STUDY: Chest Radiograph;  12/14/2023, 7:55PM INDICATION: Shortness of breath and pain. COMPARISON: 11/15/2021 XR Chest ACCESSION NUMBER(S): [id number] ORDERING CLINICIAN: BIENVENIDO WHITMORE TECHNIQUE:  Frontal chest was obtained at 19:54 hours. FINDINGS: CARDIOMEDIASTINAL SILHOUETTE: Cardiomediastinal silhouette is normal in size and configuration.  LUNGS: Lungs are clear.  There does not appear to be any pleural effusion or pneumothorax.  ABDOMEN: No remarkable upper abdominal findings.  BONES: No acute osseous changes.  Partially imaged today are postoperative changes in the lower cervical spine.    No acute cardiopulmonary disease. Signed by Duke Rubio MD    [place] pain management TC    Result Date: 11/20/2023  These images are not reportable by radiology and will not be interpreted by  Radiologists.        Assessment/Plan   Principal Problem:    Chronic right-sided low back pain with right-sided sciatica    78 y.o. female with past medical history of lumbar pain, status post laminectomy, radiculopathy of the lower extremities, presenting with complaint of excruciating back pain.     Chronic back pain: Presenting with excruciating sciatica pain  -History of multiple surgeries and epidural injections  -States following the surgeon for spinal nerve stimulation  -Dilaudid 0.6 mg IV push every 4 hours as needed severe pain  -Percocet 5-325 mg 1 tablet every 6 hours as needed breakthrough pain  -Cyclobenzaprine 10 mg 3 times daily as needed muscle spasm  -Continue duloxetine 60 mg  daily  -Gabapentin 800 mg daily  -Pain management consulted: Impression pending  -PT OT: Impression pending    History of arrhythmia  -On metoprolol 25 mg daily    Depression  -Venlafaxine  mg daily  -Topiramate 100 mg daily, also off label use for pain management    Insomnia  -Zolpidem 10 mg at bedtime    Diet  -Regular    DVT prophylaxis  -Lovenox 40 mg subcu daily    CODE STATUS: Full    Disposition: Presenting with chronic low back pain worsening psychiatrical, need further evaluation and management, discharge pending pain management evaluation and recommendation, may need SNF if meets PT OT criteria for, otherwise will be discharged home with home health and PT OT       I spent 50 minutes in the professional and overall care of this patient.      Bandar Soliman, DO

## 2023-12-18 NOTE — PROGRESS NOTES
12/18/23 1005   Discharge Planning   Living Arrangements Alone   Support Systems Children   Assistance Needed none   Type of Residence Private residence   Number of Stairs to Enter Residence 0   Number of Stairs Within Residence 0   Do you have animals or pets at home? Yes   Type of Animals or Pets one dog   Who is requesting discharge planning? Patient   Home or Post Acute Services Post acute facilities (Rehab/SNF/etc);In home services   Type of Post Acute Facility Services Skilled nursing   Type of Home Care Services Home OT;Home PT;Home health aide   Patient expects to be discharged to: home   Does the patient need discharge transport arranged? Yes   RoundTrip coordination needed? Yes   Has discharge transport been arranged? No   Financial Resource Strain   How hard is it for you to pay for the very basics like food, housing, medical care, and heating? Not hard   Housing Stability   In the last 12 months, was there a time when you were not able to pay the mortgage or rent on time? N   In the last 12 months, how many places have you lived? 1   In the last 12 months, was there a time when you did not have a steady place to sleep or slept in a shelter (including now)? N   Transportation Needs   In the past 12 months, has lack of transportation kept you from medical appointments or from getting medications? no   In the past 12 months, has lack of transportation kept you from meetings, work, or from getting things needed for daily living? No   Patient Choice   Provider Choice list and CMS website (https://medicare.gov/care-compare#search) for post-acute Quality and Resource Measure Data were provided and reviewed with: Patient   Patient / Family choosing to utilize agency / facility established prior to hospitalization No     12/18/23 Fernando  Met with patient at bedside to discuss discharge planning.  Patient lives at [facility].  Her apartment is on the 3rd floor and there is an elevator.  She is  independent with ADLs at baseline.  She uses a rollator for ambulation.  She has to rely on transportation at [facility] to go places.  She said it is not always reliable.  She states that she needs help at home.  Offered her resources for private duty aides.  She states that she does not have funds to pay for any assistance.  She is not willing to move into AL because she wants to keep her dog.  Will ask SW to follow up with her regarding resources.  She states that she wants someone to operate on her back and fix the loose screw that is pushing on her nerve as well as have a nerve stimulator implanted.  She had her initial back surgery at [facility] with Dr Cardenas but her pain management doctor is Dr Smith through .  Will wait for PT/OT evals as well.  Desi CEDILLO    12/18/23 1032  Patient just seen by PT.  Able to get up and move independently around the room with her rollator.  They will discharge her from therapy as she has no therapy needs at this time.  She will go home with no needs.  Desi CEDILLO    12/18/23 6650  Received secure chat that daughter Bridget ([phone]) would like to be contacted regarding her mom and the discharge plan.  Message left.  Dsei CEDILLO

## 2023-12-18 NOTE — CARE PLAN
Problem: Pain - Adult  Goal: Verbalizes/displays adequate comfort level or baseline comfort level  Outcome: Progressing     Problem: Safety - Adult  Goal: Free from fall injury  Outcome: Progressing     Problem: Discharge Planning  Goal: Discharge to home or other facility with appropriate resources  Outcome: Progressing     Problem: Chronic Conditions and Co-morbidities  Goal: Patient's chronic conditions and co-morbidity symptoms are monitored and maintained or improved  Outcome: Progressing     Problem: Pain  Goal: Takes deep breaths with improved pain control throughout the shift  Outcome: Progressing  Goal: Turns in bed with improved pain control throughout the shift  Outcome: Progressing  Goal: Walks with improved pain control throughout the shift  Outcome: Progressing  Goal: Performs ADL's with improved pain control throughout shift  Outcome: Progressing  Goal: Participates in PT with improved pain control throughout the shift  Outcome: Progressing  Goal: Free from opioid side effects throughout the shift  Outcome: Progressing  Goal: Free from acute confusion related to pain meds throughout the shift  Outcome: Progressing     Problem: Fall/Injury  Goal: Not fall by end of shift  Outcome: Progressing  Goal: Be free from injury by end of the shift  Outcome: Progressing  Goal: Verbalize understanding of personal risk factors for fall in the hospital  Outcome: Progressing  Goal: Verbalize understanding of risk factor reduction measures to prevent injury from fall in the home  Outcome: Progressing  Goal: Use assistive devices by end of the shift  Outcome: Progressing  Goal: Pace activities to prevent fatigue by end of the shift  Outcome: Progressing

## 2023-12-18 NOTE — PROGRESS NOTES
Physical Therapy    Physical Therapy Evaluation    Patient Name: Roby Qureshi  MRN: [id number]  Today's Date: 12/18/2023   Time Calculation  Start Time: 1013  Stop Time: 1030  Time Calculation (min): 17 min    Assessment/Plan   PT Assessment  Rehab Prognosis: Good  Evaluation/Treatment Tolerance: Patient tolerated treatment well, Patient limited by pain  Medical Staff Made Aware: Yes  Strengths: Housing layout  Barriers to Participation: Insight into problems, Coping skills  End of Session Communication: Bedside nurse, Care Coordinator  Assessment Comment: PT eval completed. Pt reporting increased difficulty due to pain and hardware issue in low back, but does not appear to significantly make functional mobility unsafe or need for hands on assist at this time. No acute PT needs at this time.  End of Session Patient Position: Bed, 3 rail up, Alarm off, not on at start of session  IP OR SWING BED PT PLAN  Inpatient or Swing Bed: Inpatient  PT Plan  PT Plan: PT Eval only  PT Eval Only Reason: No acute PT needs identified  PT Frequency: PT eval only  PT Discharge Recommendations: Low intensity level of continued care  Equipment Recommended upon Discharge:  (Rollator)  PT Recommended Transfer Status: Stand by assist  PT - OK to Discharge: Yes (Per PT POC)      Subjective   General Visit Information:  General  Reason for Referral: Impaired Mobility  Referred By: Dr. Soliman  Past Medical History Relevant to Rehab:   Past Medical History:   Diagnosis Date    Personal history of other diseases of the respiratory system 09/14/2020    History of acute bronchitis    Unspecified fall, initial encounter 12/02/2021    Accidental fall, initial encounter     Past Surgical History:   Procedure Laterality Date    OTHER SURGICAL HISTORY  01/08/2020    Spinal surgery    OTHER SURGICAL HISTORY  01/08/2020    Hysterectomy    OTHER SURGICAL HISTORY  01/08/2020    Femur fracture repair       Family/Caregiver Present: No  Prior to  Session Communication: Bedside nurse  Patient Position Received: Bed, 3 rail up, Alarm off, caregiver present  Preferred Learning Style: verbal, visual  General Comment: Pt supine in bed when PT entered, reporting pain, numbness, and difficulty with daily tasks, but demo's appropriate mobility with use of rollator, no hands on assist needed.  Home Living:  Home Living  Type of Home: Apartment (Senior living apartments)  Lives With: Alone  Home Adaptive Equipment:  (Rollator)  Home Layout: One level  Home Access: Elevator  Home Living Comments: Pt reports home setup appropriate for needs  Prior Level of Function:  Prior Function Per Pt/Caregiver Report  Level of [place]: Independent with ADLs and functional transfers, Independent with homemaking with ambulation  ADL Assistance: Independent  Homemaking Assistance: Independent  Ambulatory Assistance:  (Use of rollator at baseline, denies falls.)  Prior Function Comments: Reports MOD I /c ADLs and IADLs, but more difficult. Has assist for laundry and senior living facility makes meals, but pt does not enjoy their food. Has transport via facility as well.  Precautions:  Precautions  Medical Precautions: Fall precautions  Vital Signs:       Objective   Pain:  Pain Assessment  Pain Assessment: 0-10  Pain Score:  (Not rated at this time)  Pain Type: Chronic pain  Pain Location: Back  Cognition:  Cognition  Overall Cognitive Status: Within Functional Limits    General Assessments:                  Activity Tolerance  Endurance: Tolerates 10 - 20 min exercise with multiple rests    Sensation  Sensation Comment: Reports decreased sensation to RLE from hip to toes, but protective sensation still intact    Strength  Strength Comments: WFL for functional mobility           Coordination  Movements are Fluid and Coordinated: Yes    Postural Control  Postural Control: Within Functional Limits  Head Control: WFL  Trunk Control: WFL    Static Sitting Balance  Static  Sitting-Balance Support: Feet supported, No upper extremity supported  Static Sitting-Level of Assistance: Modified independent  Dynamic Sitting Balance  Dynamic Sitting-Balance Support: Feet supported, No upper extremity supported  Dynamic Sitting-Balance: Forward lean    Static Standing Balance  Static Standing-Balance Support: Bilateral upper extremity supported  Static Standing-Level of Assistance: Modified independent  Dynamic Standing Balance  Dynamic Standing-Balance Support: Bilateral upper extremity supported  Dynamic Standing-Balance: Turning  Functional Assessments:  Bed Mobility  Bed Mobility: Yes  Bed Mobility 1  Bed Mobility 1: Supine to sitting, Sitting to supine  Level of Assistance 1: Modified independent  Bed Mobility Comments 1: No hands on assist, no increased time needs, pt with facial grimacing, but no overt reports of pain    Transfers  Transfer: Yes  Transfer 1  Technique 1: Sit to stand, Stand to sit  Transfer Device 1:  (rollator)  Transfer Level of Assistance 1: Modified independent  Trials/Comments 1: No hands on assist needed, pt cued for hand placement    Ambulation/Gait Training  Ambulation/Gait Training Performed: Yes  Ambulation/Gait Training 1  Surface 1: Level tile  Device 1:  (rollator)  Assistance 1: Modified independent  Quality of Gait 1: Diminished heel strike, Decreased step length, Shuffling gait, Forward flexed posture (No hands on assist, no LOB or gross instability)  Comments/Distance (ft) 1: 25'    Stairs  Stairs: No (Not part of home setup)  Extremity/Trunk Assessments:  RUE   RUE : Within Functional Limits  LUE   LUE: Within Functional Limits  RLE   RLE : Within Functional Limits  LLE   LLE : Within Functional Limits  Outcome Measures:  [facility] Basic Mobility  Turning from your back to your side while in a flat bed without using bedrails: None  Moving from lying on your back to sitting on the side of a flat bed without using bedrails: None  Moving to and from bed to  chair (including a wheelchair): None  Standing up from a chair using your arms (e.g. wheelchair or bedside chair): None  To walk in hospital room: None  Climbing 3-5 steps with railing: A little  Basic Mobility - Total Score: 23    Encounter Problems       Encounter Problems (Active)       Pain - Adult              Education Documentation  Precautions, taught by Cullen Bey, PT at 12/18/2023  1:57 PM.  Learner: Patient  Readiness: Acceptance  Method: Demonstration, Explanation  Response: Verbalizes Understanding    Body Mechanics, taught by Cullen Bey, PT at 12/18/2023  1:57 PM.  Learner: Patient  Readiness: Acceptance  Method: Demonstration, Explanation  Response: Verbalizes Understanding    Mobility Training, taught by Cullen Bey, PT at 12/18/2023  1:57 PM.  Learner: Patient  Readiness: Acceptance  Method: Demonstration, Explanation  Response: Verbalizes Understanding    Education Comments  No comments found.

## 2023-12-19 LAB
ANION GAP SERPL CALC-SCNC: 14 MMOL/L (ref 10–20)
BUN SERPL-MCNC: 12 MG/DL (ref 6–23)
CALCIUM SERPL-MCNC: 8.9 MG/DL (ref 8.6–10.3)
CHLORIDE SERPL-SCNC: 101 MMOL/L (ref 98–107)
CO2 SERPL-SCNC: 27 MMOL/L (ref 21–32)
CREAT SERPL-MCNC: 0.53 MG/DL (ref 0.5–1.05)
ERYTHROCYTE [DISTWIDTH] IN BLOOD BY AUTOMATED COUNT: 15.9 % (ref 11.5–14.5)
GFR SERPL CREATININE-BSD FRML MDRD: >90 ML/MIN/1.73M*2
GLUCOSE SERPL-MCNC: 90 MG/DL (ref 74–99)
HCT VFR BLD AUTO: 40.9 % (ref 36–46)
HGB BLD-MCNC: 12.6 G/DL (ref 12–16)
HOLD SPECIMEN: NORMAL
MCH RBC QN AUTO: 29.6 PG (ref 26–34)
MCHC RBC AUTO-ENTMCNC: 30.8 G/DL (ref 32–36)
MCV RBC AUTO: 96 FL (ref 80–100)
NRBC BLD-RTO: 0 /100 WBCS (ref 0–0)
PLATELET # BLD AUTO: 231 X10*3/UL (ref 150–450)
POTASSIUM SERPL-SCNC: 3.2 MMOL/L (ref 3.5–5.3)
RBC # BLD AUTO: 4.25 X10*6/UL (ref 4–5.2)
SODIUM SERPL-SCNC: 139 MMOL/L (ref 136–145)
WBC # BLD AUTO: 11.7 X10*3/UL (ref 4.4–11.3)

## 2023-12-19 PROCEDURE — 2500000004 HC RX 250 GENERAL PHARMACY W/ HCPCS (ALT 636 FOR OP/ED): Mod: MUE | Performed by: INTERNAL MEDICINE

## 2023-12-19 PROCEDURE — 80048 BASIC METABOLIC PNL TOTAL CA: CPT | Performed by: INTERNAL MEDICINE

## 2023-12-19 PROCEDURE — G0378 HOSPITAL OBSERVATION PER HR: HCPCS

## 2023-12-19 PROCEDURE — 2500000001 HC RX 250 WO HCPCS SELF ADMINISTERED DRUGS (ALT 637 FOR MEDICARE OP): Performed by: INTERNAL MEDICINE

## 2023-12-19 PROCEDURE — 99232 SBSQ HOSP IP/OBS MODERATE 35: CPT | Performed by: INTERNAL MEDICINE

## 2023-12-19 PROCEDURE — 2500000004 HC RX 250 GENERAL PHARMACY W/ HCPCS (ALT 636 FOR OP/ED): Performed by: INTERNAL MEDICINE

## 2023-12-19 PROCEDURE — 36415 COLL VENOUS BLD VENIPUNCTURE: CPT | Performed by: INTERNAL MEDICINE

## 2023-12-19 PROCEDURE — 99221 1ST HOSP IP/OBS SF/LOW 40: CPT | Performed by: ANESTHESIOLOGY

## 2023-12-19 PROCEDURE — 85027 COMPLETE CBC AUTOMATED: CPT | Performed by: INTERNAL MEDICINE

## 2023-12-19 PROCEDURE — 96372 THER/PROPH/DIAG INJ SC/IM: CPT | Performed by: INTERNAL MEDICINE

## 2023-12-19 PROCEDURE — 2500000002 HC RX 250 W HCPCS SELF ADMINISTERED DRUGS (ALT 637 FOR MEDICARE OP, ALT 636 FOR OP/ED): Mod: MUE | Performed by: INTERNAL MEDICINE

## 2023-12-19 RX ORDER — POTASSIUM CHLORIDE 20 MEQ/1
40 TABLET, EXTENDED RELEASE ORAL ONCE
Status: COMPLETED | OUTPATIENT
Start: 2023-12-19 | End: 2023-12-19

## 2023-12-19 RX ORDER — SENNOSIDES 8.6 MG/1
1 TABLET ORAL NIGHTLY
Status: DISCONTINUED | OUTPATIENT
Start: 2023-12-19 | End: 2023-12-20 | Stop reason: HOSPADM

## 2023-12-19 RX ORDER — BISACODYL 10 MG/1
10 SUPPOSITORY RECTAL DAILY
Status: DISCONTINUED | OUTPATIENT
Start: 2023-12-19 | End: 2023-12-20 | Stop reason: HOSPADM

## 2023-12-19 RX ADMIN — BISACODYL 10 MG: 10 SUPPOSITORY RECTAL at 16:25

## 2023-12-19 RX ADMIN — ZOLPIDEM TARTRATE 10 MG: 5 TABLET ORAL at 21:45

## 2023-12-19 RX ADMIN — DOCUSATE SODIUM 100 MG: 100 CAPSULE, LIQUID FILLED ORAL at 21:45

## 2023-12-19 RX ADMIN — PREDNISONE 40 MG: 20 TABLET ORAL at 10:19

## 2023-12-19 RX ADMIN — SENNOSIDES 8.6 MG: 8.6 TABLET, FILM COATED ORAL at 21:45

## 2023-12-19 RX ADMIN — DIPHENHYDRAMINE HYDROCHLORIDE 25 MG: 25 CAPSULE ORAL at 21:45

## 2023-12-19 RX ADMIN — DIPHENHYDRAMINE HYDROCHLORIDE 25 MG: 25 CAPSULE ORAL at 16:26

## 2023-12-19 RX ADMIN — GABAPENTIN 800 MG: 400 CAPSULE ORAL at 21:45

## 2023-12-19 RX ADMIN — FAMOTIDINE 20 MG: 20 TABLET, FILM COATED ORAL at 21:45

## 2023-12-19 RX ADMIN — OXYCODONE HYDROCHLORIDE AND ACETAMINOPHEN 1 TABLET: 5; 325 TABLET ORAL at 16:25

## 2023-12-19 RX ADMIN — CYCLOBENZAPRINE 10 MG: 10 TABLET, FILM COATED ORAL at 21:45

## 2023-12-19 RX ADMIN — GABAPENTIN 800 MG: 400 CAPSULE ORAL at 10:19

## 2023-12-19 RX ADMIN — TOPIRAMATE 100 MG: 100 TABLET ORAL at 10:19

## 2023-12-19 RX ADMIN — CYCLOBENZAPRINE 10 MG: 10 TABLET, FILM COATED ORAL at 13:22

## 2023-12-19 RX ADMIN — METOPROLOL SUCCINATE 25 MG: 25 TABLET, EXTENDED RELEASE ORAL at 10:19

## 2023-12-19 RX ADMIN — DULOXETINE HYDROCHLORIDE 60 MG: 30 CAPSULE, DELAYED RELEASE ORAL at 10:19

## 2023-12-19 RX ADMIN — GABAPENTIN 800 MG: 400 CAPSULE ORAL at 16:26

## 2023-12-19 RX ADMIN — OXYCODONE HYDROCHLORIDE AND ACETAMINOPHEN 1 TABLET: 5; 325 TABLET ORAL at 10:19

## 2023-12-19 RX ADMIN — VENLAFAXINE HYDROCHLORIDE 300 MG: 75 CAPSULE, EXTENDED RELEASE ORAL at 10:19

## 2023-12-19 RX ADMIN — ENOXAPARIN SODIUM 40 MG: 40 INJECTION SUBCUTANEOUS at 21:45

## 2023-12-19 RX ADMIN — FAMOTIDINE 20 MG: 20 TABLET, FILM COATED ORAL at 10:19

## 2023-12-19 RX ADMIN — DIPHENHYDRAMINE HYDROCHLORIDE 25 MG: 25 CAPSULE ORAL at 10:19

## 2023-12-19 RX ADMIN — DOCUSATE SODIUM 100 MG: 100 CAPSULE, LIQUID FILLED ORAL at 10:19

## 2023-12-19 RX ADMIN — POTASSIUM CHLORIDE 40 MEQ: 1500 TABLET, EXTENDED RELEASE ORAL at 13:22

## 2023-12-19 RX ADMIN — TOPIRAMATE 100 MG: 100 TABLET ORAL at 21:46

## 2023-12-19 ASSESSMENT — COGNITIVE AND FUNCTIONAL STATUS - GENERAL
MOBILITY SCORE: 24
DAILY ACTIVITIY SCORE: 24

## 2023-12-19 ASSESSMENT — PAIN - FUNCTIONAL ASSESSMENT
PAIN_FUNCTIONAL_ASSESSMENT: 0-10

## 2023-12-19 ASSESSMENT — PAIN SCALES - GENERAL: PAINLEVEL_OUTOF10: 8

## 2023-12-19 NOTE — PROGRESS NOTES
Occupational Therapy                 Therapy Communication Note-  OT screen and discharge    Patient Name: Roby Qureshi  MRN: [id number]  Today's Date: 12/19/2023     Discipline: Occupational Therapy    Missed Visit Reason: Missed Visit Reason: Other (Comment) (OT Screen and D/C. Upon arrival, pt ambulating in room & hallway using rollator with Mod I. Pt donned/doffed socks using figure four technique at EOB at Mod I level. Pt does not demo or verbalize any acute OT related concerns. Will D/C order in system.)    Comment: TCC and RN aware.

## 2023-12-19 NOTE — PROGRESS NOTES
Roby Qureshi is a 78 y.o. female on day 1 of admission presenting with Chronic right-sided low back pain with right-sided sciatica.      Subjective   Patient was seen and examined at bedside this morning, stated that she slept well thank to use of ambien, and also tolerating pain with current regimen.     Objective     Last Recorded Vitals  /68   Pulse 61   Temp 36.4 °C (97.6 °F)   Resp 16   Wt 63 kg (139 lb)   SpO2 98%   Intake/Output last 3 Shifts:    Intake/Output Summary (Last 24 hours) at 12/18/2023 2305  Last data filed at 12/18/2023 1827  Gross per 24 hour   Intake 660 ml   Output 1 ml   Net 659 ml       Admission Weight  Weight: 63 kg (139 lb) (12/17/23 1444)    Daily Weight  12/17/23 : 63 kg (139 lb)    Image Results  CT angio chest abdomen pelvis  Narrative: Interpreted By:  Carly Andrade,   STUDY:  CT ANGIO CHEST ABDOMEN PELVIS; ;  12/16/2023 3:09 pm      INDICATION:  Signs/Symptoms:Low back and abdominal pain.  Rule out dissection.      COMPARISON:  CT abdomen pelvis 11/14/2023      ACCESSION NUMBER(S):  [id number]      ORDERING CLINICIAN:  ATA ALVARES      TECHNIQUE:  Imaging was performed from thoracic apex through ischial tuberosities  prior to intravenous contrast and with intravenous administration of  90 mL Omnipaque 350, timed for arterial opacification. Multi planer  reconstructions were made.      FINDINGS:  Streak artifact is present at the base of the cervical spine and  thoracolumbar junction through the SI joints related to fixation  hardware in the spine.      There is no aneurysmal dilatation of the aorta. The aorta is  tortuous. There is mild patchy atherosclerotic calcification. There  is no displaced intimal calcification identified.      Postcontrast the aorta opacifies homogeneously with no evidence of  dissection. Right innominate artery, left common carotid artery and  left subclavian artery are widely patent and arise in typical fashion  from the aortic  arch. Celiac artery, superior mesenteric artery,  bilateral single renal arteries and inferior mesenteric artery are  all patent.      Trachea and mainstem bronchi are patent. No endoluminal mass is  identified. Lungs are clear. No pleural effusion is present.      Thyroid gland is grossly normal in appearance. There are no  pathologically enlarged mediastinal or hilar lymph nodes. Esophagus  appears grossly normal.      Main pulmonary artery is normal in size. Heart is normal in size.  There is no coronary calcification. There is no pericardial effusion.      There is peripheral enhancement postcontrast in the patient's right  hepatic hemangioma which measures about 5.3 cm maximum diameter on  axial images and is not significantly changed. There is no intra or  extrahepatic biliary dilatation. Gallbladder contains at least 1  stone which has peripheral calcification and measures 1.7 cm.      No mass or inflammation is noted involving the pancreas.      Spleen is normal in size with no focal mass noted.      Adrenal glands appear normal. Kidneys enhance symmetrically with no  hydronephrosis noted. 1.4 cm mass lateral mid left kidney is  unchanged, with internal density of 40 Hounsfield units, not  compatible with a simple cyst.      Bowel loops are nondilated. Appendix is not identified. No ascites,  pneumoperitoneum or mesenteric inflammation is identified.      Aorta and vena cava are normal in size. There are no pathologically  enlarged retroperitoneal, iliac or inguinal lymph nodes identified.      Urinary bladder is normal in appearance with no wall thickening.      Prostate gland measures 2.8 x 2.6 x 4.5 cm.      No abdominal wall hernia is identified.      Diffuse severe disc and endplate degenerative changes are present.  There are pedicle screws with posterior fixation rods extending  between T10 and S1 as well as screws across the sacroiliac joints  bilaterally. The lower portion of cervical fixation  hardware is also  noted. No acute osseous abnormality is identified.      Impression: There is no dissection or aneurysm of the aorta      Unchanged hemangioma right hepatic lobe      1.4 cm mass of the left kidney is unchanged and has density which is  incompatible with a simple cyst. Further evaluation with MRI is  recommended to determine if this is a complicated cyst or a tumor.          MACRO:  None.      Signed by: Carly Andrade 12/16/2023 3:40 PM  Dictation workstation:   [id number]      Physical Exam  Constitutional: Elderly female, fixating on her current regimen, alert active, cooperative not in acute distress  Eyes: PERRLA, clear sclera  ENMT: Moist mucosal membranes, no exudate  Head / Neck: Atraumatic, normocephalic, supple neck, JVP not visualized  Lungs: Patent airways, CTABL  Heart: RRR, S1S2, no murmurs appreciated, palpable pulses in all extremities  GI: Soft, NT, ND, bowel sounds present in all quadrants  MSK: Moves all extremities freely, lumbar spine rotation, extension flexion and sidebending with tenderness in ROM, no joint edema  Extremities: Intact x 4, no peripheral edema  : No Crouch catheter inserted  Breast: Deferred  Neurological: AAO x 3 to person, place and date, facial muscles symmetrical, sensation intact, strength 4/4, no acute focal neurological deficits appreciated  Psychological: Appropriate mood and behavior     Relevant Results               Assessment/Plan        78 y.o. female with past medical history of lumbar pain, status post laminectomy, radiculopathy of the lower extremities, presenting with complaint of excruciating back pain.        Principal Problem:    Chronic right-sided low back pain with right-sided sciatica      Chronic back pain: Presenting with excruciating sciatica pain  -History of multiple surgeries and epidural injections  -States following the surgeon for spinal nerve stimulation  -Dilaudid 0.6 mg IV push every 4 hours as needed severe  pain  -Percocet 5-325 mg 1 tablet every 6 hours as needed breakthrough pain  -Cyclobenzaprine 10 mg 3 times daily as needed muscle spasm  -Continue duloxetine 60 mg daily  -Gabapentin 800 mg daily  -Pain management consulted: Impression pending  -PT OT: Recommend low intensity level of continue care, and rollator at discharge     History of arrhythmia  -On metoprolol 25 mg daily     Depression  -Venlafaxine  mg daily  -Topiramate 100 mg daily, also off label use for pain management     Insomnia  -Zolpidem 10 mg at bedtime     Diet  -Regular     DVT prophylaxis  -Lovenox 40 mg subcu daily     CODE STATUS: Full     Disposition: Presenting with chronic low back pain worsening psychiatrical, need further evaluation and management, discharge pending pain management evaluation and recommendation.  Unlikely to meet SNF admission criteria, will discharge with home health and physical therapy at home     I spent 40 minutes in the professional and overall care of this patient.        Bandar Soliman, DO

## 2023-12-19 NOTE — CONSULTS
CHRONIC PAIN CONSULT NOTE       SERVICE DATE: 12/19/2023    Subjective   HISTORY OF PRESENT ILLNESS: Roby Qureshi is a 78 y.o. female with history of chronic low back pain s/p complex spine surgeries including L2-sacrum instrumented fusion and L5-S1 ALIF with reviison [id number] instrumented fusion (2020) with Dr. Cardenas who presents for acute on chronic low back pain radiating into right leg/groin/rectum. Reports worsening pain over the last 3-4 weeks. She states that her chronic pain is similar to current pain, but she also has pain wrapping around her groin and rectum. She is a patient of pain management, but has no longer found efficacy from epidural injections. She is on multiple pain medications including flexeril, benadryl, duloxetine, gabapentin, prednisone, topamax, and percocet. She has found some relief with these medications, current pain is 8/10, but her pain is still uncontrolled. She is able to walk, but reports that the pain is affecting her ability to function and live. She states she is very constipated and would feel much better if this could be relieved.     CT scans during this admission show no acute changes without high-grade canal or foraminal stenosis.        Objective     Results for orders placed or performed during the hospital encounter of 12/17/23 (from the past 24 hour(s))   CBC   Result Value Ref Range    WBC 11.7 (H) 4.4 - 11.3 x10*3/uL    nRBC 0.0 0.0 - 0.0 /100 WBCs    RBC 4.25 4.00 - 5.20 x10*6/uL    Hemoglobin 12.6 12.0 - 16.0 g/dL    Hematocrit 40.9 36.0 - 46.0 %    MCV 96 80 - 100 fL    MCH 29.6 26.0 - 34.0 pg    MCHC 30.8 (L) 32.0 - 36.0 g/dL    RDW 15.9 (H) 11.5 - 14.5 %    Platelets 231 150 - 450 x10*3/uL   Basic Metabolic Panel   Result Value Ref Range    Glucose 90 74 - 99 mg/dL    Sodium 139 136 - 145 mmol/L    Potassium 3.2 (L) 3.5 - 5.3 mmol/L    Chloride 101 98 - 107 mmol/L    Bicarbonate 27 21 - 32 mmol/L    Anion Gap 14 10 - 20 mmol/L    Urea Nitrogen 12 6 - 23  mg/dL    Creatinine 0.53 0.50 - 1.05 mg/dL    eGFR >90 >60 mL/min/1.73m*2    Calcium 8.9 8.6 - 10.3 mg/dL     CT C/T/L Spine without contrast     As seen on prior CT similar appearance of the cervical, thoracic and lumbar spine to prior MRI with extensive postsurgical changes. Demineralization. Scattered degenerative changes. No significant change when compared to prior.. If concern for new pathology, further evaluation is advised with follow-up MRI   MACRO: None   Signed by: Vishal Wong 12/14/2023 11:00 PM Dictation workstation:   [id number]       PHYSICAL EXAMINATION  Vital Signs:   Vital signs reviewed  Vitals:    12/19/23 0752   BP: 146/85   Pulse: 50   Resp: 16   Temp: 35.7 °C (96.2 °F)   SpO2: 97%     Pain Score: 8     Physical Exam  Exam  Constitutional: Awake, alert, well appearing, well developed. No acute distress, Patient appears stated age.  Ears, Nose, Mouth, and Throat: External ears and nose appear to be without deformity or rash. No lesions or masses noted. Hearing is grossly intact.  Head and Face: Examination of the head and face revealed no abnormalities.  Eyes: Conjunctiva non-icteric and eye lids are without obvious rash or drooping. Pupils are symmetric.  Neck: Normal appearing without swelling, tracheal position is midline.  Respiratory: No gasping or shortness of breath noted. Normal respiratory movements without use of accessory muscles.   Cardiovascular: No peripheral edema present.   Skin: No rashes or open lesions/ulcers identified on skin.  Psychiatric: Mood and affect are normal.    MSK:  No asymmetry or masses noted of the musculature.  Examination of the muscles/joints/bones show normal range of motion.  The patient is nontender to palpation along the facet joints. +ttp of right, paraspinal muscles and SI joint      Neurologic:    Motor strength:  5/5 muscle strength of the lower extremities bilaterally and equal. Pain limited weakness with bilateral hip  flexion.    Sensation:  Sensation intact to light touch in the bilateral lower extremities.    Negative SLR (seated and supine) bilaterally     ASSESSMENT/PLAN    Roby Qureshi is a 78 y.o. female with history of chronic low back pain s/p complex spine surgeries including L2-sacrum instrumented fusion and L5-S1 ALIF with reviison [id number] instrumented fusion (2020) with Dr. Cardenas who is admitted with acute on chronic back pain radiating into right groin and leg, consistent with post-laminectomy syndrome. She is a pain patient of Dr. Smith, but has no longer found benefit from epidural injections. She is a candidate for spinal cord stimulation which would be the most potentially beneficial treatment for postlaminectomy syndrome. Needs neurosurgical follow-up for this due to complex spine history.     Plan   Recommend outpatient neurosurgical follow-up for spinal cord stimulation staged paddle lead trial/implant.     Recommend aggressive laxative and suppository to assist patient's constipation.    Discharge med recommendation:   Discontinue cyclobenzaprine and benadryl.   Continue duloxetine 60mg daily   Increase gabapentin to 1200 TID   Continue prednisone 40mg for three more days.   Continue topamax 100mg BID   May benefit from a short course of Percocet 5-325 on discharge. These will not be refilled by pain management.   Patient understands that medications will only help so much for her pain and that spinal cord stimulation is the best next option.     Thank you for asking Chronic Pain to assist with care of this patient.   Please contact us for additional questions or concerns.      SIGNATURE: Fouzia Fernandez DO   Chronic Pain Fellow

## 2023-12-19 NOTE — PROGRESS NOTES
Roby Qureshi is a 78 y.o. female on day 1 of admission presenting with Chronic right-sided low back pain with right-sided sciatica.      Subjective   Patient was seen and examined at bedside this morning, stated that she slept well thank to use of ambien, and also tolerating pain with current regimen.     Objective     Last Recorded Vitals  /85   Pulse 50   Temp 35.7 °C (96.2 °F) (Temporal)   Resp 16   Wt 63 kg (139 lb)   SpO2 97%   Intake/Output last 3 Shifts:    Intake/Output Summary (Last 24 hours) at 12/19/2023 1233  Last data filed at 12/19/2023 0900  Gross per 24 hour   Intake 720 ml   Output 200 ml   Net 520 ml         Admission Weight  Weight: 63 kg (139 lb) (12/17/23 1444)    Daily Weight  12/17/23 : 63 kg (139 lb)    Image Results  CT angio chest abdomen pelvis  Narrative: Interpreted By:  Carly Andrade,   STUDY:  CT ANGIO CHEST ABDOMEN PELVIS; ;  12/16/2023 3:09 pm      INDICATION:  Signs/Symptoms:Low back and abdominal pain.  Rule out dissection.      COMPARISON:  CT abdomen pelvis 11/14/2023      ACCESSION NUMBER(S):  [id number]      ORDERING CLINICIAN:  ATA ALVARES      TECHNIQUE:  Imaging was performed from thoracic apex through ischial tuberosities  prior to intravenous contrast and with intravenous administration of  90 mL Omnipaque 350, timed for arterial opacification. Multi planer  reconstructions were made.      FINDINGS:  Streak artifact is present at the base of the cervical spine and  thoracolumbar junction through the SI joints related to fixation  hardware in the spine.      There is no aneurysmal dilatation of the aorta. The aorta is  tortuous. There is mild patchy atherosclerotic calcification. There  is no displaced intimal calcification identified.      Postcontrast the aorta opacifies homogeneously with no evidence of  dissection. Right innominate artery, left common carotid artery and  left subclavian artery are widely patent and arise in typical  fashion  from the aortic arch. Celiac artery, superior mesenteric artery,  bilateral single renal arteries and inferior mesenteric artery are  all patent.      Trachea and mainstem bronchi are patent. No endoluminal mass is  identified. Lungs are clear. No pleural effusion is present.      Thyroid gland is grossly normal in appearance. There are no  pathologically enlarged mediastinal or hilar lymph nodes. Esophagus  appears grossly normal.      Main pulmonary artery is normal in size. Heart is normal in size.  There is no coronary calcification. There is no pericardial effusion.      There is peripheral enhancement postcontrast in the patient's right  hepatic hemangioma which measures about 5.3 cm maximum diameter on  axial images and is not significantly changed. There is no intra or  extrahepatic biliary dilatation. Gallbladder contains at least 1  stone which has peripheral calcification and measures 1.7 cm.      No mass or inflammation is noted involving the pancreas.      Spleen is normal in size with no focal mass noted.      Adrenal glands appear normal. Kidneys enhance symmetrically with no  hydronephrosis noted. 1.4 cm mass lateral mid left kidney is  unchanged, with internal density of 40 Hounsfield units, not  compatible with a simple cyst.      Bowel loops are nondilated. Appendix is not identified. No ascites,  pneumoperitoneum or mesenteric inflammation is identified.      Aorta and vena cava are normal in size. There are no pathologically  enlarged retroperitoneal, iliac or inguinal lymph nodes identified.      Urinary bladder is normal in appearance with no wall thickening.      Prostate gland measures 2.8 x 2.6 x 4.5 cm.      No abdominal wall hernia is identified.      Diffuse severe disc and endplate degenerative changes are present.  There are pedicle screws with posterior fixation rods extending  between T10 and S1 as well as screws across the sacroiliac joints  bilaterally. The lower portion  of cervical fixation hardware is also  noted. No acute osseous abnormality is identified.      Impression: There is no dissection or aneurysm of the aorta      Unchanged hemangioma right hepatic lobe      1.4 cm mass of the left kidney is unchanged and has density which is  incompatible with a simple cyst. Further evaluation with MRI is  recommended to determine if this is a complicated cyst or a tumor.          MACRO:  None.      Signed by: Carly Andrade 12/16/2023 3:40 PM  Dictation workstation:   [id number]      Physical Exam  Constitutional: Elderly female, fixating on her current regimen, alert active, cooperative not in acute distress  Eyes: PERRLA, clear sclera  ENMT: Moist mucosal membranes, no exudate  Head / Neck: Atraumatic, normocephalic, supple neck, JVP not visualized  Lungs: Patent airways, CTABL  Heart: RRR, S1S2, no murmurs appreciated, palpable pulses in all extremities  GI: Soft, NT, ND, bowel sounds present in all quadrants  MSK: Moves all extremities freely, lumbar spine rotation, extension flexion and sidebending with tenderness in ROM, no joint edema  Extremities: Intact x 4, no peripheral edema  : No Crouch catheter inserted  Breast: Deferred  Neurological: AAO x 3 to person, place and date, facial muscles symmetrical, sensation intact, strength 4/4, no acute focal neurological deficits appreciated  Psychological: Appropriate mood and behavior     Relevant Results               Assessment/Plan        78 y.o. female with past medical history of lumbar pain, status post laminectomy, radiculopathy of the lower extremities, presenting with complaint of excruciating back pain.        Principal Problem:    Chronic right-sided low back pain with right-sided sciatica      Chronic back pain: Presenting with excruciating sciatica pain  -History of multiple surgeries and epidural injections  -States following the surgeon for spinal nerve stimulation  -Dilaudid 0.6 mg IV push every 4 hours as needed  severe pain  -Percocet 5-325 mg 1 tablet every 6 hours as needed breakthrough pain  -Cyclobenzaprine 10 mg 3 times daily as needed muscle spasm  -Continue duloxetine 60 mg daily  -Gabapentin 800 mg daily  -Pain management consulted: Impression pending  -PT OT: Recommend low intensity level of continue care, and rollator at discharge  -pain management consult     History of arrhythmia  -On metoprolol 25 mg daily     Depression  -Venlafaxine  mg daily  -Topiramate 100 mg daily, also off label use for pain management     Insomnia  -Zolpidem 10 mg at bedtime     Diet  -Regular     DVT prophylaxis  -Lovenox 40 mg subcu daily     CODE STATUS: Full     Disposition: Presenting with chronic low back pain worsening psychiatrical, need further evaluation and management, discharge pending pain management evaluation and recommendation.  Unlikely to meet SNF admission criteria, will discharge with home health and physical therapy at home           Destiny Schilling MD

## 2023-12-20 ENCOUNTER — PHARMACY VISIT (OUTPATIENT)
Dept: PHARMACY | Facility: CLINIC | Age: 78
End: 2023-12-20
Payer: MEDICARE

## 2023-12-20 VITALS
WEIGHT: 139 LBS | RESPIRATION RATE: 18 BRPM | OXYGEN SATURATION: 98 % | HEIGHT: 65 IN | DIASTOLIC BLOOD PRESSURE: 89 MMHG | SYSTOLIC BLOOD PRESSURE: 139 MMHG | HEART RATE: 61 BPM | TEMPERATURE: 96.4 F | BODY MASS INDEX: 23.16 KG/M2

## 2023-12-20 LAB
ANION GAP SERPL CALC-SCNC: 14 MMOL/L (ref 10–20)
BUN SERPL-MCNC: 12 MG/DL (ref 6–23)
CALCIUM SERPL-MCNC: 8.7 MG/DL (ref 8.6–10.3)
CHLORIDE SERPL-SCNC: 101 MMOL/L (ref 98–107)
CO2 SERPL-SCNC: 29 MMOL/L (ref 21–32)
CREAT SERPL-MCNC: 0.64 MG/DL (ref 0.5–1.05)
ERYTHROCYTE [DISTWIDTH] IN BLOOD BY AUTOMATED COUNT: 15.6 % (ref 11.5–14.5)
GFR SERPL CREATININE-BSD FRML MDRD: >90 ML/MIN/1.73M*2
GLUCOSE SERPL-MCNC: 89 MG/DL (ref 74–99)
HCT VFR BLD AUTO: 38.6 % (ref 36–46)
HGB BLD-MCNC: 12.5 G/DL (ref 12–16)
MCH RBC QN AUTO: 29.9 PG (ref 26–34)
MCHC RBC AUTO-ENTMCNC: 32.4 G/DL (ref 32–36)
MCV RBC AUTO: 92 FL (ref 80–100)
NRBC BLD-RTO: 0 /100 WBCS (ref 0–0)
PLATELET # BLD AUTO: 265 X10*3/UL (ref 150–450)
POTASSIUM SERPL-SCNC: 3.6 MMOL/L (ref 3.5–5.3)
RBC # BLD AUTO: 4.18 X10*6/UL (ref 4–5.2)
SODIUM SERPL-SCNC: 140 MMOL/L (ref 136–145)
WBC # BLD AUTO: 12.9 X10*3/UL (ref 4.4–11.3)

## 2023-12-20 PROCEDURE — 2500000001 HC RX 250 WO HCPCS SELF ADMINISTERED DRUGS (ALT 637 FOR MEDICARE OP): Performed by: INTERNAL MEDICINE

## 2023-12-20 PROCEDURE — 99238 HOSP IP/OBS DSCHRG MGMT 30/<: CPT | Performed by: INTERNAL MEDICINE

## 2023-12-20 PROCEDURE — 80048 BASIC METABOLIC PNL TOTAL CA: CPT | Performed by: INTERNAL MEDICINE

## 2023-12-20 PROCEDURE — RXMED WILLOW AMBULATORY MEDICATION CHARGE

## 2023-12-20 PROCEDURE — 2500000004 HC RX 250 GENERAL PHARMACY W/ HCPCS (ALT 636 FOR OP/ED): Mod: MUE | Performed by: INTERNAL MEDICINE

## 2023-12-20 PROCEDURE — 85027 COMPLETE CBC AUTOMATED: CPT | Performed by: INTERNAL MEDICINE

## 2023-12-20 PROCEDURE — G0378 HOSPITAL OBSERVATION PER HR: HCPCS

## 2023-12-20 PROCEDURE — 36415 COLL VENOUS BLD VENIPUNCTURE: CPT | Performed by: INTERNAL MEDICINE

## 2023-12-20 RX ORDER — OXYCODONE AND ACETAMINOPHEN 5; 325 MG/1; MG/1
1 TABLET ORAL EVERY 6 HOURS PRN
Qty: 15 TABLET | Refills: 0 | OUTPATIENT
Start: 2023-12-20 | End: 2023-12-26

## 2023-12-20 RX ORDER — ZOLPIDEM TARTRATE 5 MG/1
5 TABLET ORAL NIGHTLY PRN
Qty: 7 TABLET | Refills: 0 | Status: SHIPPED | OUTPATIENT
Start: 2023-12-20 | End: 2024-01-14

## 2023-12-20 RX ORDER — GABAPENTIN 400 MG/1
1200 CAPSULE ORAL 3 TIMES DAILY
Qty: 270 CAPSULE | Refills: 0 | Status: SHIPPED | OUTPATIENT
Start: 2023-12-20 | End: 2024-01-14 | Stop reason: ENTERED-IN-ERROR

## 2023-12-20 RX ORDER — BISACODYL 10 MG/1
10 SUPPOSITORY RECTAL DAILY
Qty: 10 SUPPOSITORY | Refills: 0 | Status: ON HOLD | OUTPATIENT
Start: 2023-12-21 | End: 2023-12-29 | Stop reason: ENTERED-IN-ERROR

## 2023-12-20 RX ORDER — PREDNISONE 20 MG/1
40 TABLET ORAL DAILY
Qty: 4 TABLET | Refills: 0 | Status: SHIPPED | OUTPATIENT
Start: 2023-12-20 | End: 2023-12-30 | Stop reason: HOSPADM

## 2023-12-20 RX ADMIN — BISACODYL 10 MG: 10 SUPPOSITORY RECTAL at 09:11

## 2023-12-20 RX ADMIN — FAMOTIDINE 20 MG: 20 TABLET, FILM COATED ORAL at 09:11

## 2023-12-20 RX ADMIN — DIPHENHYDRAMINE HYDROCHLORIDE 25 MG: 25 CAPSULE ORAL at 09:11

## 2023-12-20 RX ADMIN — DOCUSATE SODIUM 100 MG: 100 CAPSULE, LIQUID FILLED ORAL at 09:11

## 2023-12-20 RX ADMIN — GABAPENTIN 800 MG: 400 CAPSULE ORAL at 09:11

## 2023-12-20 RX ADMIN — TOPIRAMATE 100 MG: 100 TABLET ORAL at 09:00

## 2023-12-20 RX ADMIN — OXYCODONE HYDROCHLORIDE AND ACETAMINOPHEN 1 TABLET: 5; 325 TABLET ORAL at 13:45

## 2023-12-20 RX ADMIN — METOPROLOL SUCCINATE 25 MG: 25 TABLET, EXTENDED RELEASE ORAL at 09:00

## 2023-12-20 RX ADMIN — DIPHENHYDRAMINE HYDROCHLORIDE 25 MG: 25 CAPSULE ORAL at 13:55

## 2023-12-20 RX ADMIN — VENLAFAXINE HYDROCHLORIDE 300 MG: 75 CAPSULE, EXTENDED RELEASE ORAL at 09:11

## 2023-12-20 RX ADMIN — OXYCODONE HYDROCHLORIDE AND ACETAMINOPHEN 1 TABLET: 5; 325 TABLET ORAL at 09:11

## 2023-12-20 RX ADMIN — CYCLOBENZAPRINE 10 MG: 10 TABLET, FILM COATED ORAL at 12:20

## 2023-12-20 RX ADMIN — DULOXETINE HYDROCHLORIDE 60 MG: 30 CAPSULE, DELAYED RELEASE ORAL at 09:11

## 2023-12-20 RX ADMIN — PREDNISONE 40 MG: 20 TABLET ORAL at 09:11

## 2023-12-20 RX ADMIN — CALCIUM POLYCARBOPHIL 625 MG: 625 TABLET ORAL at 09:11

## 2023-12-20 ASSESSMENT — PAIN SCALES - GENERAL: PAINLEVEL_OUTOF10: 7

## 2023-12-20 NOTE — DISCHARGE SUMMARY
Discharge Diagnosis  Chronic right-sided low back pain with right-sided sciatica    Issues Requiring Follow-Up  PCP and neurosurgery follow up in 2 weeks    Discharge Meds     Your medication list        START taking these medications        Instructions Last Dose Given Next Dose Due   bisacodyl 10 mg suppository  Commonly known as: Dulcolax  Start taking on: December 21, 2023      Insert 1 suppository (10 mg) into the rectum once daily for 10 days. Do not start before December 21, 2023.       gabapentin 400 mg capsule  Commonly known as: Neurontin  Replaces: gabapentin 800 mg tablet      Take 3 capsules (1,200 mg) by mouth 3 times a day.              CHANGE how you take these medications        Instructions Last Dose Given Next Dose Due   DULoxetine 60 mg DR capsule  Commonly known as: Cymbalta  What changed: Another medication with the same name was removed. Continue taking this medication, and follow the directions you see here.                  CONTINUE taking these medications        Instructions Last Dose Given Next Dose Due   chlorzoxazone 500 mg tablet  Commonly known as: Parafon Forte           CitruceL 500 mg tablet  Generic drug: methylcellulose (laxative)           docusate sodium 100 mg capsule  Commonly known as: Colace      Take 1 capsule (100 mg) by mouth every 12 hours.       hydrOXYzine HCL 50 mg tablet  Commonly known as: Atarax           hydrOXYzine HCL 50 mg tablet  Commonly known as: Atarax           hydrOXYzine pamoate 25 mg capsule  Commonly known as: Vistaril           ibuprofen 400 mg tablet           metoprolol succinate XL 25 mg 24 hr tablet  Commonly known as: Toprol-XL           oxyCODONE-acetaminophen 5-325 mg tablet  Commonly known as: Percocet      Take 1 tablet by mouth every 6 hours if needed for severe pain (7 - 10).       predniSONE 20 mg tablet  Commonly known as: Deltasone      Take 2 tablets (40 mg) by mouth once daily for 2 days.       PROBIOTIC ORAL           tiZANidine 4 mg  tablet  Commonly known as: Zanaflex           topiramate 100 mg tablet  Commonly known as: Topamax      Take 1 tablet (100 mg) by mouth 2 times a day.       venlafaxine 150 mg 24 hr tablet           zolpidem 10 mg tablet  Commonly known as: Ambien                  STOP taking these medications      Banophen 25 mg capsule  Generic drug: diphenhydrAMINE        cyclobenzaprine 10 mg tablet  Commonly known as: Flexeril        diphenhydrAMINE 25 mg tablet  Commonly known as: Sominex        gabapentin 800 mg tablet  Commonly known as: Neurontin  Replaced by: gabapentin 400 mg capsule        traMADol 50 mg tablet  Commonly known as: Ultram                  Where to Get Your Medications        These medications were sent to [facility]  [address], [facility] [address]      Hours: 8 AM to 6 PM Mon-Fri, 9 AM to 1 PM Saturday Phone: [phone]   bisacodyl 10 mg suppository  gabapentin 400 mg capsule  oxyCODONE-acetaminophen 5-325 mg tablet  predniSONE 20 mg tablet         Test Results Pending At Discharge  Pending Labs       No current pending labs.            Hospital Course     78 y.o. female with past medical history of lumbar pain, status post laminectomy, radiculopathy of the lower extremities, presenting with complaint of excruciating back pain.         Principal Problem:    Chronic right-sided low back pain with right-sided sciatica        Chronic back pain: Presenting with excruciating sciatica pain  -History of multiple surgeries and epidural injections  -States following the surgeon for spinal nerve stimulation  -Dilaudid 0.6 mg IV push every 4 hours as needed severe pain  -Percocet 5-325 mg 1 tablet every 6 hours as needed breakthrough pain  -Cyclobenzaprine 10 mg 3 times daily as needed muscle spasm  -Continue duloxetine 60 mg daily  -Gabapentin 800 mg daily  -Pain management consulted: Impression pending  -PT OT: Recommend low intensity level of continue care, and rollator at  discharge  -pain management consult-    Discontinue cyclobenzaprine and benadryl.     Continue duloxetine 60mg daily     Increase gabapentin to 1200 TID     Continue prednisone 40mg for three more days.     Continue topamax 100mg BID     May benefit from a short course of Percocet 5-325 on discharge.      History of arrhythmia  -On metoprolol 25 mg daily     Depression  -Venlafaxine  mg daily  -Topiramate 100 mg daily, also off label use for pain management     Insomnia  -Zolpidem 10 mg at bedtime      Patient is feeling better today.  She will be discharged home with medications as recommended by the pain management team.  Advised her to follow-up with her PCP and neurosurgery as outpatient.  Referral for neurosurgery has been created.    Pertinent Physical Exam At Time of Discharge  Physical Exam    Constitutional: No acute distress, awake, alert  Head/Neck: Neck supple  Respiratory/Thorax: Lungs are Clear to auscultation  Cardiovascular: Regular, rate and rhythm,  2+ equal pulses of the extremities, normal S 1and S 2  Gastrointestinal: Nondistended, soft, non-tender, no rebound tenderness or guarding  Extremities: No edema. No calf tenderness.  Neurological: Awake and alert. No focal neurological deficits  Psychological: Appropriate mood and behavior    Outpatient Follow-Up  No future appointments.      Destiny Schilling MD

## 2023-12-25 DIAGNOSIS — M48.02 SPINAL STENOSIS OF CERVICAL REGION: ICD-10-CM

## 2023-12-26 ENCOUNTER — HOSPITAL ENCOUNTER (EMERGENCY)
Facility: HOSPITAL | Age: 78
Discharge: HOME | End: 2023-12-26
Attending: EMERGENCY MEDICINE
Payer: COMMERCIAL

## 2023-12-26 VITALS
SYSTOLIC BLOOD PRESSURE: 138 MMHG | OXYGEN SATURATION: 99 % | BODY MASS INDEX: 25.58 KG/M2 | TEMPERATURE: 98 F | HEIGHT: 62 IN | HEART RATE: 82 BPM | DIASTOLIC BLOOD PRESSURE: 84 MMHG | WEIGHT: 139 LBS | RESPIRATION RATE: 16 BRPM

## 2023-12-26 DIAGNOSIS — M54.41 ACUTE RIGHT-SIDED LOW BACK PAIN WITH RIGHT-SIDED SCIATICA: Primary | ICD-10-CM

## 2023-12-26 DIAGNOSIS — G89.29 CHRONIC BACK PAIN, UNSPECIFIED BACK LOCATION, UNSPECIFIED BACK PAIN LATERALITY: ICD-10-CM

## 2023-12-26 DIAGNOSIS — M54.9 CHRONIC BACK PAIN, UNSPECIFIED BACK LOCATION, UNSPECIFIED BACK PAIN LATERALITY: ICD-10-CM

## 2023-12-26 LAB
ALBUMIN SERPL BCP-MCNC: 3.8 G/DL (ref 3.4–5)
ALP SERPL-CCNC: 69 U/L (ref 33–136)
ALT SERPL W P-5'-P-CCNC: 11 U/L (ref 7–45)
ANION GAP SERPL CALC-SCNC: 14 MMOL/L (ref 10–20)
APTT PPP: 45 SECONDS (ref 27–38)
AST SERPL W P-5'-P-CCNC: 13 U/L (ref 9–39)
BASOPHILS # BLD AUTO: 0.05 X10*3/UL (ref 0–0.1)
BASOPHILS NFR BLD AUTO: 0.5 %
BILIRUB SERPL-MCNC: 0.7 MG/DL (ref 0–1.2)
BUN SERPL-MCNC: 8 MG/DL (ref 6–23)
CALCIUM SERPL-MCNC: 9.3 MG/DL (ref 8.6–10.3)
CARDIAC TROPONIN I PNL SERPL HS: 8 NG/L (ref 0–13)
CHLORIDE SERPL-SCNC: 103 MMOL/L (ref 98–107)
CO2 SERPL-SCNC: 28 MMOL/L (ref 21–32)
CREAT SERPL-MCNC: 0.48 MG/DL (ref 0.5–1.05)
EOSINOPHIL # BLD AUTO: 0.18 X10*3/UL (ref 0–0.4)
EOSINOPHIL NFR BLD AUTO: 1.8 %
ERYTHROCYTE [DISTWIDTH] IN BLOOD BY AUTOMATED COUNT: 15.3 % (ref 11.5–14.5)
GFR SERPL CREATININE-BSD FRML MDRD: >90 ML/MIN/1.73M*2
GLUCOSE SERPL-MCNC: 91 MG/DL (ref 74–99)
HCT VFR BLD AUTO: 41.3 % (ref 36–46)
HGB BLD-MCNC: 13.4 G/DL (ref 12–16)
IMM GRANULOCYTES # BLD AUTO: 0.05 X10*3/UL (ref 0–0.5)
IMM GRANULOCYTES NFR BLD AUTO: 0.5 % (ref 0–0.9)
INR PPP: 1 (ref 0.9–1.1)
LYMPHOCYTES # BLD AUTO: 3.69 X10*3/UL (ref 0.8–3)
LYMPHOCYTES NFR BLD AUTO: 36.8 %
MCH RBC QN AUTO: 29.5 PG (ref 26–34)
MCHC RBC AUTO-ENTMCNC: 32.4 G/DL (ref 32–36)
MCV RBC AUTO: 91 FL (ref 80–100)
MONOCYTES # BLD AUTO: 0.92 X10*3/UL (ref 0.05–0.8)
MONOCYTES NFR BLD AUTO: 9.2 %
NEUTROPHILS # BLD AUTO: 5.13 X10*3/UL (ref 1.6–5.5)
NEUTROPHILS NFR BLD AUTO: 51.2 %
NRBC BLD-RTO: 0 /100 WBCS (ref 0–0)
PLATELET # BLD AUTO: 332 X10*3/UL (ref 150–450)
POTASSIUM SERPL-SCNC: 3.7 MMOL/L (ref 3.5–5.3)
PROT SERPL-MCNC: 6.6 G/DL (ref 6.4–8.2)
PROTHROMBIN TIME: 11.3 SECONDS (ref 9.8–12.8)
RBC # BLD AUTO: 4.54 X10*6/UL (ref 4–5.2)
SODIUM SERPL-SCNC: 141 MMOL/L (ref 136–145)
WBC # BLD AUTO: 10 X10*3/UL (ref 4.4–11.3)

## 2023-12-26 PROCEDURE — 99284 EMERGENCY DEPT VISIT MOD MDM: CPT | Performed by: EMERGENCY MEDICINE

## 2023-12-26 PROCEDURE — 80053 COMPREHEN METABOLIC PANEL: CPT | Performed by: EMERGENCY MEDICINE

## 2023-12-26 PROCEDURE — 2500000001 HC RX 250 WO HCPCS SELF ADMINISTERED DRUGS (ALT 637 FOR MEDICARE OP): Performed by: EMERGENCY MEDICINE

## 2023-12-26 PROCEDURE — 85025 COMPLETE CBC W/AUTO DIFF WBC: CPT | Performed by: EMERGENCY MEDICINE

## 2023-12-26 PROCEDURE — 85730 THROMBOPLASTIN TIME PARTIAL: CPT | Performed by: EMERGENCY MEDICINE

## 2023-12-26 PROCEDURE — 84484 ASSAY OF TROPONIN QUANT: CPT | Performed by: EMERGENCY MEDICINE

## 2023-12-26 PROCEDURE — 96374 THER/PROPH/DIAG INJ IV PUSH: CPT | Performed by: EMERGENCY MEDICINE

## 2023-12-26 PROCEDURE — 36415 COLL VENOUS BLD VENIPUNCTURE: CPT | Performed by: EMERGENCY MEDICINE

## 2023-12-26 PROCEDURE — 85610 PROTHROMBIN TIME: CPT | Performed by: EMERGENCY MEDICINE

## 2023-12-26 PROCEDURE — 2500000004 HC RX 250 GENERAL PHARMACY W/ HCPCS (ALT 636 FOR OP/ED): Performed by: EMERGENCY MEDICINE

## 2023-12-26 RX ORDER — OXYCODONE AND ACETAMINOPHEN 5; 325 MG/1; MG/1
1 TABLET ORAL ONCE
Status: COMPLETED | OUTPATIENT
Start: 2023-12-26 | End: 2023-12-26

## 2023-12-26 RX ORDER — OXYCODONE AND ACETAMINOPHEN 5; 325 MG/1; MG/1
1 TABLET ORAL EVERY 6 HOURS PRN
Qty: 15 TABLET | Refills: 0 | Status: SHIPPED | OUTPATIENT
Start: 2023-12-26 | End: 2024-01-14 | Stop reason: ENTERED-IN-ERROR

## 2023-12-26 RX ORDER — HYDROMORPHONE HYDROCHLORIDE 1 MG/ML
0.6 INJECTION, SOLUTION INTRAMUSCULAR; INTRAVENOUS; SUBCUTANEOUS ONCE
Status: COMPLETED | OUTPATIENT
Start: 2023-12-26 | End: 2023-12-26

## 2023-12-26 RX ORDER — OXYCODONE AND ACETAMINOPHEN 5; 325 MG/1; MG/1
1 TABLET ORAL EVERY 6 HOURS PRN
Qty: 15 TABLET | Refills: 0 | Status: SHIPPED | OUTPATIENT
Start: 2023-12-26 | End: 2023-12-26 | Stop reason: SDUPTHER

## 2023-12-26 RX ADMIN — HYDROMORPHONE HYDROCHLORIDE 0.6 MG: 1 INJECTION, SOLUTION INTRAMUSCULAR; INTRAVENOUS; SUBCUTANEOUS at 14:50

## 2023-12-26 RX ADMIN — OXYCODONE HYDROCHLORIDE AND ACETAMINOPHEN 1 TABLET: 5; 325 TABLET ORAL at 14:49

## 2023-12-26 ASSESSMENT — PAIN SCALES - GENERAL
PAINLEVEL_OUTOF10: 10 - WORST POSSIBLE PAIN
PAINLEVEL_OUTOF10: 8

## 2023-12-26 ASSESSMENT — COLUMBIA-SUICIDE SEVERITY RATING SCALE - C-SSRS
1. IN THE PAST MONTH, HAVE YOU WISHED YOU WERE DEAD OR WISHED YOU COULD GO TO SLEEP AND NOT WAKE UP?: NO
6. HAVE YOU EVER DONE ANYTHING, STARTED TO DO ANYTHING, OR PREPARED TO DO ANYTHING TO END YOUR LIFE?: NO
2. HAVE YOU ACTUALLY HAD ANY THOUGHTS OF KILLING YOURSELF?: NO

## 2023-12-26 ASSESSMENT — PAIN - FUNCTIONAL ASSESSMENT: PAIN_FUNCTIONAL_ASSESSMENT: 0-10

## 2023-12-26 NOTE — ED PROVIDER NOTES
HPI   Chief Complaint   Patient presents with    Back Pain     Pt is complaining of on going back and leg pain. Pt has been trying to get an appointment with pain management.       This is a 78-year-old woman with past medical history of ongoing back and leg pain seen for similar and admitted and discharged a few days prior does present with persistent pain.  States she is run out of her pain medication and has pain management appointment for 2 weeks from now.  Denies any fever or chills.  She is able to remain ambulatory.  No urinary fecal incontinence.  There was a plan for her to have outpatient surgery as well as pain management follow-up.                        Jocy Coma Scale Score: 15                  Patient History   Past Medical History:   Diagnosis Date    Personal history of other diseases of the respiratory system 09/14/2020    History of acute bronchitis    Unspecified fall, initial encounter 12/02/2021    Accidental fall, initial encounter     Past Surgical History:   Procedure Laterality Date    OTHER SURGICAL HISTORY  01/08/2020    Spinal surgery    OTHER SURGICAL HISTORY  01/08/2020    Hysterectomy    OTHER SURGICAL HISTORY  01/08/2020    Femur fracture repair     Family History   Problem Relation Name Age of Onset    Other (Cardiac Disorder) Mother          CABG; Defribillator in place    Coronary artery disease Mother      Other (Cardiac Disorder) Father          CABG; Defribillator in place    Coronary artery disease Father      Heart attack Father  54    Ovarian cancer Maternal Grandmother      Alzheimer's disease Other Aunt      Social History     Tobacco Use    Smoking status: Never    Smokeless tobacco: Never   Substance Use Topics    Alcohol use: Not Currently    Drug use: Never       Physical Exam   ED Triage Vitals [12/26/23 1004]   Temp Heart Rate Resp BP   36.7 °C (98 °F) 67 16 (!) 159/95      SpO2 Temp src Heart Rate Source Patient Position   100 % -- -- --      BP Location FiO2 (%)      -- --       Physical Exam  Vitals and nursing note reviewed.   Constitutional:       Appearance: Normal appearance. She is normal weight.   HENT:      Head: Normocephalic and atraumatic.      Mouth/Throat:      Mouth: Mucous membranes are moist.      Pharynx: Oropharynx is clear.   Eyes:      Extraocular Movements: Extraocular movements intact.      Conjunctiva/sclera: Conjunctivae normal.      Pupils: Pupils are equal, round, and reactive to light.   Cardiovascular:      Rate and Rhythm: Normal rate and regular rhythm.      Pulses: Normal pulses.      Heart sounds: Normal heart sounds.   Pulmonary:      Effort: Pulmonary effort is normal.      Breath sounds: Normal breath sounds.   Abdominal:      General: Abdomen is flat. Bowel sounds are normal. There is no distension.      Tenderness: There is no abdominal tenderness. There is no right CVA tenderness, left CVA tenderness, guarding or rebound.   Musculoskeletal:         General: Normal range of motion.      Cervical back: Normal range of motion and neck supple.   Skin:     General: Skin is warm and dry.      Capillary Refill: Capillary refill takes less than 2 seconds.   Neurological:      General: No focal deficit present.      Mental Status: She is alert and oriented to person, place, and time. Mental status is at baseline.      Sensory: No sensory deficit.      Motor: No weakness.      Comments: Tenderness palpation over right-sided paralumbar muscles.  No midline tenderness to palpation.  Motor and sensory intact distal down the legs.   Psychiatric:         Mood and Affect: Mood normal.         Behavior: Behavior normal.         Thought Content: Thought content normal.         Judgment: Judgment normal.         ED Course & MDM   Diagnoses as of 12/28/23 1109   Acute right-sided low back pain with right-sided sciatica       Medical Decision Making  We did obtain lab work which did not show any elevated blood cell count systemic infection.  Globin is stable  at 13.4.  There was no acute kidney injury.  No metabolic and epic acidosis.  Troponin was negative for ACS.  She does not have any urinary or fecal incontinence.  No signs for acute cord compression clinically.  Patient did refuse admission.  She is requesting refill for pain medication which is provided and plan to follow-up with her outpatient pain management provider.  Return precautions are discussed.        Procedure  Procedures     Wesley Valadez MD  12/26/23 5099       Wesley Valadez MD  12/28/23 4584

## 2023-12-26 NOTE — DISCHARGE INSTRUCTIONS
Please take your Percocet as prescribed to help with breakthrough pain.  Please follow-up with your primary care physician in the next 1 to 2 days for repeat exam to ensure improvement in symptoms.  Please return to the ED for worsening pain, inability to ambulate or any other concerning symptoms

## 2023-12-27 RX ORDER — TOPIRAMATE 100 MG/1
TABLET, FILM COATED ORAL
Qty: 90 TABLET | Refills: 6 | Status: SHIPPED | OUTPATIENT
Start: 2023-12-27 | End: 2024-06-03

## 2023-12-28 ENCOUNTER — APPOINTMENT (OUTPATIENT)
Dept: RADIOLOGY | Facility: HOSPITAL | Age: 78
End: 2023-12-28
Payer: COMMERCIAL

## 2023-12-28 ENCOUNTER — HOSPITAL ENCOUNTER (OUTPATIENT)
Facility: HOSPITAL | Age: 78
Setting detail: OBSERVATION
Discharge: HOSPICE | End: 2023-12-30
Attending: INTERNAL MEDICINE | Admitting: INTERNAL MEDICINE
Payer: COMMERCIAL

## 2023-12-28 ENCOUNTER — APPOINTMENT (OUTPATIENT)
Dept: CARDIOLOGY | Facility: HOSPITAL | Age: 78
End: 2023-12-28
Payer: COMMERCIAL

## 2023-12-28 DIAGNOSIS — M54.41 CHRONIC RIGHT-SIDED LOW BACK PAIN WITH RIGHT-SIDED SCIATICA: Primary | ICD-10-CM

## 2023-12-28 DIAGNOSIS — K52.9 COLITIS: ICD-10-CM

## 2023-12-28 DIAGNOSIS — I95.9 HYPOTENSION, UNSPECIFIED HYPOTENSION TYPE: ICD-10-CM

## 2023-12-28 DIAGNOSIS — N17.9 AKI (ACUTE KIDNEY INJURY) (CMS-HCC): ICD-10-CM

## 2023-12-28 DIAGNOSIS — G89.29 CHRONIC RIGHT-SIDED LOW BACK PAIN WITH RIGHT-SIDED SCIATICA: Primary | ICD-10-CM

## 2023-12-28 DIAGNOSIS — I47.10 SVT (SUPRAVENTRICULAR TACHYCARDIA) (CMS-HCC): ICD-10-CM

## 2023-12-28 DIAGNOSIS — R41.82 ALTERED MENTAL STATUS, UNSPECIFIED ALTERED MENTAL STATUS TYPE: ICD-10-CM

## 2023-12-28 LAB
ALBUMIN SERPL BCP-MCNC: 4.2 G/DL (ref 3.4–5)
ALP SERPL-CCNC: 82 U/L (ref 33–136)
ALT SERPL W P-5'-P-CCNC: 12 U/L (ref 7–45)
AMORPH CRY #/AREA UR COMP ASSIST: ABNORMAL /HPF
AMPHETAMINES UR QL SCN: ABNORMAL
ANION GAP SERPL CALC-SCNC: 24 MMOL/L (ref 10–20)
APAP SERPL-MCNC: <10 UG/ML
APPEARANCE UR: ABNORMAL
AST SERPL W P-5'-P-CCNC: 17 U/L (ref 9–39)
BACTERIA #/AREA URNS AUTO: ABNORMAL /HPF
BARBITURATES UR QL SCN: ABNORMAL
BASOPHILS # BLD AUTO: 0.09 X10*3/UL (ref 0–0.1)
BASOPHILS NFR BLD AUTO: 0.4 %
BENZODIAZ UR QL SCN: ABNORMAL
BILIRUB SERPL-MCNC: 0.6 MG/DL (ref 0–1.2)
BILIRUB UR STRIP.AUTO-MCNC: NEGATIVE MG/DL
BNP SERPL-MCNC: 72 PG/ML (ref 0–99)
BUN SERPL-MCNC: 16 MG/DL (ref 6–23)
BZE UR QL SCN: ABNORMAL
CALCIUM SERPL-MCNC: 9.3 MG/DL (ref 8.6–10.3)
CANNABINOIDS UR QL SCN: ABNORMAL
CHLORIDE SERPL-SCNC: 99 MMOL/L (ref 98–107)
CO2 SERPL-SCNC: 21 MMOL/L (ref 21–32)
COLOR UR: YELLOW
CREAT SERPL-MCNC: 0.83 MG/DL (ref 0.5–1.05)
CREAT SERPL-MCNC: 1.19 MG/DL (ref 0.5–1.05)
EOSINOPHIL # BLD AUTO: 0.06 X10*3/UL (ref 0–0.4)
EOSINOPHIL NFR BLD AUTO: 0.3 %
ERYTHROCYTE [DISTWIDTH] IN BLOOD BY AUTOMATED COUNT: 15.6 % (ref 11.5–14.5)
ETHANOL SERPL-MCNC: <10 MG/DL
FENTANYL+NORFENTANYL UR QL SCN: ABNORMAL
FLUAV RNA RESP QL NAA+PROBE: NOT DETECTED
FLUBV RNA RESP QL NAA+PROBE: NOT DETECTED
GFR SERPL CREATININE-BSD FRML MDRD: 47 ML/MIN/1.73M*2
GFR SERPL CREATININE-BSD FRML MDRD: 73 ML/MIN/1.73M*2
GLUCOSE SERPL-MCNC: 136 MG/DL (ref 74–99)
GLUCOSE UR STRIP.AUTO-MCNC: NEGATIVE MG/DL
HCT VFR BLD AUTO: 46.7 % (ref 36–46)
HGB BLD-MCNC: 15.1 G/DL (ref 12–16)
HYALINE CASTS #/AREA URNS AUTO: ABNORMAL /LPF
IMM GRANULOCYTES # BLD AUTO: 0.23 X10*3/UL (ref 0–0.5)
IMM GRANULOCYTES NFR BLD AUTO: 1.1 % (ref 0–0.9)
KETONES UR STRIP.AUTO-MCNC: ABNORMAL MG/DL
LACTATE SERPL-SCNC: 1.4 MMOL/L (ref 0.4–2)
LACTATE SERPL-SCNC: 2.1 MMOL/L (ref 0.4–2)
LEUKOCYTE ESTERASE UR QL STRIP.AUTO: ABNORMAL
LYMPHOCYTES # BLD AUTO: 1.9 X10*3/UL (ref 0.8–3)
LYMPHOCYTES NFR BLD AUTO: 9.5 %
MAGNESIUM SERPL-MCNC: 2.7 MG/DL (ref 1.6–2.4)
MCH RBC QN AUTO: 29.7 PG (ref 26–34)
MCHC RBC AUTO-ENTMCNC: 32.3 G/DL (ref 32–36)
MCV RBC AUTO: 92 FL (ref 80–100)
MONOCYTES # BLD AUTO: 0.86 X10*3/UL (ref 0.05–0.8)
MONOCYTES NFR BLD AUTO: 4.3 %
MUCOUS THREADS #/AREA URNS AUTO: ABNORMAL /LPF
NEUTROPHILS # BLD AUTO: 16.91 X10*3/UL (ref 1.6–5.5)
NEUTROPHILS NFR BLD AUTO: 84.4 %
NITRITE UR QL STRIP.AUTO: NEGATIVE
NRBC BLD-RTO: 0 /100 WBCS (ref 0–0)
OPIATES UR QL SCN: ABNORMAL
OXYCODONE+OXYMORPHONE UR QL SCN: ABNORMAL
PCP UR QL SCN: ABNORMAL
PH UR STRIP.AUTO: 6 [PH]
PLATELET # BLD AUTO: 399 X10*3/UL (ref 150–450)
POTASSIUM SERPL-SCNC: 3.6 MMOL/L (ref 3.5–5.3)
PROT SERPL-MCNC: 6.9 G/DL (ref 6.4–8.2)
PROT UR STRIP.AUTO-MCNC: ABNORMAL MG/DL
RBC # BLD AUTO: 5.08 X10*6/UL (ref 4–5.2)
RBC # UR STRIP.AUTO: ABNORMAL /UL
RBC #/AREA URNS AUTO: ABNORMAL /HPF
RSV RNA RESP QL NAA+PROBE: NOT DETECTED
SALICYLATES SERPL-MCNC: <3 MG/DL
SARS-COV-2 RNA RESP QL NAA+PROBE: NOT DETECTED
SODIUM SERPL-SCNC: 140 MMOL/L (ref 136–145)
SP GR UR STRIP.AUTO: 1.01
SQUAMOUS #/AREA URNS AUTO: ABNORMAL /HPF
UROBILINOGEN UR STRIP.AUTO-MCNC: <2 MG/DL
WBC # BLD AUTO: 20.1 X10*3/UL (ref 4.4–11.3)
WBC #/AREA URNS AUTO: ABNORMAL /HPF

## 2023-12-28 PROCEDURE — 74176 CT ABD & PELVIS W/O CONTRAST: CPT

## 2023-12-28 PROCEDURE — 81001 URINALYSIS AUTO W/SCOPE: CPT | Mod: 59 | Performed by: INTERNAL MEDICINE

## 2023-12-28 PROCEDURE — 2500000001 HC RX 250 WO HCPCS SELF ADMINISTERED DRUGS (ALT 637 FOR MEDICARE OP): Performed by: INTERNAL MEDICINE

## 2023-12-28 PROCEDURE — 99291 CRITICAL CARE FIRST HOUR: CPT | Performed by: INTERNAL MEDICINE

## 2023-12-28 PROCEDURE — 71045 X-RAY EXAM CHEST 1 VIEW: CPT | Mod: FR

## 2023-12-28 PROCEDURE — 87086 URINE CULTURE/COLONY COUNT: CPT | Mod: AHULAB | Performed by: INTERNAL MEDICINE

## 2023-12-28 PROCEDURE — 96376 TX/PRO/DX INJ SAME DRUG ADON: CPT

## 2023-12-28 PROCEDURE — 36415 COLL VENOUS BLD VENIPUNCTURE: CPT | Performed by: INTERNAL MEDICINE

## 2023-12-28 PROCEDURE — 87040 BLOOD CULTURE FOR BACTERIA: CPT | Mod: AHULAB | Performed by: INTERNAL MEDICINE

## 2023-12-28 PROCEDURE — 83880 ASSAY OF NATRIURETIC PEPTIDE: CPT | Performed by: INTERNAL MEDICINE

## 2023-12-28 PROCEDURE — 2500000002 HC RX 250 W HCPCS SELF ADMINISTERED DRUGS (ALT 637 FOR MEDICARE OP, ALT 636 FOR OP/ED): Performed by: INTERNAL MEDICINE

## 2023-12-28 PROCEDURE — 71045 X-RAY EXAM CHEST 1 VIEW: CPT | Mod: FOREIGN READ | Performed by: RADIOLOGY

## 2023-12-28 PROCEDURE — 93005 ELECTROCARDIOGRAM TRACING: CPT

## 2023-12-28 PROCEDURE — 87637 SARSCOV2&INF A&B&RSV AMP PRB: CPT | Performed by: INTERNAL MEDICINE

## 2023-12-28 PROCEDURE — 2500000004 HC RX 250 GENERAL PHARMACY W/ HCPCS (ALT 636 FOR OP/ED): Performed by: INTERNAL MEDICINE

## 2023-12-28 PROCEDURE — G0378 HOSPITAL OBSERVATION PER HR: HCPCS

## 2023-12-28 PROCEDURE — 96374 THER/PROPH/DIAG INJ IV PUSH: CPT

## 2023-12-28 PROCEDURE — 83735 ASSAY OF MAGNESIUM: CPT | Performed by: INTERNAL MEDICINE

## 2023-12-28 PROCEDURE — 82565 ASSAY OF CREATININE: CPT | Performed by: INTERNAL MEDICINE

## 2023-12-28 PROCEDURE — 83605 ASSAY OF LACTIC ACID: CPT | Performed by: INTERNAL MEDICINE

## 2023-12-28 PROCEDURE — 96375 TX/PRO/DX INJ NEW DRUG ADDON: CPT

## 2023-12-28 PROCEDURE — 80307 DRUG TEST PRSMV CHEM ANLYZR: CPT | Performed by: INTERNAL MEDICINE

## 2023-12-28 PROCEDURE — 74176 CT ABD & PELVIS W/O CONTRAST: CPT | Mod: FOREIGN READ | Performed by: RADIOLOGY

## 2023-12-28 PROCEDURE — 99222 1ST HOSP IP/OBS MODERATE 55: CPT | Performed by: INTERNAL MEDICINE

## 2023-12-28 PROCEDURE — 85025 COMPLETE CBC W/AUTO DIFF WBC: CPT | Performed by: INTERNAL MEDICINE

## 2023-12-28 PROCEDURE — 80053 COMPREHEN METABOLIC PANEL: CPT | Performed by: INTERNAL MEDICINE

## 2023-12-28 PROCEDURE — 80143 DRUG ASSAY ACETAMINOPHEN: CPT | Performed by: INTERNAL MEDICINE

## 2023-12-28 RX ORDER — VENLAFAXINE HYDROCHLORIDE 75 MG/1
300 CAPSULE, EXTENDED RELEASE ORAL
Status: DISCONTINUED | OUTPATIENT
Start: 2023-12-29 | End: 2023-12-30 | Stop reason: HOSPADM

## 2023-12-28 RX ORDER — DULOXETIN HYDROCHLORIDE 30 MG/1
60 CAPSULE, DELAYED RELEASE ORAL EVERY MORNING
Status: DISCONTINUED | OUTPATIENT
Start: 2023-12-29 | End: 2023-12-30 | Stop reason: HOSPADM

## 2023-12-28 RX ORDER — CHLORZOXAZONE 500 MG/1
500 TABLET ORAL 3 TIMES DAILY PRN
Status: DISCONTINUED | OUTPATIENT
Start: 2023-12-28 | End: 2023-12-28

## 2023-12-28 RX ORDER — ONDANSETRON HYDROCHLORIDE 2 MG/ML
4 INJECTION, SOLUTION INTRAVENOUS EVERY 8 HOURS PRN
Status: DISCONTINUED | OUTPATIENT
Start: 2023-12-28 | End: 2023-12-30 | Stop reason: HOSPADM

## 2023-12-28 RX ORDER — ADENOSINE 3 MG/ML
6 INJECTION, SOLUTION INTRAVENOUS ONCE
Status: COMPLETED | OUTPATIENT
Start: 2023-12-28 | End: 2023-12-28

## 2023-12-28 RX ORDER — DOCUSATE SODIUM 100 MG/1
100 CAPSULE, LIQUID FILLED ORAL EVERY 12 HOURS
Status: CANCELLED | OUTPATIENT
Start: 2023-12-28

## 2023-12-28 RX ORDER — ACETAMINOPHEN 160 MG/5ML
650 SOLUTION ORAL EVERY 4 HOURS PRN
Status: DISCONTINUED | OUTPATIENT
Start: 2023-12-28 | End: 2023-12-30 | Stop reason: HOSPADM

## 2023-12-28 RX ORDER — PANTOPRAZOLE SODIUM 40 MG/10ML
40 INJECTION, POWDER, LYOPHILIZED, FOR SOLUTION INTRAVENOUS
Status: DISCONTINUED | OUTPATIENT
Start: 2023-12-29 | End: 2023-12-30 | Stop reason: HOSPADM

## 2023-12-28 RX ORDER — HYDROXYZINE HYDROCHLORIDE 25 MG/1
100 TABLET, FILM COATED ORAL NIGHTLY PRN
Status: DISCONTINUED | OUTPATIENT
Start: 2023-12-28 | End: 2023-12-28

## 2023-12-28 RX ORDER — TOPIRAMATE 100 MG/1
100 TABLET, FILM COATED ORAL DAILY
Status: DISCONTINUED | OUTPATIENT
Start: 2023-12-29 | End: 2023-12-30 | Stop reason: HOSPADM

## 2023-12-28 RX ORDER — OXYCODONE AND ACETAMINOPHEN 5; 325 MG/1; MG/1
1 TABLET ORAL EVERY 6 HOURS PRN
Status: DISCONTINUED | OUTPATIENT
Start: 2023-12-28 | End: 2023-12-30 | Stop reason: HOSPADM

## 2023-12-28 RX ORDER — ENOXAPARIN SODIUM 100 MG/ML
40 INJECTION SUBCUTANEOUS EVERY 24 HOURS
Status: DISCONTINUED | OUTPATIENT
Start: 2023-12-28 | End: 2023-12-30 | Stop reason: HOSPADM

## 2023-12-28 RX ORDER — PANTOPRAZOLE SODIUM 40 MG/1
40 TABLET, DELAYED RELEASE ORAL
Status: DISCONTINUED | OUTPATIENT
Start: 2023-12-29 | End: 2023-12-30 | Stop reason: HOSPADM

## 2023-12-28 RX ORDER — HYDROMORPHONE HYDROCHLORIDE 1 MG/ML
1 INJECTION, SOLUTION INTRAMUSCULAR; INTRAVENOUS; SUBCUTANEOUS ONCE
Status: COMPLETED | OUTPATIENT
Start: 2023-12-28 | End: 2023-12-28

## 2023-12-28 RX ORDER — BISACODYL 10 MG/1
10 SUPPOSITORY RECTAL DAILY
Status: DISCONTINUED | OUTPATIENT
Start: 2023-12-28 | End: 2023-12-30 | Stop reason: HOSPADM

## 2023-12-28 RX ORDER — ACETAMINOPHEN 325 MG/1
650 TABLET ORAL EVERY 4 HOURS PRN
Status: DISCONTINUED | OUTPATIENT
Start: 2023-12-28 | End: 2023-12-30 | Stop reason: HOSPADM

## 2023-12-28 RX ORDER — HYDROXYZINE HYDROCHLORIDE 25 MG/1
100 TABLET, FILM COATED ORAL 2 TIMES DAILY
Status: DISCONTINUED | OUTPATIENT
Start: 2023-12-28 | End: 2023-12-30 | Stop reason: HOSPADM

## 2023-12-28 RX ORDER — HYDROXYZINE HYDROCHLORIDE 25 MG/1
100 TABLET, FILM COATED ORAL
Status: CANCELLED | OUTPATIENT
Start: 2023-12-28

## 2023-12-28 RX ORDER — DULOXETIN HYDROCHLORIDE 30 MG/1
60 CAPSULE, DELAYED RELEASE ORAL EVERY MORNING
Status: CANCELLED | OUTPATIENT
Start: 2023-12-29

## 2023-12-28 RX ORDER — ONDANSETRON 4 MG/1
4 TABLET, FILM COATED ORAL EVERY 8 HOURS PRN
Status: DISCONTINUED | OUTPATIENT
Start: 2023-12-28 | End: 2023-12-30 | Stop reason: HOSPADM

## 2023-12-28 RX ORDER — CHLORZOXAZONE 500 MG/1
500 TABLET ORAL 3 TIMES DAILY PRN
Status: CANCELLED | OUTPATIENT
Start: 2023-12-28

## 2023-12-28 RX ORDER — GABAPENTIN 400 MG/1
1200 CAPSULE ORAL 3 TIMES DAILY
Status: DISCONTINUED | OUTPATIENT
Start: 2023-12-28 | End: 2023-12-30 | Stop reason: HOSPADM

## 2023-12-28 RX ORDER — ACETAMINOPHEN 650 MG/1
650 SUPPOSITORY RECTAL EVERY 4 HOURS PRN
Status: DISCONTINUED | OUTPATIENT
Start: 2023-12-28 | End: 2023-12-30 | Stop reason: HOSPADM

## 2023-12-28 RX ORDER — ZOLPIDEM TARTRATE 5 MG/1
5 TABLET ORAL NIGHTLY PRN
Status: DISCONTINUED | OUTPATIENT
Start: 2023-12-28 | End: 2023-12-30 | Stop reason: HOSPADM

## 2023-12-28 RX ORDER — GABAPENTIN 400 MG/1
1200 CAPSULE ORAL 3 TIMES DAILY
Status: CANCELLED | OUTPATIENT
Start: 2023-12-28

## 2023-12-28 RX ORDER — DOCUSATE SODIUM 100 MG/1
100 CAPSULE, LIQUID FILLED ORAL EVERY 12 HOURS
Status: DISCONTINUED | OUTPATIENT
Start: 2023-12-28 | End: 2023-12-30 | Stop reason: HOSPADM

## 2023-12-28 RX ORDER — METOPROLOL SUCCINATE 25 MG/1
25 TABLET, EXTENDED RELEASE ORAL DAILY
Status: DISCONTINUED | OUTPATIENT
Start: 2023-12-28 | End: 2023-12-30 | Stop reason: HOSPADM

## 2023-12-28 RX ADMIN — ZOLPIDEM TARTRATE 5 MG: 5 TABLET ORAL at 22:03

## 2023-12-28 RX ADMIN — SODIUM CHLORIDE 1000 ML: 9 INJECTION, SOLUTION INTRAVENOUS at 07:38

## 2023-12-28 RX ADMIN — HYDROXYZINE HYDROCHLORIDE 100 MG: 25 TABLET, FILM COATED ORAL at 22:03

## 2023-12-28 RX ADMIN — HYDROMORPHONE HYDROCHLORIDE 0.4 MG: 1 INJECTION, SOLUTION INTRAMUSCULAR; INTRAVENOUS; SUBCUTANEOUS at 21:34

## 2023-12-28 RX ADMIN — METOPROLOL SUCCINATE 25 MG: 25 TABLET, EXTENDED RELEASE ORAL at 21:34

## 2023-12-28 RX ADMIN — ADENOSINE 6 MG: 3 INJECTION INTRAVENOUS at 07:38

## 2023-12-28 RX ADMIN — GABAPENTIN 1200 MG: 400 CAPSULE ORAL at 21:34

## 2023-12-28 RX ADMIN — HYDROMORPHONE HYDROCHLORIDE 1 MG: 1 INJECTION, SOLUTION INTRAMUSCULAR; INTRAVENOUS; SUBCUTANEOUS at 10:22

## 2023-12-28 RX ADMIN — HYDROMORPHONE HYDROCHLORIDE 1 MG: 1 INJECTION, SOLUTION INTRAMUSCULAR; INTRAVENOUS; SUBCUTANEOUS at 14:23

## 2023-12-28 ASSESSMENT — ACTIVITIES OF DAILY LIVING (ADL): LACK_OF_TRANSPORTATION: NO

## 2023-12-28 ASSESSMENT — PAIN - FUNCTIONAL ASSESSMENT
PAIN_FUNCTIONAL_ASSESSMENT: 0-10
PAIN_FUNCTIONAL_ASSESSMENT: 0-10

## 2023-12-28 ASSESSMENT — PAIN SCALES - GENERAL
PAINLEVEL_OUTOF10: 10 - WORST POSSIBLE PAIN
PAINLEVEL_OUTOF10: 10 - WORST POSSIBLE PAIN
PAINLEVEL_OUTOF10: 7

## 2023-12-28 NOTE — PROGRESS NOTES
Uses rollator     12/28/23 0937   [facility] Disability Status   Are you deaf or do you have serious difficulty hearing? N   Are you blind or do you have serious difficulty seeing, even when wearing glasses? N   Because of a physical, mental, or emotional condition, do you have serious difficulty concentrating, remembering, or making decisions? (5 years old or older) Y   Do you have serious difficulty walking or climbing stairs? Y   Do you have serious difficulty dressing or bathing? Y   Because of a physical, mental, or emotional condition, do you have serious difficulty doing errands alone such as visiting the doctor? Y
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
## 2023-12-28 NOTE — PROGRESS NOTES
[facility] with dog     12/28/23 0937   Current Planned Discharge Disposition   Current Planned Discharge Disposition Home
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
## 2023-12-28 NOTE — ED NOTES
Pt Hr 185, MD at bedside, adenosine administered, 6m, pt awake the entire time. Cardiac monitor in use.      Mary Echols RN  12/28/23 1323

## 2023-12-28 NOTE — ED PROVIDER NOTES
HPI     CC: Irregular Heart Beat     HPI: Roby Qureshi is a 78 y.o. female with a history of chronic back pain/lumbar radiculopathy, followed by pain management, recurrent SVT, presents with altered mental status and SVT.  Patient states she does not know who called 911.  She states she did not want to come here.  Per EMS, they think a  called them.  She was reportedly confused with Cymbalta and Percocet pills all over her apartment.  She had been complaining of some diarrhea.  Her only complaint to me at this time is her chronic pain in her entire spine radiating to the right leg which is not new.  She denies chest pain or palpitations.  She is complaining of a headache.  She denies any fall.  She denies any suicidality or overdose.  She states her pills just fell all over.  She was noted to be in SVT in the 190s per EMS, /60.  She did not receive any interventions prior to arrival.  Of note, patient presented on 12/14 with SVT as well, responded well to adenosine.    ROS: 10-point review of systems was performed and is otherwise negative except as noted in HPI.    Limitations to history: N/A    Independent Historians: EMS    External Records Reviewed: Multiple prior ED notes and DC summaries    Past Medical History: Noncontributory except per HPI     Past Surgical History: Noncontributory except per HPI     Family History: Reviewed and noncontributory     Social History:  Denies tobacco. Denies ETOH. Denies illicit drugs.    Social Determinants Affecting Care: N/A    Allergies   Allergen Reactions    Oxycodone Itching       Home Meds:   Current Outpatient Medications   Medication Instructions    bisacodyl (DULCOLAX) 10 mg, rectal, Daily    chlorzoxazone (Parafon Forte) 500 mg tablet 1 tablet, oral, 3 times daily PRN    docusate sodium (COLACE) 100 mg, oral, Every 12 hours    DULoxetine (Cymbalta) 60 mg DR capsule 1 tablet, oral, Every morning    gabapentin (NEURONTIN) 1,200 mg, oral, 3  times daily    hydrOXYzine HCL (Atarax) 50 mg tablet 2 tablets, oral, Nightly PRN    hydrOXYzine HCL (ATARAX) 100 mg, oral, 2 times daily    hydrOXYzine pamoate (VISTARIL) 25 mg, oral, Daily PRN    ibuprofen 400 mg tablet 1 tablet, oral, Every 6 hours    Lactobacillus acidophilus (PROBIOTIC ORAL) 1 capsule, oral, 2 times daily    methylcellulose, laxative, (CitruceL) 500 mg tablet 2 tablets, oral, Nightly    metoprolol succinate XL (Toprol-XL) 25 mg 24 hr tablet 1 tablet, oral, Daily    oxyCODONE-acetaminophen (Percocet) 5-325 mg tablet 1 tablet, oral, Every 6 hours PRN    tiZANidine (Zanaflex) 4 mg tablet 0.5-1 tablets, oral, 3 times daily    topiramate (Topamax) 100 mg tablet TAKE 1 TABLET BY MOUTH EVERY MORNING AND 2 TABLETS AT NIGHT    venlafaxine 300 mg, oral    zolpidem (AMBIEN) 5 mg, oral, Nightly PRN        Physical Exam     ED Triage Vitals [12/28/23 0750]   Temp Heart Rate Resp BP   -- (!) 185 22 (!) 81/42      SpO2 Temp src Heart Rate Source Patient Position   100 % -- -- --      BP Location FiO2 (%)     -- --         Heart Rate:  []   Resp:  [5-22]   BP: ()/(42-83)   SpO2:  [94 %-100 %]      Physical Exam  Vitals and nursing note reviewed.     CONSTITUTIONAL: Chronically ill appearing, well nourished, in no acute distress.   HENMT: Head atraumatic. Airway patent. Nasal mucosa clear. Mouth with normal mucosa, clear oropharynx. Uvula midline. Neck supple.    EYES: Clear bilaterally, pupils equally round and reactive to light.   CARDIOVASCULAR: Tachycardic, regular rhythm.  Heart sounds S1, S2.  No murmurs, rubs or gallops. Normal pulses. Capillary refill < 2 sec.   RESPIRATORY: No increased work of breathing. Breath sounds clear and equal bilaterally.  GASTROINTESTINAL: Abdomen soft, non-distended, non-tender. No rebound, no guarding. Normal bowel sounds. No palpable masses.  GENITOURINARY:  No CVA tenderness.  MUSCULOSKELETAL: Spine appears normal, range of motion is not limited, no muscle or  joint tenderness. No edema.   NEUROLOGICAL: Alert and oriented to self, location but not date, no asymmetry, moving all extremities equally.  SKIN: Warm, dry and intact. No rash or notable lesions.  PSYCHIATRIC: Normal mood and affect.  HEME/LYMPH: No adenopathy or splenomegaly.    Diagnostic Results      ECG: ECGs read and interpreted by me. See ED Course, below, for interpretation.    Labs Reviewed   CBC WITH AUTO DIFFERENTIAL - Abnormal       Result Value    WBC 20.1 (*)     nRBC 0.0      RBC 5.08      Hemoglobin 15.1      Hematocrit 46.7 (*)     MCV 92      MCH 29.7      MCHC 32.3      RDW 15.6 (*)     Platelets 399      Neutrophils % 84.4      Immature Granulocytes %, Automated 1.1 (*)     Lymphocytes % 9.5      Monocytes % 4.3      Eosinophils % 0.3      Basophils % 0.4      Neutrophils Absolute 16.91 (*)     Immature Granulocytes Absolute, Automated 0.23      Lymphocytes Absolute 1.90      Monocytes Absolute 0.86 (*)     Eosinophils Absolute 0.06      Basophils Absolute 0.09     COMPREHENSIVE METABOLIC PANEL - Abnormal    Glucose 136 (*)     Sodium 140      Potassium 3.6      Chloride 99      Bicarbonate 21      Anion Gap 24 (*)     Urea Nitrogen 16      Creatinine 1.19 (*)     eGFR 47 (*)     Calcium 9.3      Albumin 4.2      Alkaline Phosphatase 82      Total Protein 6.9      AST 17      Bilirubin, Total 0.6      ALT 12     MAGNESIUM - Abnormal    Magnesium 2.70 (*)    LACTATE - Abnormal    Lactate 2.1 (*)     Narrative:     Venipuncture immediately after or during the administration of Metamizole may lead to falsely low results. Testing should be performed immediately  prior to Metamizole dosing.   B-TYPE NATRIURETIC PEPTIDE - Normal    BNP 72      Narrative:        <100 pg/mL - Heart failure unlikely  100-299 pg/mL - Intermediate probability of acute heart                  failure exacerbation. Correlate with clinical                  context and patient history.    >=300 pg/mL - Heart Failure likely.  Correlate with clinical                  context and patient history.    BNP testing is performed using different testing methodology at [facility] than at other [facility]. Direct result comparisons should only be made within the same method.      ACUTE TOXICOLOGY PANEL, BLOOD - Normal    Acetaminophen <10.0      Salicylate  <3      Alcohol <10     SARS-COV-2 AND INFLUENZA A/B PCR - Normal    Flu A Result Not Detected      Flu B Result Not Detected      Coronavirus 2019, PCR Not Detected      Narrative:     This assay has received FDA Emergency Use Authorization (EUA) and  is only authorized for the duration of time that circumstances exist to justify the authorization of the emergency use of in vitro diagnostic tests for the detection of SARS-CoV-2 virus and/or diagnosis of COVID-19 infection under section 564(b)(1) of the Act, 21 U.S.C. 360bbb-3(b)(1). Testing for SARS-CoV-2 is only recommended for patients who meet current clinical and/or epidemiological criteria as defined by federal, state, or local public health directives. This assay is an in vitro diagnostic nucleic acid amplification test for the qualitative detection of SARS-CoV-2, Influenza A, and Influenza B from nasopharyngeal specimens and has been validated for use at [facility]. Negative results do not preclude COVID-19 infections or Influenza A/B infections, and should not be used as the sole basis for diagnosis, treatment, or other management decisions. If Influenza A/B and RSV PCR results are negative, testing for Parainfluenza virus, Adenovirus and Metapneumovirus is routinely performed for [facility] [place], and is available on other patients by placing an add-on request.    RSV PCR - Normal    RSV PCR Not Detected      Narrative:     This assay is an FDA-cleared, in vitro diagnostic nucleic acid amplification test for the detection of RSV from nasopharyngeal  specimens, and has been validated for use at [facility]. Negative results do not preclude RSV infections, and should not be used as the sole basis for diagnosis, treatment, or other management decisions. If Influenza A/B and RSV PCR results are negative, testing for Parainfluenza virus, Adenovirus and Metapneumovirus is routinely performed for pediatric oncology and intensive care inpatients at [facility], and is available on other patients by placing an add-on request.       CREATININE - Normal    Creatinine 0.83      eGFR 73     STOOL PATHOGEN PANEL, PCR   C. DIFFICILE, PCR   BLOOD CULTURE   BLOOD CULTURE   URINALYSIS WITH REFLEX CULTURE AND MICROSCOPIC    Narrative:     The following orders were created for panel order Urinalysis with Reflex Culture and Microscopic.  Procedure                               Abnormality         Status                     ---------                               -----------         ------                     Urinalysis with Reflex C...[[id number]]                      In process                 Extra Urine Gray Tube[[id number]]                                                         Please view results for these tests on the individual orders.   DRUG SCREEN,URINE   URINALYSIS WITH REFLEX CULTURE AND MICROSCOPIC   EXTRA URINE GRAY TUBE   LACTATE         CT abdomen pelvis wo IV contrast   Final Result   1. Liquid stool noted throughout the colon suggestive of a diarrheal   state.  No other acute process.  Correlate clinically for possible   mild colitis.   2. Cholelithiasis.   3. Mild hepatic steatosis.   Signed by Jersey Cloud MD      XR chest 1 view   Final Result   No acute process.   Signed by Jersey Cloud MD                    Jocy Coma Scale Score: 15                  Procedure  Critical Care    Performed by: Ana Driscoll MD  Authorized by: Ana Driscoll MD    Critical care provider statement:     Critical care time (minutes):  35    Critical  care time was exclusive of:  Separately billable procedures and treating other patients and teaching time    Critical care was necessary to treat or prevent imminent or life-threatening deterioration of the following conditions:  Circulatory failure and dehydration    Critical care was time spent personally by me on the following activities:  Development of treatment plan with patient or surrogate, evaluation of patient's response to treatment, examination of patient, obtaining history from patient or surrogate, ordering and performing treatments and interventions, ordering and review of laboratory studies, ordering and review of radiographic studies, pulse oximetry, re-evaluation of patient's condition and review of old charts    Care discussed with: admitting provider        ED Course & MDM   Assessment/Plan:   Roby Qureshi is a 78 y.o. female with a history of chronic back pain/lumbar radiculopathy, followed by pain management, recurrent SVT, presents with altered mental status and SVT.  She was hypotensive on arrival 79/49, with SVT in the 180s.  She is not notably confused for me but does not know the date.  Suspect confusion could have been related to unstable SVT or other cause.  Patient responded well to 6 mg adenosine IV and a fluid bolus, now tachycardic in the 110s.  Patient adamantly denies overdose despite EMS concerns that there were pill bottles everywhere.  Will continue to readdress with her and readdress symptoms.  Workup was initiated with labs, chest x-ray, viral testing.  Will defer troponin testing at this time as suspect that it will be elevated due to demand, patient is without chest pain or notable ischemic changes on her ECG.    See below for details of ED course and ultimate disposition.    Medications   sodium chloride 0.9 % bolus 1,000 mL (0 mL intravenous Stopped 12/28/23 0808)   HYDROmorphone (Dilaudid) injection 1 mg (1 mg intravenous Given 12/28/23 1022)   adenosine  (Adenocard) injection 6 mg (6 mg intravenous Given 12/28/23 0738)   HYDROmorphone (Dilaudid) injection 1 mg (1 mg intravenous Given 12/28/23 1423)        ED Course as of 12/28/23 1609   Thu Dec 28, 2023   0744 ECG read interpreted by me.  SVT, rate 185.  Incomplete RBBB.  Diffuse ST depressions with isolated elevation in aVR. [CG]   0809 Post adenosine: ECG read and interpreted by me.  Sinus tachycardia, rate 119.  Normal axis.  Normal intervals.  No major ST or T wave derangements. [CG]   0902 Labs notable for leukocytosis 20.1, normal H&H, normal platelets, CMP with VERO, creatinine 1.19 from 0.49, normal magnesium.  CT abdomen ordered as patient is having significant diarrhea with WBC 20, rule out acute intra-abdominal process.  Stool studies also ordered. Antibiotics deferred as no clear source and not clearly septic. Of note patient recently received steroids which could explain the leukocytosis.  [CG]   1007 CXR no acute process [CG]   1035 Viral swabs are negative.  Serum tox negative.  BNP normal. [CG]   1359 CTAP IMPRESSION:  1. Liquid stool noted throughout the colon suggestive of a diarrheal  state.  No other acute process.  Correlate clinically for possible  mild colitis.  2. Cholelithiasis.  3. Mild hepatic steatosis.   [CG]   1606 Patient was admitted under Dr. Cedillo for further management. Daughter updated by phone. RN still working on collecting urine sample.  [CG]      ED Course User Index  [CG] Ana Driscoll MD         Diagnoses as of 12/28/23 1609   SVT (supraventricular tachycardia)   Colitis   Hypotension, unspecified hypotension type   Altered mental status, unspecified altered mental status type   VERO (acute kidney injury) (CMS/[facility])       Disposition:   Admitted to Hospitalist team, discussed differential and findings with patient.      ED Prescriptions    None         Ana Driscoll MD  EM/IM/Peds    This note was dictated by speech recognition. Minor errors in transcription may be  present.     Ana Driscoll MD  12/28/23 1951

## 2023-12-29 ENCOUNTER — PHARMACY VISIT (OUTPATIENT)
Dept: PHARMACY | Facility: CLINIC | Age: 78
End: 2023-12-29
Payer: MEDICARE

## 2023-12-29 ENCOUNTER — APPOINTMENT (OUTPATIENT)
Dept: CARDIOLOGY | Facility: HOSPITAL | Age: 78
End: 2023-12-29
Payer: COMMERCIAL

## 2023-12-29 LAB
ANION GAP SERPL CALC-SCNC: 15 MMOL/L (ref 10–20)
ATRIAL RATE: 119 BPM
BACTERIA UR CULT: NORMAL
BUN SERPL-MCNC: 14 MG/DL (ref 6–23)
C DIF TOX TCDA+TCDB STL QL NAA+PROBE: NOT DETECTED
CALCIUM SERPL-MCNC: 8.4 MG/DL (ref 8.6–10.3)
CHLORIDE SERPL-SCNC: 103 MMOL/L (ref 98–107)
CO2 SERPL-SCNC: 23 MMOL/L (ref 21–32)
CREAT SERPL-MCNC: 0.55 MG/DL (ref 0.5–1.05)
ERYTHROCYTE [DISTWIDTH] IN BLOOD BY AUTOMATED COUNT: 15.8 % (ref 11.5–14.5)
GFR SERPL CREATININE-BSD FRML MDRD: >90 ML/MIN/1.73M*2
GLUCOSE SERPL-MCNC: 73 MG/DL (ref 74–99)
HCT VFR BLD AUTO: 38 % (ref 36–46)
HGB BLD-MCNC: 12 G/DL (ref 12–16)
MCH RBC QN AUTO: 29.6 PG (ref 26–34)
MCHC RBC AUTO-ENTMCNC: 31.6 G/DL (ref 32–36)
MCV RBC AUTO: 94 FL (ref 80–100)
NRBC BLD-RTO: 0 /100 WBCS (ref 0–0)
P AXIS: 56 DEGREES
P OFFSET: 208 MS
P ONSET: 145 MS
PLATELET # BLD AUTO: 296 X10*3/UL (ref 150–450)
POTASSIUM SERPL-SCNC: 3.6 MMOL/L (ref 3.5–5.3)
PR INTERVAL: 164 MS
Q ONSET: 224 MS
Q ONSET: 227 MS
QRS COUNT: 20 BEATS
QRS COUNT: 30 BEATS
QRS DURATION: 82 MS
QRS DURATION: 96 MS
QT INTERVAL: 250 MS
QT INTERVAL: 316 MS
QTC CALCULATION(BAZETT): 438 MS
QTC CALCULATION(BAZETT): 444 MS
QTC FREDERICIA: 363 MS
QTC FREDERICIA: 397 MS
R AXIS: 35 DEGREES
R AXIS: 64 DEGREES
RBC # BLD AUTO: 4.06 X10*6/UL (ref 4–5.2)
SODIUM SERPL-SCNC: 137 MMOL/L (ref 136–145)
T AXIS: -16 DEGREES
T AXIS: 44 DEGREES
T OFFSET: 349 MS
T OFFSET: 385 MS
TSH SERPL-ACNC: 0.63 MIU/L (ref 0.44–3.98)
VENTRICULAR RATE: 119 BPM
VENTRICULAR RATE: 185 BPM
WBC # BLD AUTO: 12 X10*3/UL (ref 4.4–11.3)

## 2023-12-29 PROCEDURE — 93005 ELECTROCARDIOGRAM TRACING: CPT

## 2023-12-29 PROCEDURE — 2500000004 HC RX 250 GENERAL PHARMACY W/ HCPCS (ALT 636 FOR OP/ED): Performed by: INTERNAL MEDICINE

## 2023-12-29 PROCEDURE — 96376 TX/PRO/DX INJ SAME DRUG ADON: CPT

## 2023-12-29 PROCEDURE — 2500000001 HC RX 250 WO HCPCS SELF ADMINISTERED DRUGS (ALT 637 FOR MEDICARE OP): Performed by: INTERNAL MEDICINE

## 2023-12-29 PROCEDURE — 97162 PT EVAL MOD COMPLEX 30 MIN: CPT | Mod: GP

## 2023-12-29 PROCEDURE — 84443 ASSAY THYROID STIM HORMONE: CPT | Performed by: INTERNAL MEDICINE

## 2023-12-29 PROCEDURE — 87493 C DIFF AMPLIFIED PROBE: CPT | Mod: AHULAB | Performed by: INTERNAL MEDICINE

## 2023-12-29 PROCEDURE — RXMED WILLOW AMBULATORY MEDICATION CHARGE

## 2023-12-29 PROCEDURE — 85027 COMPLETE CBC AUTOMATED: CPT | Performed by: INTERNAL MEDICINE

## 2023-12-29 PROCEDURE — G0378 HOSPITAL OBSERVATION PER HR: HCPCS

## 2023-12-29 PROCEDURE — 97165 OT EVAL LOW COMPLEX 30 MIN: CPT | Mod: GO

## 2023-12-29 PROCEDURE — 36415 COLL VENOUS BLD VENIPUNCTURE: CPT | Performed by: INTERNAL MEDICINE

## 2023-12-29 PROCEDURE — 82374 ASSAY BLOOD CARBON DIOXIDE: CPT | Performed by: INTERNAL MEDICINE

## 2023-12-29 PROCEDURE — 99221 1ST HOSP IP/OBS SF/LOW 40: CPT | Performed by: INTERNAL MEDICINE

## 2023-12-29 RX ORDER — OXYCODONE AND ACETAMINOPHEN 5; 325 MG/1; MG/1
1 TABLET ORAL EVERY 6 HOURS PRN
Qty: 15 TABLET | Refills: 0 | Status: ON HOLD | OUTPATIENT
Start: 2023-12-29 | End: 2024-01-22 | Stop reason: SDUPTHER

## 2023-12-29 RX ORDER — TRAMADOL HYDROCHLORIDE 50 MG/1
50 TABLET ORAL EVERY 8 HOURS PRN
COMMUNITY
End: 2024-01-14 | Stop reason: ENTERED-IN-ERROR

## 2023-12-29 RX ADMIN — HYDROXYZINE HYDROCHLORIDE 100 MG: 25 TABLET ORAL at 20:59

## 2023-12-29 RX ADMIN — HYDROXYZINE HYDROCHLORIDE 100 MG: 25 TABLET ORAL at 12:26

## 2023-12-29 RX ADMIN — VENLAFAXINE HYDROCHLORIDE 300 MG: 75 CAPSULE, EXTENDED RELEASE ORAL at 15:19

## 2023-12-29 RX ADMIN — HYDROMORPHONE HYDROCHLORIDE 0.4 MG: 1 INJECTION, SOLUTION INTRAMUSCULAR; INTRAVENOUS; SUBCUTANEOUS at 06:13

## 2023-12-29 RX ADMIN — GABAPENTIN 1200 MG: 400 CAPSULE ORAL at 08:56

## 2023-12-29 RX ADMIN — GABAPENTIN 1200 MG: 400 CAPSULE ORAL at 20:59

## 2023-12-29 RX ADMIN — HYDROMORPHONE HYDROCHLORIDE 0.4 MG: 1 INJECTION, SOLUTION INTRAMUSCULAR; INTRAVENOUS; SUBCUTANEOUS at 15:30

## 2023-12-29 RX ADMIN — DULOXETINE HYDROCHLORIDE 60 MG: 30 CAPSULE, DELAYED RELEASE ORAL at 08:55

## 2023-12-29 RX ADMIN — TOPIRAMATE 100 MG: 100 TABLET, FILM COATED ORAL at 08:56

## 2023-12-29 RX ADMIN — METOPROLOL SUCCINATE 25 MG: 25 TABLET, EXTENDED RELEASE ORAL at 08:55

## 2023-12-29 RX ADMIN — HYDROMORPHONE HYDROCHLORIDE 0.4 MG: 1 INJECTION, SOLUTION INTRAMUSCULAR; INTRAVENOUS; SUBCUTANEOUS at 10:39

## 2023-12-29 RX ADMIN — PANTOPRAZOLE SODIUM 40 MG: 40 TABLET, DELAYED RELEASE ORAL at 06:13

## 2023-12-29 RX ADMIN — GABAPENTIN 1200 MG: 400 CAPSULE ORAL at 18:05

## 2023-12-29 RX ADMIN — HYDROMORPHONE HYDROCHLORIDE 0.4 MG: 1 INJECTION, SOLUTION INTRAMUSCULAR; INTRAVENOUS; SUBCUTANEOUS at 19:47

## 2023-12-29 SDOH — SOCIAL STABILITY: SOCIAL INSECURITY: HAS ANYONE EVER THREATENED TO HURT YOUR FAMILY OR YOUR PETS?: NO

## 2023-12-29 SDOH — SOCIAL STABILITY: SOCIAL INSECURITY: ARE YOU OR HAVE YOU BEEN THREATENED OR ABUSED PHYSICALLY, EMOTIONALLY, OR SEXUALLY BY ANYONE?: NO

## 2023-12-29 SDOH — SOCIAL STABILITY: SOCIAL INSECURITY: HAVE YOU HAD THOUGHTS OF HARMING ANYONE ELSE?: NO

## 2023-12-29 SDOH — SOCIAL STABILITY: SOCIAL INSECURITY: DO YOU FEEL UNSAFE GOING BACK TO THE PLACE WHERE YOU ARE LIVING?: NO

## 2023-12-29 SDOH — SOCIAL STABILITY: SOCIAL INSECURITY: ARE THERE ANY APPARENT SIGNS OF INJURIES/BEHAVIORS THAT COULD BE RELATED TO ABUSE/NEGLECT?: NO

## 2023-12-29 SDOH — SOCIAL STABILITY: SOCIAL INSECURITY: DO YOU FEEL ANYONE HAS EXPLOITED OR TAKEN ADVANTAGE OF YOU FINANCIALLY OR OF YOUR PERSONAL PROPERTY?: NO

## 2023-12-29 SDOH — SOCIAL STABILITY: SOCIAL INSECURITY: DOES ANYONE TRY TO KEEP YOU FROM HAVING/CONTACTING OTHER FRIENDS OR DOING THINGS OUTSIDE YOUR HOME?: NO

## 2023-12-29 SDOH — SOCIAL STABILITY: SOCIAL INSECURITY: WERE YOU ABLE TO COMPLETE ALL THE BEHAVIORAL HEALTH SCREENINGS?: YES

## 2023-12-29 SDOH — SOCIAL STABILITY: SOCIAL INSECURITY: ABUSE: ADULT

## 2023-12-29 ASSESSMENT — LIFESTYLE VARIABLES
AUDIT-C TOTAL SCORE: 0
HOW MANY STANDARD DRINKS CONTAINING ALCOHOL DO YOU HAVE ON A TYPICAL DAY: PATIENT DOES NOT DRINK
HOW OFTEN DO YOU HAVE A DRINK CONTAINING ALCOHOL: NEVER
SKIP TO QUESTIONS 9-10: 1
PRESCIPTION_ABUSE_PAST_12_MONTHS: NO
SUBSTANCE_ABUSE_PAST_12_MONTHS: NO
HOW OFTEN DO YOU HAVE 6 OR MORE DRINKS ON ONE OCCASION: NEVER
AUDIT-C TOTAL SCORE: 0

## 2023-12-29 ASSESSMENT — PAIN SCALES - GENERAL
PAINLEVEL_OUTOF10: 10 - WORST POSSIBLE PAIN
PAINLEVEL_OUTOF10: 8
PAINLEVEL_OUTOF10: 5 - MODERATE PAIN
PAINLEVEL_OUTOF10: 10 - WORST POSSIBLE PAIN
PAINLEVEL_OUTOF10: 10 - WORST POSSIBLE PAIN
PAINLEVEL_OUTOF10: 8

## 2023-12-29 ASSESSMENT — ACTIVITIES OF DAILY LIVING (ADL)
WALKS IN HOME: NEEDS ASSISTANCE
GROOMING: NEEDS ASSISTANCE
HEARING - LEFT EAR: FUNCTIONAL
BATHING: NEEDS ASSISTANCE
TOILETING: NEEDS ASSISTANCE
DRESSING YOURSELF: NEEDS ASSISTANCE
PATIENT'S MEMORY ADEQUATE TO SAFELY COMPLETE DAILY ACTIVITIES?: NO
ADL_ASSISTANCE: INDEPENDENT
HEARING - RIGHT EAR: FUNCTIONAL
FEEDING YOURSELF: NEEDS ASSISTANCE
JUDGMENT_ADEQUATE_SAFELY_COMPLETE_DAILY_ACTIVITIES: NO
ADEQUATE_TO_COMPLETE_ADL: YES
BATHING_ASSISTANCE: STAND BY

## 2023-12-29 ASSESSMENT — COGNITIVE AND FUNCTIONAL STATUS - GENERAL
TOILETING: A LITTLE
TURNING FROM BACK TO SIDE WHILE IN FLAT BAD: A LITTLE
MOVING FROM LYING ON BACK TO SITTING ON SIDE OF FLAT BED WITH BEDRAILS: A LITTLE
STANDING UP FROM CHAIR USING ARMS: A LOT
PATIENT BASELINE BEDBOUND: NO
MOVING FROM LYING ON BACK TO SITTING ON SIDE OF FLAT BED WITH BEDRAILS: A LITTLE
MOVING TO AND FROM BED TO CHAIR: A LITTLE
CLIMB 3 TO 5 STEPS WITH RAILING: A LOT
TURNING FROM BACK TO SIDE WHILE IN FLAT BAD: A LITTLE
TOILETING: A LITTLE
DAILY ACTIVITIY SCORE: 21
WALKING IN HOSPITAL ROOM: A LOT
HELP NEEDED FOR BATHING: A LITTLE
DAILY ACTIVITIY SCORE: 19
PERSONAL GROOMING: A LITTLE
MOVING TO AND FROM BED TO CHAIR: A LOT
MOBILITY SCORE: 18
DRESSING REGULAR LOWER BODY CLOTHING: A LITTLE
HELP NEEDED FOR BATHING: A LITTLE
WALKING IN HOSPITAL ROOM: A LITTLE
CLIMB 3 TO 5 STEPS WITH RAILING: A LITTLE
STANDING UP FROM CHAIR USING ARMS: A LITTLE
DRESSING REGULAR LOWER BODY CLOTHING: A LITTLE
MOBILITY SCORE: 14
DRESSING REGULAR UPPER BODY CLOTHING: A LITTLE

## 2023-12-29 ASSESSMENT — PAIN - FUNCTIONAL ASSESSMENT
PAIN_FUNCTIONAL_ASSESSMENT: 0-10

## 2023-12-29 ASSESSMENT — COLUMBIA-SUICIDE SEVERITY RATING SCALE - C-SSRS
2. HAVE YOU ACTUALLY HAD ANY THOUGHTS OF KILLING YOURSELF?: NO
6. HAVE YOU EVER DONE ANYTHING, STARTED TO DO ANYTHING, OR PREPARED TO DO ANYTHING TO END YOUR LIFE?: NO
1. IN THE PAST MONTH, HAVE YOU WISHED YOU WERE DEAD OR WISHED YOU COULD GO TO SLEEP AND NOT WAKE UP?: NO

## 2023-12-29 ASSESSMENT — PAIN DESCRIPTION - LOCATION
LOCATION: LEG
LOCATION: GROIN

## 2023-12-29 ASSESSMENT — PAIN DESCRIPTION - ORIENTATION: ORIENTATION: RIGHT

## 2023-12-29 NOTE — PROGRESS NOTES
Physical Therapy    Physical Therapy Evaluation    Patient Name: Roby Qureshi  MRN: [id number]  Today's Date: 12/29/2023   Time Calculation  Start Time: 0951  Stop Time: 1005  Time Calculation (min): 14 min    Assessment/Plan   PT Assessment  PT Assessment Results: Decreased strength, Decreased endurance, Impaired balance, Decreased mobility, Decreased cognition, Impaired judgement, Decreased safety awareness, Pain  Rehab Prognosis: Fair  Evaluation/Treatment Tolerance: Patient limited by pain, Treatment limited secondary to agitation  Medical Staff Made Aware: Yes  Strengths: Support of Caregivers, Housing layout  Barriers to Participation: Ability to acquire knowledge, Attitude of self, Insight into problems  End of Session Communication: Bedside nurse, Care Coordinator  Assessment Comment: PT eval complete. Pt appears near functional baseline as compared to mobility from last hospital admission. Would benefit from use of own rollator, not currently at hospital. Needing continued PT for addressing functional deficits, anticipate limited progression due to cognitive challenges.  End of Session Patient Position: Bed, 2 rail up, Alarm on  IP OR SWING BED PT PLAN  Inpatient or Swing Bed: Inpatient  PT Plan  Treatment/Interventions: Bed mobility, Transfer training, Gait training, Balance training, Neuromuscular re-education, Strengthening, Endurance training, Therapeutic exercise, Therapeutic activity, Home exercise program, Positioning  PT Plan: Skilled PT  PT Frequency: 3 times per week  PT Discharge Recommendations: Low intensity level of continued care  Equipment Recommended upon Discharge: Wheeled walker  PT Recommended Transfer Status: Assist x1  PT - OK to Discharge: Yes (Per PT POC)      Subjective   General Visit Information:  General  Reason for Referral: Impaired mobility, gait training, impaired cognition  Referred By: MD Americo  Past Medical History Relevant to Rehab:   Past Medical History:  "  Diagnosis Date    Personal history of other diseases of the respiratory system 09/14/2020    History of acute bronchitis    Unspecified fall, initial encounter 12/02/2021    Accidental fall, initial encounter     Past Surgical History:   Procedure Laterality Date    OTHER SURGICAL HISTORY  01/08/2020    Spinal surgery    OTHER SURGICAL HISTORY  01/08/2020    Hysterectomy    OTHER SURGICAL HISTORY  01/08/2020    Femur fracture repair       Prior to Session Communication: Care Coordinator  Patient Position Received: Bed, 3 rail up, Alarm on  Preferred Learning Style: verbal, visual  General Comment: Pt supine in bed when PT entered, very tearful, reporting needing to use restroom, frustrated with continued diarrhea and reported pain.  Home Living:  Home Living  Home Living Comments: Home Living Type of Home: Apartment (Senior living apartments) Lives With: Alone Home Adaptive Equipment:  (Rollator) Home Layout: One level Home Access: Elevator Home Living Comments: Pt reports home setup appropriate for needs Prior Level of Function: Prior Function Per Pt/Caregiver Report Level of [place]: Independent with ADLs and functional transfers, Independent with homemaking with ambulation ADL Assistance: Independent Homemaking Assistance: Independent Ambulatory Assistance:  (Use of rollator at baseline, denies falls.) Prior Function Comments: Reports MOD I /c ADLs and IADLs, but more difficult. Has assist for laundry and senior living facility makes meals, but pt does not enjoy their food. Has transport via facility as well.  Prior Level of Function:     Precautions:  Precautions  Medical Precautions: Fall precautions  Precautions Comment: Occasional Confusion; Contact isolation - suspected C.Diff  Vital Signs:       Objective   Pain:  Pain Assessment  Pain Assessment: 0-10  Pain Score: 10 - Worst possible pain  Pain Type: Chronic pain  Pain Location: Back (and \"my crotch\")  Cognition:  Cognition  Overall Cognitive " Status: Impaired  Arousal/Alertness: Appropriate responses to stimuli  Orientation Level: Disoriented to place, Disoriented to person (Does not know why was brought to hospital, not sure of date)  Following Commands: Follows multistep commands with increased time  Safety Judgment: Decreased awareness of need for safety precautions  Problem Solving: Assistance required to generate solutions  Attention: Exceptions to [facility]  Alternating Attention: Impaired  Divided Attention: Impaired  Selective Attention: Impaired  Sustained Attention: Impaired  Memory: Exceptions to [facility]  Long-Term Memory: Impaired  Short-Term Memory: Impaired  Working Memory: Impaired  Problem Solving: Exceptions to [facility]  Complex Functional Tasks: Impaired  Verbal Reasoning Skills: Impaired  Abstract Reasoning: Exceptions to [facility]  Abstract Thinking Not Consistent: Moderate  Safety/Judgement: Exceptions to [facility]  Complex Functional Tasks: Moderate  Novel Situations: Moderate  Routine Tasks: Minimal  Unable to Self-Monitor and Self-Correct Consistently: Moderate  Insight: Severe  Impulsive: Moderately  Task Initiation: Initiates with cues  Planning: Reduced planning skills  Organization: Moderately disorganized    General Assessments:                  Activity Tolerance  Endurance: Tolerates 10 - 20 min exercise with multiple rests    Sensation  Light Touch: No apparent deficits    Strength  Strength Comments: Needing minimal true hands on assist, per last evaluation and noted current mobility, likely near functional baseline, would benefit from own rollator.  Strength  Strength Comments: Needing minimal true hands on assist, per last evaluation and noted current mobility, likely near functional baseline, would benefit from own rollator.    Perception  Inattention/Neglect:  (Easily distracted)  Initiation: Cues to initiate tasks  Motor Planning: Cues to use objects appropriately  Perseveration: Perseverates during conversation      Coordination  Movements  are Fluid and Coordinated: Yes    Postural Control  Postural Control: Within Functional Limits  Head Control: WFL  Trunk Control: Tends towards retropulsion    Static Sitting Balance  Static Sitting-Balance Support: Feet supported, Right upper extremity supported  Static Sitting-Level of Assistance: Close supervision  Dynamic Sitting Balance  Dynamic Sitting-Balance Support: Feet supported, Bilateral upper extremity supported  Dynamic Sitting-Balance:  (Scooting, leaning and reaching at EOB)    Static Standing Balance  Static Standing-Balance Support: Bilateral upper extremity supported  Static Standing-Level of Assistance: Minimum assistance  Static Standing-Comment/Number of Minutes: Use of RUE on bedrails, Min Ax1 on LUE  Dynamic Standing Balance  Dynamic Standing-Balance Support: Bilateral upper extremity supported  Dynamic Standing-Balance: Turning (stand pivot to BSC)  Functional Assessments:  Bed Mobility  Bed Mobility: Yes  Bed Mobility 1  Bed Mobility 1: Supine to sitting, Sitting to supine  Level of Assistance 1: Close supervision  Bed Mobility Comments 1: No hands on assist, use of bedrails, needing rest break s/p getting to EOB    Transfers  Transfer: Yes  Transfer 1  Technique 1: Sit to stand, Stand to sit  Transfer Level of Assistance 1: Hand held assistance, Minimum assistance, Moderate verbal cues  Trials/Comments 1: Initial trial, pt reaching for PT for hands on assist for Min Ax1 for propulsion and balance.  Transfers 2  Technique 2: Stand pivot  Transfer Level of Assistance 2: Hand held assistance, Minimum assistance  Trials/Comments 2: Stand step pivot from bed to BSC, Min Ax1 for stabilization and balance throughout.  Transfers 3  Technique 3: Sit to stand, Stand to sit  Transfer Level of Assistance 3: Close supervision  Trials/Comments 3: During second trial, SBA, cues for use of bedrail and verbal cues, use of back of legs on BSC for stabilization, SBA back to bed.    Ambulation/Gait  Training  Ambulation/Gait Training Performed: Yes  Ambulation/Gait Training 1  Surface 1: Level tile  Assistance 1: Close supervision  Quality of Gait 1: Narrow base of support, Diminished heel strike, Shuffling gait, Forward flexed posture (No hands on assist for second trial, use of bedrail, anticipate better ambulation with pt own rollator, not currently at hospital.)  Comments/Distance (ft) 1: 3'x2    Stairs  Stairs: No (No stairs to perform at baseline due to elevators in apartment building.)  Extremity/Trunk Assessments:  RUE   RUE : Within Functional Limits  LUE   LUE: Within Functional Limits  RLE   RLE : Within Functional Limits  LLE   LLE : Within Functional Limits  Outcome Measures:  [facility] Basic Mobility  Turning from your back to your side while in a flat bed without using bedrails: A little  Moving from lying on your back to sitting on the side of a flat bed without using bedrails: A little  Moving to and from bed to chair (including a wheelchair): A little  Standing up from a chair using your arms (e.g. wheelchair or bedside chair): A little  To walk in hospital room: A little  Climbing 3-5 steps with railing: A little  Basic Mobility - Total Score: 18    Encounter Problems       Encounter Problems (Active)       Balance       STG - Maintains dynamic standing balance with upper extremity support At MOD I level, safely, no LOB        Start:  12/29/23    Expected End:  01/12/24       INTERVENTIONS:  1. Practice standing with minimal support.  2. Educate patient about standing tolerance.  3. Educate patient about independence with gait, transfers, and ADL's.  4. Educate patient about use of assistive device.  5. Educate patient about self-directed care.            Mobility       STG - Patient will ambulate >/= 75', device PRN, Mod I, no LOB         Start:  12/29/23    Expected End:  01/12/24            Endurance - Pt to tolerate >/= 20 minutes therex, theract, gait and/or NMR with </= 5 minutes of rest  breaks        Start:  12/29/23    Expected End:  01/12/24               Safety       Pt will verbalize and apply safety awareness and precautions 100% of time throughout entire session         Start:  12/29/23    Expected End:  01/12/24               Transfers       STG - Patient will perform bed mobility At MOD I level, safely, no LOB        Start:  12/29/23    Expected End:  01/12/24            STG - Patient will transfer sit to and from stand At MOD I level, safely, no LOB        Start:  12/29/23    Expected End:  01/12/24                   Education Documentation  Precautions, taught by Cullen Bey, PT at 12/29/2023 10:56 AM.  Learner: Patient  Readiness: Nonacceptance  Method: Explanation, Demonstration  Response: Needs Reinforcement, No Evidence of Learning    Body Mechanics, taught by Cullen Bey PT at 12/29/2023 10:56 AM.  Learner: Patient  Readiness: Nonacceptance  Method: Explanation, Demonstration  Response: Needs Reinforcement, No Evidence of Learning    Mobility Training, taught by Cullen Bey PT at 12/29/2023 10:56 AM.  Learner: Patient  Readiness: Nonacceptance  Method: Explanation, Demonstration  Response: Needs Reinforcement, No Evidence of Learning    Education Comments  No comments found.

## 2023-12-29 NOTE — PROGRESS NOTES
Pharmacy Medication History Review    Roby Qureshi is a 78 y.o. female admitted for Colitis. Pharmacy reviewed the patient's [id number] medications and allergies for accuracy.    The list below reflectives the updated PTA list. Please review each medication in order reconciliation for additional clarification and justification.  Medications Prior to Admission   Medication Sig Dispense Refill Last Dose    DULoxetine (Cymbalta) 60 mg DR capsule Take 1 tablet by mouth once daily in the morning.   2023    gabapentin (Neurontin) 400 mg capsule Take 3 capsules (1,200 mg) by mouth 3 times a day. 270 capsule 0 2023    hydrOXYzine HCL (Atarax) 50 mg tablet Take 2 tablets (100 mg) by mouth twice a day.   2023    ibuprofen 400 mg tablet Take 1 tablet (400 mg) by mouth every 6 hours.   2023    Lactobacillus acidophilus (PROBIOTIC ORAL) Take 1 capsule by mouth 2 times a day.   2023    methylcellulose, laxative, (CitruceL) 500 mg tablet Take 2 tablets (1,000 mg) by mouth once daily at bedtime.   Unknown    metoprolol succinate XL (Toprol-XL) 25 mg 24 hr tablet Take 1 tablet (25 mg) by mouth once daily.   2023    oxyCODONE-acetaminophen (Percocet) 5-325 mg tablet Take 1 tablet by mouth every 6 hours if needed for severe pain (7 - 10). 15 tablet 0 2023    [] predniSONE (Deltasone) 20 mg tablet Take 2 tablets (40 mg) by mouth once daily for 2 days. 4 tablet 0     topiramate (Topamax) 100 mg tablet TAKE 1 TABLET BY MOUTH EVERY MORNING AND 2 TABLETS AT NIGHT 90 tablet 6 2023    traMADol (Ultram) 50 mg tablet Take 1 tablet (50 mg) by mouth every 8 hours if needed for severe pain (7 - 10).       venlafaxine 150 mg 24 hr tablet Take 2 tablets (300 mg) by mouth.   2023    zolpidem (Ambien) 5 mg tablet Take 1 tablet (5 mg) by mouth as needed at bedtime for sleep for up to 7 days. 7 tablet 0         The list below reflectives the updated allergy list. Please  review each documented allergy for additional clarification and justification.  Allergies  Reviewed by Mirian Vargas RN on 12/29/2023        Severity Reactions Comments    Oxycodone Not Specified Itching             Below are additional concerns with the patient's PTA list.  .pta    Bindu Ramirez CPhT

## 2023-12-29 NOTE — NURSING NOTE
"Received call from Beatriz stating she was patients daughter. Caller referred to her as \"my mom.\"  It was later admitted that called is patients caregiver.   "
done

## 2023-12-29 NOTE — H&P
History Of Present Illness  Roby Qureshi is a 78 y.o. female with past medical history of lumbar pain, status post laminectomy, radiculopathy of the lower extremities presenting with severe back pain x 3 weeks, stated that she has multiple admission for similar symptoms, and will get treated with IV pain medication and discharge home, just to have symptoms recurred again.  Patient stated that she is a resident of [facility], and stated that her symptoms are limited her ADLs, and described as sharp pain in the low back, nonradiating, constant, continuous worse with exertion.  On review of systems, patient admits to muscle spasms, and denies chest pain, shortness of breath, nausea, vomiting, dizziness, constipation or diarrhea.  Of note, patient states that she follows Dr. Ema Smith for pain management, and planning on getting a spine stimulator, to palliate her symptoms which are refractory to epidural injections.  In the ED impression presented with altered mental status and SVT, in the 190s and /60, received adenosine with resolution of SVT.  At time of encounter patient heart rate was in the 70s, and monitor showed sinus rhythm on telemetry.  Past Medical History  Past Medical History:   Diagnosis Date    Personal history of other diseases of the respiratory system 09/14/2020    History of acute bronchitis    Unspecified fall, initial encounter 12/02/2021    Accidental fall, initial encounter       Surgical History  Past Surgical History:   Procedure Laterality Date    OTHER SURGICAL HISTORY  01/08/2020    Spinal surgery    OTHER SURGICAL HISTORY  01/08/2020    Hysterectomy    OTHER SURGICAL HISTORY  01/08/2020    Femur fracture repair        Social History  She reports that she has never smoked. She has never used smokeless tobacco. She reports that she does not currently use alcohol. She reports that she does not use drugs.    Family History  Family History   Problem Relation  Name Age of Onset    Other (Cardiac Disorder) Mother          CABG; Defribillator in place    Coronary artery disease Mother      Other (Cardiac Disorder) Father          CABG; Defribillator in place    Coronary artery disease Father      Heart attack Father  54    Ovarian cancer Maternal Grandmother      Alzheimer's disease Other Aunt         Allergies  Oxycodone    Review of Systems  A 10 point review of system was conducted with patient admitting to symptoms in HPI above, and denying any additional symptoms at this time.  Physical Exam   Constitutional: Elderly female, alert active, cooperative not in acute distress  Eyes: PERRLA, clear sclera  ENMT: Moist mucosal membranes, no exudate  Head / Neck: Atraumatic, normocephalic, supple neck, JVP not visualized  Lungs: Patent airways, CTABL  Heart: RRR, S1S2, no murmurs appreciated, palpable pulses in all extremities  GI: Soft, NT, ND, bowel sounds present in all quadrants  MSK: Moves all extremities freely with tenderness and limited ROM with rotation, sidebending flexion extension of the lumbar spine, no joint edema  Extremities: Intact x 4, no peripheral edema  : No Crouch catheter inserted  Breast: Deferred  Neurological: AAO x 3 to person, place and date, facial muscles symmetrical, sensation intact, strength 4/4, no acute focal neurological deficits appreciated  Psychological: Appropriate mood and behavior    Last Recorded Vitals  Blood pressure 101/63, pulse 79, resp. rate 18, SpO2 94 %.    Relevant Results    Scheduled medications  bisacodyl, 10 mg, rectal, Daily  docusate sodium, 100 mg, oral, q12h  [START ON 12/29/2023] DULoxetine, 60 mg, oral, q AM  enoxaparin, 40 mg, subcutaneous, q24h  gabapentin, 1,200 mg, oral, TID  hydrOXYzine HCL, 100 mg, oral, BID  methylcellulose (laxative), 2 tablet, oral, Nightly  metoprolol succinate XL, 25 mg, oral, Daily  [START ON 12/29/2023] pantoprazole, 40 mg, oral, Daily before breakfast   Or  [START ON 12/29/2023]  pantoprazole, 40 mg, intravenous, Daily before breakfast  topiramate, 100 mg, oral, Daily  [START ON 12/29/2023] venlafaxine, 300 mg, oral, Daily with breakfast      Continuous medications     PRN medications  PRN medications: acetaminophen **OR** acetaminophen **OR** acetaminophen, chlorzoxazone, hydrOXYzine HCL, ondansetron **OR** ondansetron, zolpidem    Assessment/Plan   Principal Problem:    Colitis  SVT    78 y.o. female with past medical history of lumbar pain, status post laminectomy, radiculopathy of the lower extremities, presenting with complaint of excruciating back pain.     SVT with heart rates in the 190s: Unclear etiology, possible medication induced  -Status post adenosine infusion in the ED with termination of arrhythmia  -Urine drug screen positive for opiate  -Magnesium slightly up, repeat in the a.m. to reassess  -TSH level check  -Telemetry monitoring  -Cardiology consult for review, may need a Zio patch at discharge     Chronic back pain: Presenting with excruciating sciatica pain  -History of multiple surgeries and epidural injections  -States following the surgeon for spinal nerve stimulation  -Dilaudid 0.4 mg IV push every 4 hours as needed severe pain  -Percocet 5-325 mg 1 tablet every 6 hours as needed breakthrough pain  -Cyclobenzaprine 10 mg 3 times daily as needed muscle spasm  -Continue duloxetine 60 mg daily  -Gabapentin 800 mg daily  -Pain management consulted: Impression pending  -PT OT: Impression pending     History of arrhythmia  -On metoprolol 25 mg daily     Depression  -Venlafaxine  mg daily  -Topiramate 100 mg daily, also off label use for pain management     Insomnia  -Zolpidem 5 mg at bedtime (caution with w[place]ka return to use with opiate)     Diet  -Regular     DVT prophylaxis  -Lovenox 40 mg subcu daily     CODE STATUS: Full     Disposition: Presenting with chronic low back pain, need further evaluation and management, discharge pending pain management evaluation  and recommendation, may need SNF if meets PT OT criteria for, otherwise will be discharged home with home health and PT OT and follow-up with pain management       I spent 55 minutes in the professional and overall care of this patient.      Bandar Soliman,

## 2023-12-29 NOTE — CARE PLAN
The patient's goals for the shift include      The clinical goals for the shift include comfort    Problem: Pain  Goal: Takes deep breaths with improved pain control throughout the shift  Outcome: Progressing  Goal: Turns in bed with improved pain control throughout the shift  Outcome: Progressing  Goal: Walks with improved pain control throughout the shift  Outcome: Progressing  Goal: Performs ADL's with improved pain control throughout shift  Outcome: Progressing  Goal: Participates in PT with improved pain control throughout the shift  Outcome: Progressing  Goal: Free from opioid side effects throughout the shift  Outcome: Progressing  Goal: Free from acute confusion related to pain meds throughout the shift  Outcome: Progressing     Problem: Pain - Adult  Goal: Verbalizes/displays adequate comfort level or baseline comfort level  Outcome: Progressing

## 2023-12-29 NOTE — PROGRESS NOTES
Occupational Therapy    Evaluation    Patient Name: Roby Qureshi  MRN: [id number]  Today's Date: 12/29/2023  Time Calculation  Start Time: 1119  Stop Time: 1139  Time Calculation (min): 20 min    Assessment  IP OT Assessment  OT Assessment: Pt seen for OT eval. Pt demonstrates with intermittent confusion, assist with mobility and ADLS. Pt would benefit from low intensity therapy  Prognosis: Good  Barriers to Discharge: None  Evaluation/Treatment Tolerance: Patient limited by fatigue  End of Session Communication: Bedside nurse  End of Session Patient Position: Bed, 2 rail up, Alarm on  Plan:  Equipment Recommended upon Discharge: Wheeled walker  OT - OK to Discharge: Yes    Subjective   Current Problem:  1. SVT (supraventricular tachycardia)        2. Colitis        3. Hypotension, unspecified hypotension type        4. Altered mental status, unspecified altered mental status type        5. VERO (acute kidney injury) (CMS/[facility])          General:  General  Reason for Referral: Impaired mobility, gait training, impaired cognition  Referred By: MD Americo  Prior to Session Communication: Bedside nurse  Patient Position Received: Bed, 3 rail up, Alarm on  Preferred Learning Style: verbal, visual  General Comment: Pt supine in bed. Agreeable with OT eval  Precautions:  Medical Precautions: Fall precautions  Precautions Comment: zC diff precautions, intermittent confusion  Vital Signs:     Pain:  Pain Assessment  Pain Assessment: 0-10  Pain Score: 5 - Moderate pain  Pain Type: Acute pain  Pain Location: Back  Pain Interventions: Repositioned, Ambulation/increased activity, Distraction (per pt given medication prior to OT arrival)    Objective   Cognition:  Overall Cognitive Status:  (Intermittent confusion. Per chart pt distraught with PT. Pt calm and able to work through problem solving with OT)  Arousal/Alertness: Appropriate responses to stimuli  Orientation Level: Oriented X4  Following Commands: Follows  multistep commands with increased time  Safety Judgment: Decreased awareness of need for assistance  Attention: Exceptions to WFL (easily distracted)  Alternating Attention: Impaired  Memory: Exceptions to WFL  Long-Term Memory: Impaired (mild decrease)  Short-Term Memory: Impaired (mild decrease)  Problem Solving: Exceptions to WFL  Complex Functional Tasks: Impaired  Abstract Reasoning: Within Functional Limits  Abstract Thinking Not Consistent: Minimal  Safety/Judgement: Exceptions to WFL  Novel Situations: Minimal  Routine Tasks: Minimal  Insight: Moderate  Impulsive: Mildly  Planning: Reduced planning skills  Organization: Within functional limits           Home Living:  Type of Home: Apartment (senior living)  Lives With: Alone  Home Adaptive Equipment:  (rollator)  Home Layout: One level, Other (Comment) (3rd floor apt elevator access)  Home Access: Elevator  Bathroom Shower/Tub: Walk-in shower  Bathroom Toilet: Handicapped height  Bathroom Equipment: Grab bars in shower, Built-in shower seat  Home Living Comments: Pt lives alone in sr living apt. Pt repots she is indpendent with ADLS. Per pt has assist with laundry and facility   Prior Function:  Level of [place]: Independent with ADLs and functional transfers, Independent with homemaking with ambulation  ADL Assistance: Independent  Homemaking Assistance: Independent  Prior Function Comments: assisyt with laundry and meals  IADL History:  Homemaking Responsibilities: Yes  Laundry Responsibility: No  IADL Comments: per pt she has meals and laundry provided. per chart facility cleaned apt while pt admitted  ADL:  Eating Assistance: Independent  Grooming Assistance: Independent  Bathing Assistance: Stand by  UE Dressing Assistance: Independent  LE Dressing Assistance: Stand by  Activity Tolerance:  Endurance: Tolerates 10 - 20 min exercise with multiple rests  Bed Mobility/Transfers: Bed Mobility  Bed Mobility: Yes  Bed Mobility 1  Bed Mobility 1: Supine  to sitting, Sitting to supine  Level of Assistance 1: Close supervision, Minimal verbal cues, Minimal tactile cues  Bed Mobility Comments 1: pt utilized bedrail to push up to sitting reports shehas a rail she uses at home    Transfers  Transfer: Yes  Transfer 1  Technique 1: Sit to stand, Stand to sit  Transfer Level of Assistance 1: Hand held assistance, Contact guard  Trials/Comments 1: pt uses rollator for mobility at home min assist sarah OT to ambulate 3 feet x2     Sitting Balance:  Static Sitting Balance  Static Sitting-Balance Support: Feet supported  Static Sitting-Level of Assistance: Independent    IADL's:   Homemaking Responsibilities: Yes  Laundry Responsibility: No  IADL Comments: per pt she has meals and laundry provided. per chart facility cleaned apt while pt admitted  Vision:    Sensation:  Light Touch: No apparent deficits  Strength:  Strength Comments: B UE strength WFL  Perception:     Coordination:  Movements are Fluid and Coordinated: Yes  Finger to Nose: Intact  Coordination Comment: B UE coordination WFL   Hand Function:     Extremities: RUE   RUE : Within Functional Limits and LUE   LUE: Within Functional Limits      Outcome Measures: [facility] Daily Activity  Putting on and taking off regular lower body clothing: A little  Bathing (including washing, rinsing, drying): A little  Putting on and taking off regular upper body clothing: None  Toileting, which includes using toilet, bedpan or urinal: A little  Taking care of personal grooming such as brushing teeth: None  Eating Meals: None  Daily Activity - Total Score: 21      Education Documentation  Handouts, taught by Kacy Beckford OT at 12/29/2023  2:38 PM.  Learner: Patient  Readiness: Acceptance  Method: Explanation  Response: Verbalizes Understanding, Needs Reinforcement    ADL Training, taught by Kacy Beckford OT at 12/29/2023  2:38 PM.  Learner: Patient  Readiness: Acceptance  Method: Explanation  Response: Verbalizes  Understanding, Needs Reinforcement    Education Comments  No comments found.      Goals:   Encounter Problems       Encounter Problems (Active)       ADLs       Patient will perform LB bathing 100% with modified independent level of assistance and adaptive equipment prn . (Progressing)       Start:  12/29/23    Expected End:  01/12/24            Patient with complete lower body dressing with modified independent level of assistance donning and doffing all LE clothes  with PRN adaptive equipment while supported sitting (Progressing)       Start:  12/29/23    Expected End:  01/12/24                 COGNITION/SAFETY       Pt will recall events of tx session with 90% accuracy with cues prn  (Progressing)       Start:  12/29/23    Expected End:  01/12/24               MOBILITY       Patient will perform Functional mobility max Household distances/Community Distances with modified independent level of assistance and least restrictive device in order to improve safety and functional mobility. (Progressing)       Start:  12/29/23    Expected End:  01/12/24                 Safety       Pt will verbalize and apply safety awareness and precautions 100% of time throughout entire session         Start:  12/29/23    Expected End:  01/12/24               TRANSFERS       Patient will complete functional transfer to 100% with least restrictive device with modified independent level of assistance. (Progressing)       Start:  12/29/23    Expected End:  01/12/24

## 2023-12-29 NOTE — CARE PLAN
Pt  signed HPOA paperwork. It  was emailed to   daughter and her   at     [employer] <[email or web address]>     Woman <[email or web address]>    Pt given hard copy. Hard copy put in chart.     Per MD  pt is  ready  for dc. SW called dtr to let her know - left  message that she  can   call us back if she  wants.       SW  emailed her info  about the  dc.     Pt is A+Ox4. Pt  able to say she is  ready  for dc, wants [facility]. Emma  at  [facility] notified. [facility] to  request.       Pt  wants wheelchair  van  at  [facility]. Nurse told.     Janeen Claudio, MSW, LSW

## 2023-12-29 NOTE — PROGRESS NOTES
Witnessed patient sign HPOA paperwork with CRISTOPHER hernandez.  Janeen working with daughter to get patient placed in a more appropriate setting. Will continue to follow for discharge planning needs.

## 2023-12-29 NOTE — CONSULTS
Inpatient consult to Cardiology  Consult performed by: Cherry Tang, ALLAN-CNP  Consult ordered by: Bandar Soliman DO  Reason for consult: SVT          History Of Present Illness:    Roby Qureshi is a 78 y.o. female with a past medical history of chronic back pain, SVT, bipolar, spine surgery, hysterectomy, presents with hypotension, diarrhea and tachycardia.  A well check was done on patient and she was reportedly found disoriented with scattered pill bottles of Percocet and Cymbalta in her apartment>so EMS was called to further evaluate> though she did not want to come to the ED the decision was made to bring her in for further medical evaluation. Initial EKG reveals SVT heart rate 185, CT of abdomen and pelvis without contrast revealed mild colitis, cholelithiasis, hepatic steatosis, CXR reveals no acute cardiopulmonary processes, WBC 20.1, lactate 2.1, TSH 0.63, troponin 8, CR 1.19 urinalysis positive for opiates and oxycodone.  Initial SBP 60-80's.  Patient was medicated with adenosine 6 mg x 1 for SVT and converted to sinus tachycardia.  After fluid resuscitation and rate control, BP improved and repeated labs improved: wbc 12.0, cr 0.55, lactate 1.4.  Cardiology consulted for further evaluation of SVT.    Evaluated patient today here at [facility], patient reports that she is currently having groin, lower back and leg pain.  She reports that she has been having diarrhea intermittently for the last 3 years.  She states that she is upset that she is in the hospital and that she did not want to come.  She denies any complaints of racing heart rate, palpitations, chest pain, dyspnea, dizziness, lightheadedness, falls or syncopal events.  She admits that she was prescribed metoprolol succinate for her tachycardia, but takes the medication sporadically and last dose was over 1 week ago.    In addition, patient denies any past medical history of cardiac surgeries or coronary percutaneous interventions. She  "had a recent ED visit for SVT and was treated successfully with Adenosine. She is followed by pain management for her chronic back pain and states that she is due in about a week to have a pain pump implanted.    All other systems reviewed and negative unless as mentioned in HPI.    Past Medical/Surgical History:  Chronic back pain  SVT  Bipolar  Spine surgery  Hysterectomy     Social History:  Denies smoking, alcohol or illicit drug use    Family History:  Reviewed, not pertinent to presenting problem      Family History   Problem Relation Name Age of Onset    Other (Cardiac Disorder) Mother          CABG; Defribillator in place    Coronary artery disease Mother      Other (Cardiac Disorder) Father          CABG; Defribillator in place    Coronary artery disease Father      Heart attack Father  54    Ovarian cancer Maternal Grandmother      Alzheimer's disease Other Aunt         Allergies:  Oxycodone    ROS:  10 point review of systems including (Constitutional, Eyes, ENMT, Respiratory, Cardiac, Gastrointestinal, Neurological, Psychiatric, and Hematologic) was performed and is otherwise negative.    Objective Data:  Last Recorded Vitals:  Vitals:    23 0020 23 0106 23 0507 23 0735   BP: 105/62  110/61 110/68   BP Location: Left arm  Left arm    Patient Position: Lying  Lying    Pulse: 77  63 69   Resp: 18  18 17   Temp: 36.4 °C (97.5 °F)  37.5 °C (99.5 °F) 36.7 °C (98 °F)   TempSrc: Oral  Oral    SpO2: 94%  96% 96%   Weight:  64 kg (141 lb)     Height:  1.575 m (5' 2\")       Medical Gas Therapy: None (Room air)  Weight  Av kg (141 lb)  Min: 64 kg (141 lb)  Max: 64 kg (141 lb)      LABS:  CMP:  Results from last 7 days   Lab Units 23  0420 23  1027 23  0802 23  1444   SODIUM mmol/L 137  --  140 141   POTASSIUM mmol/L 3.6  --  3.6 3.7   CHLORIDE mmol/L 103  --  99 103   CO2 mmol/L 23  --  21 28   ANION GAP mmol/L 15  --  24* 14   BUN mg/dL 14  --  16 8   CREATININE " "mg/dL 0.55 0.83 1.19* 0.48*   EGFR mL/min/1.73m*2 >90 73 47* >90   MAGNESIUM mg/dL  --   --  2.70*  --    ALBUMIN g/dL  --   --  4.2 3.8   ALT U/L  --   --  12 11   AST U/L  --   --  17 13   BILIRUBIN TOTAL mg/dL  --   --  0.6 0.7     CBC:  Results from last 7 days   Lab Units 12/29/23  0420 12/28/23  0801 12/26/23  1444   WBC AUTO x10*3/uL 12.0* 20.1* 10.0   HEMOGLOBIN g/dL 12.0 15.1 13.4   HEMATOCRIT % 38.0 46.7* 41.3   PLATELETS AUTO x10*3/uL 296 399 332   MCV fL 94 92 91     COAG:   Results from last 7 days   Lab Units 12/26/23  1444   INR  1.0     ABO: No results found for: \"ABO\"  HEME/ENDO:  Results from last 7 days   Lab Units 12/29/23  0420   TSH mIU/L 0.63      CARDIAC:   Results from last 7 days   Lab Units 12/28/23  0801 12/26/23  1444   TROPHS ng/L  --  8   BNP pg/mL 72  --              Last I/O:    Intake/Output Summary (Last 24 hours) at 12/29/2023 0932  Last data filed at 12/28/2023 1741  Gross per 24 hour   Intake --   Output 400 ml   Net -400 ml     Net IO Since Admission: -400 mL [12/29/23 0932]      Imaging Results:  Electrocardiogram, 12-lead PRN ACS symptoms    Result Date: 12/29/2023  Sinus tachycardia Nonspecific ST abnormality Abnormal ECG When compared with ECG of 28-DEC-2023 07:44, (unconfirmed) Vent. rate has decreased BY  66 BPM ST no longer depressed in Inferior leads ST less depressed in Lateral leads    CT abdomen pelvis wo IV contrast    Result Date: 12/28/2023  STUDY: CT Abdomen and Pelvis without IV Contrast; 12/28/2023 1:17 PM. INDICATION: Sepsis, unknown origin. COMPARISON: CTA chest abdomen pelvis 12/16/2023, US pelvis 11/16/2023, CT abdomen pelvis 11/14/2023. ACCESSION NUMBER(S): [id number] ORDERING CLINICIAN: SPENCER REYNOLDS TECHNIQUE: CT of the abdomen and pelvis was performed.  Contiguous axial images were obtained at 3 mm slice thickness through the abdomen and pelvis. Coronal and sagittal reconstructions at 3 mm slice thickness were performed. No intravenous contrast " was administered. Automated mA/kV exposure control was utilized and patient examination was performed in strict accordance with principles of ALARA. FINDINGS: Please note that the evaluation of vessels, lymph nodes and organs is limited without intravenous contrast.  LOWER CHEST: No cardiomegaly.  No pericardial effusion.  Lung bases are clear.  ABDOMEN:  LIVER: No hepatomegaly.  Smooth surface contour.  Mild fatty infiltration of the liver.  BILE DUCTS: No intrahepatic or extrahepatic biliary ductal dilatation.  GALLBLADDER: The gallbladder is present with underlying gallstones. STOMACH: No abnormalities identified.  PANCREAS: No masses or ductal dilatation.  SPLEEN: No splenomegaly or focal splenic lesion.  ADRENAL GLANDS: No thickening or nodules.  KIDNEYS AND URETERS: Kidneys are normal in size and location.  No renal or ureteral calculi.  Stable 8 mm hypodensity in the lateral midpole left kidney.  PELVIS:  BLADDER: No abnormalities identified.  REPRODUCTIVE ORGANS: No abnormalities identified.  BOWEL: No abnormalities identified.  Appendix is normal.  Liquid stool noted throughout the colon.  VESSELS: No abnormalities identified.  Abdominal aorta is normal in caliber. Mild plaque along the distal aorta.  PERITONEUM/RETROPERITONEUM/LYMPH NODES: No free fluid.  No pneumoperitoneum. No lymphadenopathy.  ABDOMINAL WALL: No abnormalities identified. SOFT TISSUES: No abnormalities identified.  BONES: No acute fracture or aggressive osseous lesion.  Stable postsurgical change of the lumbar spine.    1. Liquid stool noted throughout the colon suggestive of a diarrheal state.  No other acute process.  Correlate clinically for possible mild colitis. 2. Cholelithiasis. 3. Mild hepatic steatosis. Signed by Jersey Cloud MD    ECG 12 lead    Result Date: 12/28/2023  Supraventricular tachycardia Incomplete right bundle branch block Marked ST abnormality, possible inferior subendocardial injury Abnormal ECG When compared  with ECG of 17-DEC-2023 14:51, Significant changes have occurred    XR chest 1 view    Result Date: 12/28/2023  STUDY: Chest Radiograph;  12/28/2023 9:02 AM. INDICATION: Supraventricular tachycardia. COMPARISON: CTA chest abdomen pelvis 12/16/2023, chest radiograph 12/14/2023. ACCESSION NUMBER(S): [id number] ORDERING CLINICIAN: SPENCER REYNOLDS TECHNIQUE:  Frontal chest was obtained at 09:01 hours. FINDINGS: CARDIOMEDIASTINAL SILHOUETTE: Cardiomediastinal silhouette is normal in size and configuration.  LUNGS: Lungs are clear.  ABDOMEN: No remarkable upper abdominal findings.  BONES: No acute osseous changes.  Posterior spinal fixation device noted.    No acute process. Signed by Jersey Cloud MD      Inpatient Medications:  Scheduled medications   Medication Dose Route Frequency    bisacodyl  10 mg rectal Daily    calcium polycarbophiL  625 mg oral Daily    docusate sodium  100 mg oral q12h    DULoxetine  60 mg oral q AM    enoxaparin  40 mg subcutaneous q24h    gabapentin  1,200 mg oral TID    hydrOXYzine HCL  100 mg oral BID    metoprolol succinate XL  25 mg oral Daily    pantoprazole  40 mg oral Daily before breakfast    Or    pantoprazole  40 mg intravenous Daily before breakfast    topiramate  100 mg oral Daily    venlafaxine XR  300 mg oral Daily with breakfast     PRN medications   Medication    acetaminophen    Or    acetaminophen    Or    acetaminophen    HYDROmorphone    ondansetron    Or    ondansetron    oxyCODONE-acetaminophen    zolpidem     Continuous Medications   Medication Dose Last Rate       Inpatient Medications:  Scheduled medications   Medication Dose Route Frequency    bisacodyl  10 mg rectal Daily    calcium polycarbophiL  625 mg oral Daily    docusate sodium  100 mg oral q12h    DULoxetine  60 mg oral q AM    enoxaparin  40 mg subcutaneous q24h    gabapentin  1,200 mg oral TID    hydrOXYzine HCL  100 mg oral BID    metoprolol succinate XL  25 mg oral Daily    pantoprazole  40 mg oral  Daily before breakfast    Or    pantoprazole  40 mg intravenous Daily before breakfast    topiramate  100 mg oral Daily    venlafaxine XR  300 mg oral Daily with breakfast     PRN medications   Medication    acetaminophen    Or    acetaminophen    Or    acetaminophen    HYDROmorphone    ondansetron    Or    ondansetron    oxyCODONE-acetaminophen    zolpidem     Continuous Medications   Medication Dose Last Rate     Outpatient Medications:  Current Outpatient Medications   Medication Instructions    bisacodyl (DULCOLAX) 10 mg, rectal, Daily    chlorzoxazone (Parafon Forte) 500 mg tablet 1 tablet, oral, 3 times daily PRN    docusate sodium (COLACE) 100 mg, oral, Every 12 hours    DULoxetine (Cymbalta) 60 mg DR capsule 1 tablet, oral, Every morning    gabapentin (NEURONTIN) 1,200 mg, oral, 3 times daily    hydrOXYzine HCL (Atarax) 50 mg tablet 2 tablets, oral, Nightly PRN    hydrOXYzine HCL (ATARAX) 100 mg, oral, 2 times daily    hydrOXYzine pamoate (VISTARIL) 25 mg, oral, Daily PRN    ibuprofen 400 mg tablet 1 tablet, oral, Every 6 hours    Lactobacillus acidophilus (PROBIOTIC ORAL) 1 capsule, oral, 2 times daily    methylcellulose, laxative, (CitruceL) 500 mg tablet 2 tablets, oral, Nightly    metoprolol succinate XL (Toprol-XL) 25 mg 24 hr tablet 1 tablet, oral, Daily    oxyCODONE-acetaminophen (Percocet) 5-325 mg tablet 1 tablet, oral, Every 6 hours PRN    tiZANidine (Zanaflex) 4 mg tablet 0.5-1 tablets, oral, 3 times daily    topiramate (Topamax) 100 mg tablet TAKE 1 TABLET BY MOUTH EVERY MORNING AND 2 TABLETS AT NIGHT    venlafaxine 300 mg, oral    zolpidem (AMBIEN) 5 mg, oral, Nightly PRN       Physical Exam:  General: Pleasant elderly female.  NAD  Neck:  No thyromegaly.  Normal Jugular Venous Pressure.  Cardiovascular:  Regular rate and rhythm.  No cardiac murmurs noted.  Normal S1 and S2.  Pulmonary:  Clear to auscultation bilaterally.  Abdomen:  Soft. Non-tender.   Non-distended.  Positive bowel  sounds.  Lower Extremities:  2+ pedal pulses. No LE edema.  Neurologic:  Cranial nerves intact.  No focal deficit.   Skin: Skin warm and dry, normal skin turgor.   Psychiatric: Normal affect.     Assessment/Plan   Roby Qureshi is a 78 y.o. female with a past medical history of chronic back pain, SVT, bipolar, spine surgery, hysterectomy, presents with hypotension, diarrhea and tachycardia.  A well check was done on patient and she was reportedly found disoriented with scattered pill bottles of Percocet and Cymbalta in her apartment>so EMS was called to further evaluate> though she did not want to come to the ED the decision was made to bring her in for further medical evaluation. Initial EKG reveals SVT heart rate 185, CT of abdomen and pelvis without contrast revealed mild colitis, cholelithiasis, hepatic steatosis, CXR reveals no acute cardiopulmonary processes, WBC 20.1, lactate 2.1, TSH 0.63, troponin 8, CR 1.19 urinalysis positive for opiates and oxycodone.  Initial SBP 60-80's.  Patient was medicated with adenosine 6 mg x 1 for SVT and converted to sinus tachycardia.  After fluid resuscitation and rate control, BP improved and repeated labs improved: wbc 12.0, cr 0.55, lactate 1.4.  Cardiology consulted for further evaluation of SVT.    I reviewed EKG, initial EKG reveals supraventricular tachycardia heart rate 185  Repeat EKG sinus tachycardia heart rate 119  I reviewed telemetry, 1 isolated episode of NSVT 5 beats> patient asymptomatic, currently sinus rhythm with rates in the 60-80's  I reviewed all labs and imaging reports      SVT.  Known history.  Patient asymptomatic  SVT in the setting of colitis/diarrhea, chronic pain syndrome, probable infection, noncompliance with BB  Continue metoprolol succinate 25 mg p.o. daily  Fluid resuscitation as needed  Monitor electrolytes, keep potassium>4.0 and mag > 2.0      Recommendations as above:  Hydrate, pain control, monitor and replete electrolytes as  needed, treat infectious process  Continue BB  No further orders from a cardiac perspective  No cardiac barriers to discharge  Cardiology will sign off  Please call with questions          Code Status:  Full Code          Cherry Tang, APRN-CNP

## 2023-12-30 VITALS
DIASTOLIC BLOOD PRESSURE: 58 MMHG | OXYGEN SATURATION: 98 % | TEMPERATURE: 97.5 F | HEART RATE: 66 BPM | WEIGHT: 141 LBS | SYSTOLIC BLOOD PRESSURE: 91 MMHG | RESPIRATION RATE: 18 BRPM | BODY MASS INDEX: 25.95 KG/M2 | HEIGHT: 62 IN

## 2023-12-30 LAB
ANION GAP SERPL CALC-SCNC: 13 MMOL/L (ref 10–20)
BUN SERPL-MCNC: 12 MG/DL (ref 6–23)
CALCIUM SERPL-MCNC: 8.5 MG/DL (ref 8.6–10.3)
CHLORIDE SERPL-SCNC: 104 MMOL/L (ref 98–107)
CO2 SERPL-SCNC: 26 MMOL/L (ref 21–32)
CREAT SERPL-MCNC: 0.63 MG/DL (ref 0.5–1.05)
ERYTHROCYTE [DISTWIDTH] IN BLOOD BY AUTOMATED COUNT: 16 % (ref 11.5–14.5)
GFR SERPL CREATININE-BSD FRML MDRD: >90 ML/MIN/1.73M*2
GLUCOSE SERPL-MCNC: 89 MG/DL (ref 74–99)
HCT VFR BLD AUTO: 37.8 % (ref 36–46)
HGB BLD-MCNC: 11.9 G/DL (ref 12–16)
MCH RBC QN AUTO: 29.6 PG (ref 26–34)
MCHC RBC AUTO-ENTMCNC: 31.5 G/DL (ref 32–36)
MCV RBC AUTO: 94 FL (ref 80–100)
NRBC BLD-RTO: 0 /100 WBCS (ref 0–0)
PLATELET # BLD AUTO: 255 X10*3/UL (ref 150–450)
POTASSIUM SERPL-SCNC: 3.5 MMOL/L (ref 3.5–5.3)
RBC # BLD AUTO: 4.02 X10*6/UL (ref 4–5.2)
SODIUM SERPL-SCNC: 139 MMOL/L (ref 136–145)
WBC # BLD AUTO: 12.3 X10*3/UL (ref 4.4–11.3)

## 2023-12-30 PROCEDURE — 96376 TX/PRO/DX INJ SAME DRUG ADON: CPT

## 2023-12-30 PROCEDURE — 85027 COMPLETE CBC AUTOMATED: CPT | Performed by: INTERNAL MEDICINE

## 2023-12-30 PROCEDURE — 36415 COLL VENOUS BLD VENIPUNCTURE: CPT | Performed by: INTERNAL MEDICINE

## 2023-12-30 PROCEDURE — G0378 HOSPITAL OBSERVATION PER HR: HCPCS

## 2023-12-30 PROCEDURE — 80048 BASIC METABOLIC PNL TOTAL CA: CPT | Performed by: INTERNAL MEDICINE

## 2023-12-30 PROCEDURE — 2500000004 HC RX 250 GENERAL PHARMACY W/ HCPCS (ALT 636 FOR OP/ED): Performed by: INTERNAL MEDICINE

## 2023-12-30 RX ADMIN — HYDROMORPHONE HYDROCHLORIDE 0.4 MG: 1 INJECTION, SOLUTION INTRAMUSCULAR; INTRAVENOUS; SUBCUTANEOUS at 06:37

## 2023-12-30 RX ADMIN — HYDROMORPHONE HYDROCHLORIDE 0.4 MG: 1 INJECTION, SOLUTION INTRAMUSCULAR; INTRAVENOUS; SUBCUTANEOUS at 01:37

## 2023-12-30 ASSESSMENT — PAIN SCALES - GENERAL
PAINLEVEL_OUTOF10: 7
PAINLEVEL_OUTOF10: 8

## 2023-12-30 NOTE — DISCHARGE SUMMARY
Discharge Diagnosis  Colitis    Issues Requiring Follow-Up  Follow-up with pain management as scheduled    Discharge Meds     Your medication list        CHANGE how you take these medications        Instructions Last Dose Given Next Dose Due   oxyCODONE-acetaminophen 5-325 mg tablet  Commonly known as: Percocet  What changed: Another medication with the same name was added. Make sure you understand how and when to take each.      Take 1 tablet by mouth every 6 hours if needed for severe pain (7 - 10).       oxyCODONE-acetaminophen 5-325 mg tablet  Commonly known as: Percocet  What changed: You were already taking a medication with the same name, and this prescription was added. Make sure you understand how and when to take each.      Take 1 tablet by mouth every 6 hours if needed for moderate pain (4 - 6) or severe pain (7 - 10).              CONTINUE taking these medications        Instructions Last Dose Given Next Dose Due   chlorzoxazone 500 mg tablet  Commonly known as: Parafon Forte           CitruceL 500 mg tablet  Generic drug: methylcellulose (laxative)           DULoxetine 60 mg DR capsule  Commonly known as: Cymbalta           gabapentin 400 mg capsule  Commonly known as: Neurontin      Take 3 capsules (1,200 mg) by mouth 3 times a day.       hydrOXYzine HCL 50 mg tablet  Commonly known as: Atarax           ibuprofen 400 mg tablet           metoprolol succinate XL 25 mg 24 hr tablet  Commonly known as: Toprol-XL           PROBIOTIC ORAL           tiZANidine 4 mg tablet  Commonly known as: Zanaflex           topiramate 100 mg tablet  Commonly known as: Topamax      TAKE 1 TABLET BY MOUTH EVERY MORNING AND 2 TABLETS AT NIGHT       traMADol 50 mg tablet  Commonly known as: Ultram           venlafaxine 150 mg 24 hr tablet           zolpidem 5 mg tablet  Commonly known as: Ambien      Take 1 tablet (5 mg) by mouth as needed at bedtime for sleep for up to 7 days.              STOP taking these medications       predniSONE 20 mg tablet  Commonly known as: Deltasone                  Where to Get Your Medications        These medications were sent to [facility]  [address] , [address], [facility] [address]      Hours: 8 AM to 6 PM Mon-Fri, 9 AM to 1 PM Saturday Phone: [phone]   oxyCODONE-acetaminophen 5-325 mg tablet         Test Results Pending At Discharge  Pending Labs       Order Current Status    Extra Urine Gray Tube Collected (12/28/23 7430)    Stool Pathogen Panel, PCR Collected (12/29/23 6847)    Urinalysis with Reflex Culture and Microscopic In process    Urine Culture In process    Blood Culture Preliminary result    Blood Culture Preliminary result            Hospital Course   Roby Qureshi is a 78 y.o. female with past medical history of lumbar pain, status post laminectomy, radiculopathy of the lower extremities presenting with severe back pain x 3 weeks, stated that she has multiple admission for similar symptoms, and will get treated with IV pain medication and discharge home, just to have symptoms recurred again.  Patient stated that she is a resident of [facility], and stated that her symptoms are limited her ADLs, and described as sharp pain in the low back, nonradiating, constant, continuous worse with exertion.  On review of systems, patient admits to muscle spasms, and denies chest pain, shortness of breath, nausea, vomiting, dizziness, constipation or diarrhea.  Of note, patient states that she follows Dr. Ema Smith for pain management, and planning on getting a spine stimulator, to palliate her symptoms which are refractory to epidural injections.  In the ED impression presented with altered mental status and SVT, in the 190s and /60, received adenosine with resolution of SVT.  At time of encounter patient heart rate was in the 70s, and monitor showed sinus rhythm on telemetry.    Patient was admitted for further management, reportedly mention  that she was having diarrhea in the ED, and she was put on precautions.  PCR was ordered, with negative result, needing to discontinue contact plus precautions.  Patient pain was controlled will Dilaudid 0.5 mg IV push every 4 hours as needed severe pain, and also Percocet 5-325 mg 1 tablet p.o. every 4 hours as needed moderate pain, and Tylenol 650 mg p.o. as needed mild pain.  She tolerated his symptoms throughout the day, pain management was consulted, but given appointment is within few days, it was discontinued.  Her SVT was resolved and didn't have any recurrent episode. Cardiology was consulted with no further workup necessary, metoprolol was continued.  PT OT, as well as social work were consulted.   Pt was treated with IV dilaudid for severe pain, which was transitioned her home dose percocet, prior to discharge, and given 3 days prescription as bridge to appointment with pain management. OARRS was reviewed and revealed a pattern of short course prescription for multiple discharges concerning with opiate dependence in setting with uncontrolled lumbar radiculopathy pain despite epidural injection. Discuss with patient consideration of buprenorphine regimen, to avoid repetitive ED visit and hospitalization, and with hope that such regimen will allow her to maintain a certain level of ADLs.     Pertinent Physical Exam At Time of Discharge  Physical Exam  Constitutional: Elderly female, alert active, cooperative not in acute distress  Eyes: PERRLA, clear sclera  ENMT: Moist mucosal membranes, no exudate  Head / Neck: Atraumatic, normocephalic, supple neck, JVP not visualized  Lungs: Patent airways, CTABL  Heart: RRR, S1S2, no murmurs appreciated, palpable pulses in all extremities  GI: Soft, NT, ND, bowel sounds present in all quadrants  MSK: Moves all extremities freely with tenderness and limited ROM with rotation, sidebending flexion extension of the lumbar spine, no joint edema  Extremities: Intact x 4, no  peripheral edema  : No Crouch catheter inserted  Breast: Deferred  Neurological: AAO x 3 to person, place and date, facial muscles symmetrical, sensation intact, strength 4/4, no acute focal neurological deficits appreciated  Psychological: Appropriate mood and behavior    Outpatient Follow-Up  Future Appointments   Date Time Provider Department Center   1/5/2024 11:15 AM Brook Smith MD [facility]   1/9/2024  2:40 PM Toi Esqueda MD [id number] [place],

## 2023-12-30 NOTE — CARE PLAN
The patient's goals for the shift include    Problem: Pain  Goal: Takes deep breaths with improved pain control throughout the shift  Outcome: Met  Goal: Turns in bed with improved pain control throughout the shift  Outcome: Met  Goal: Walks with improved pain control throughout the shift  Outcome: Progressing  Goal: Free from opioid side effects throughout the shift  Outcome: Progressing     Problem: Safety - Adult  Goal: Free from fall injury  Outcome: Met     Problem: Skin  Goal: Participates in plan/prevention/treatment measures  Outcome: Progressing  Flowsheets (Taken 12/30/2023 0238)  Participates in plan/prevention/treatment measures:   Elevate heels   Increase activity/out of bed for meals       The clinical goals for the shift include comfort

## 2024-01-01 LAB — BACTERIA BLD CULT: NORMAL

## 2024-01-02 ENCOUNTER — PATIENT OUTREACH (OUTPATIENT)
Dept: PRIMARY CARE | Facility: CLINIC | Age: 79
End: 2024-01-02
Payer: COMMERCIAL

## 2024-01-02 LAB — BACTERIA BLD CULT: NORMAL

## 2024-01-02 NOTE — PROGRESS NOTES
Discharge Facility: [facility]  Discharge Diagnosis: Colitis  Admission Date: 12-  Discharge Date: 12-    [facility] Referral - authorized  PT/OT    PCP Appointment Date: OUTREACH TO OFFICE TO SCHEDULE FU  Specialist Appointment Date:   -Pain Management  - Neurosurgery  1-9-2024    Hospital Encounter and Summary: Linked   Unsuccessful outreach / 2 attempts/ 2 messages

## 2024-01-05 ENCOUNTER — OFFICE VISIT (OUTPATIENT)
Dept: PAIN MEDICINE | Facility: HOSPITAL | Age: 79
End: 2024-01-05
Payer: COMMERCIAL

## 2024-01-05 DIAGNOSIS — G89.29 CHRONIC THORACIC BACK PAIN, UNSPECIFIED BACK PAIN LATERALITY: ICD-10-CM

## 2024-01-05 DIAGNOSIS — M54.6 CHRONIC THORACIC BACK PAIN, UNSPECIFIED BACK PAIN LATERALITY: ICD-10-CM

## 2024-01-05 DIAGNOSIS — M54.12 CERVICAL RADICULITIS: ICD-10-CM

## 2024-01-05 DIAGNOSIS — S32.009K LUMBAR PSEUDOARTHROSIS: ICD-10-CM

## 2024-01-05 PROCEDURE — 99214 OFFICE O/P EST MOD 30 MIN: CPT | Performed by: ANESTHESIOLOGY

## 2024-01-05 PROCEDURE — 1111F DSCHRG MED/CURRENT MED MERGE: CPT | Performed by: ANESTHESIOLOGY

## 2024-01-05 PROCEDURE — 1125F AMNT PAIN NOTED PAIN PRSNT: CPT | Performed by: ANESTHESIOLOGY

## 2024-01-05 PROCEDURE — 1036F TOBACCO NON-USER: CPT | Performed by: ANESTHESIOLOGY

## 2024-01-05 RX ORDER — METHYLPREDNISOLONE 4 MG/1
TABLET ORAL
Qty: 21 TABLET | Refills: 0 | Status: SHIPPED | OUTPATIENT
Start: 2024-01-05 | End: 2024-01-24 | Stop reason: HOSPADM

## 2024-01-05 ASSESSMENT — ENCOUNTER SYMPTOMS
VOMITING: 0
EYES NEGATIVE: 1
BACK PAIN: 1
CHEST TIGHTNESS: 0
ACTIVITY CHANGE: 1
PSYCHIATRIC NEGATIVE: 1
PALPITATIONS: 0
SHORTNESS OF BREATH: 0
NUMBNESS: 1
NAUSEA: 0
WEAKNESS: 1
HEMATOLOGIC/LYMPHATIC NEGATIVE: 1
JOINT SWELLING: 0
CHILLS: 0
FEVER: 0

## 2024-01-05 ASSESSMENT — PAIN SCALES - GENERAL: PAINLEVEL: 10-WORST PAIN EVER

## 2024-01-05 NOTE — PROGRESS NOTES
Chief Complaint   Patient presents with    Back Pain    Leg Pain     Subjective   Patient ID: Roby Qureshi is a 78 y.o. female.      78-year-old female with a history of chronic low back pain status post complex spine surgeries including L2 the sacrum gentle fusion at L5-S1 ALIF with revision of T10 to ilium instrumented fusion (2010) with Dr. Cardenas.  Recently she would get injections that would last a few months, 3-4 but more recently she has had intractable low back and right leg pain that has not gotten any relief following treatment.  The most recent injection for her the right leg symptoms was done in November of last year which she says did not give her any lasting relief.      Patient states that the pain has gotten much worse in the last 4 weeks and is located in the right lower lumbar region.  The pain radiates down her right leg more than the left.  Patient also states that she has pain that wraps from the back around the belly to the umbilicus.  The pain is described as sharp and achy and constant in nature.  Patient states the severity is 10/10.  Nothing is able to make the pain better or worse.  Patient is currently taking Topamax, gabapentin, duloxetine oxycodone 5 mg every 6.  Patient lives in a senior apartment and is able to ambulate with a walker.  Patient does state that her legs feel much weaker in the past couple of weeks.  Patient past has seen Dr. Baird for possible spinal cord stimulator placement--however when she saw her she was only having back pain and leg numbness which has been chronic since her surgery.  Over the past 4 weeks she has had severe and uncontrolled pain requiring multiple ER visits/hospital admissions.    Review of Systems   Constitutional:  Positive for activity change. Negative for chills and fever.   HENT: Negative.     Eyes: Negative.    Respiratory:  Negative for chest tightness and shortness of breath.    Cardiovascular:  Negative for chest pain and  palpitations.   Gastrointestinal:  Negative for nausea and vomiting.   Endocrine: Negative for cold intolerance and heat intolerance.   Genitourinary: Negative.    Musculoskeletal:  Positive for back pain. Negative for joint swelling.   Skin: Negative.    Neurological:  Positive for weakness and numbness.   Hematological: Negative.    Psychiatric/Behavioral: Negative.         Objective   Physical Exam  Constitutional:       Appearance: Normal appearance.   HENT:      Head: Normocephalic.      Nose: Nose normal.      Mouth/Throat:      Mouth: Mucous membranes are moist.   Eyes:      Pupils: Pupils are equal, round, and reactive to light.   Cardiovascular:      Rate and Rhythm: Normal rate and regular rhythm.   Pulmonary:      Effort: Pulmonary effort is normal.      Breath sounds: Normal breath sounds.   Abdominal:      Palpations: Abdomen is soft.   Musculoskeletal:         General: Normal range of motion.      Comments: Physical exam is limited due to patient being in wheelchair    5/5 motor strength bilateral extremity  Sensation intact bilateral lower extremity     Skin:     General: Skin is warm.   Neurological:      General: No focal deficit present.      Mental Status: She is alert and oriented to person, place, and time.   Psychiatric:         Mood and Affect: Mood normal.       Assessment/Plan     78-year-old female known to the office has undergone epidural steroid injections, caudal injections and has extensive spine surgery history with hardware placement.  Epidural steroid injections that previously helped provide pain relief for several months have stopped helping.  Most recent injections gave no meaningful relief.  About 4 weeks ago patient started to have much more intense pain that is radiating down the right leg as well as wrapping around to the umbilicus.  History, presentation, physical exam leads me to believe that this could be possible be due to adjacent segment disease.  Due to the patient's  symptoms progressing so quickly in 4 weeks to be best at this time to get a cervical, thoracic, lumbar MRI with metal suppression to evaluate.  Patient should continue taking her gabapentin, topiramate, Cymbalta during the meantime.    Patient already had CT imaging inpatient which did not show a surgical change and it was suggested that if her pain were to persist, MRIs would be more helpful in evaluating her etiology of pain.    Since the end of November she has had 9 ED visits and multiple hospital admissions for this intractable pain.  She has had several CTs including imaging of the entire spine but no MRI which has been suggested for further evaluation.    We had a really extensive discussion about treatment options but really about things we can do in the meantime but the patient is already taking essentially every type of medication that could be considered on an outpatient basis.  Nothing helps her pain.  The only thing that has helped is IV medication which obviously we cannot give as an outpatient.    Patient was also inquiring about spinal cord stimulation however with the significant change in pain and neurologic symptoms, I would not recommend putting in a device until we know best what is going on.  Patient has had a significant change over the past month in terms of pain and the lack of response to conservative measures.  We discussed that spinal cord stimulation is typically only used when there is no surgical lesion.  Right now with her significant change in status, I would not recommend putting in a pain device at this time until workup has been completed.    Plan:  Cervical, thoracic, lumbar MRI with metal suppression.    Medrol Dosepak.  Side effect profile reviewed.    Advised to continue the rest of the medication she is taking but not to take any anti-inflammatories in conjunction with the Medrol Dosepak.    Patient was seen discussed Dr. Luis Osuna,  DO  [facility]  Anesthesiology      There are no diagnoses linked to this encounter.

## 2024-01-09 DIAGNOSIS — M54.16 RIGHT LUMBAR RADICULITIS: ICD-10-CM

## 2024-01-09 DIAGNOSIS — M54.10 RADICULOPATHY WITH LOWER EXTREMITY SYMPTOMS: ICD-10-CM

## 2024-01-14 ENCOUNTER — APPOINTMENT (OUTPATIENT)
Dept: RADIOLOGY | Facility: HOSPITAL | Age: 79
DRG: 494 | End: 2024-01-14
Payer: COMMERCIAL

## 2024-01-14 ENCOUNTER — HOSPITAL ENCOUNTER (INPATIENT)
Facility: HOSPITAL | Age: 79
LOS: 9 days | Discharge: NURSING, REHAB OR LONG-TERM CARE | DRG: 494 | End: 2024-01-23
Attending: EMERGENCY MEDICINE | Admitting: INTERNAL MEDICINE
Payer: COMMERCIAL

## 2024-01-14 DIAGNOSIS — M54.9 BACK PAIN, UNSPECIFIED BACK LOCATION, UNSPECIFIED BACK PAIN LATERALITY, UNSPECIFIED CHRONICITY: ICD-10-CM

## 2024-01-14 DIAGNOSIS — S82.891A CLOSED FRACTURE OF RIGHT ANKLE, INITIAL ENCOUNTER: ICD-10-CM

## 2024-01-14 DIAGNOSIS — G89.29 CHRONIC RIGHT-SIDED LOW BACK PAIN WITH RIGHT-SIDED SCIATICA: ICD-10-CM

## 2024-01-14 DIAGNOSIS — M54.41 CHRONIC RIGHT-SIDED LOW BACK PAIN WITH RIGHT-SIDED SCIATICA: ICD-10-CM

## 2024-01-14 DIAGNOSIS — M54.9 ACUTE BACK PAIN, UNSPECIFIED BACK LOCATION, UNSPECIFIED BACK PAIN LATERALITY: Primary | ICD-10-CM

## 2024-01-14 PROBLEM — Z78.9 UNABLE TO CARE FOR SELF: Status: ACTIVE | Noted: 2024-01-14

## 2024-01-14 PROBLEM — R29.898 RIGHT LEG WEAKNESS: Status: ACTIVE | Noted: 2024-01-14

## 2024-01-14 PROBLEM — R26.2 UNABLE TO AMBULATE: Status: ACTIVE | Noted: 2024-01-14

## 2024-01-14 PROBLEM — S93.04XA ANKLE DISLOCATION, RIGHT, INITIAL ENCOUNTER: Status: ACTIVE | Noted: 2024-01-14

## 2024-01-14 PROBLEM — M54.16 ACUTE RIGHT LUMBAR RADICULOPATHY: Status: ACTIVE | Noted: 2024-01-14

## 2024-01-14 LAB
ALBUMIN SERPL BCP-MCNC: 3.5 G/DL (ref 3.4–5)
ALP SERPL-CCNC: 77 U/L (ref 33–136)
ALT SERPL W P-5'-P-CCNC: 11 U/L (ref 7–45)
ANION GAP SERPL CALC-SCNC: 14 MMOL/L (ref 10–20)
APPEARANCE UR: ABNORMAL
AST SERPL W P-5'-P-CCNC: 18 U/L (ref 9–39)
BACTERIA #/AREA URNS AUTO: ABNORMAL /HPF
BASOPHILS # BLD AUTO: 0.05 X10*3/UL (ref 0–0.1)
BASOPHILS NFR BLD AUTO: 0.4 %
BILIRUB SERPL-MCNC: 0.9 MG/DL (ref 0–1.2)
BILIRUB UR STRIP.AUTO-MCNC: NEGATIVE MG/DL
BUN SERPL-MCNC: 16 MG/DL (ref 6–23)
CALCIUM SERPL-MCNC: 8.7 MG/DL (ref 8.6–10.3)
CHLORIDE SERPL-SCNC: 104 MMOL/L (ref 98–107)
CO2 SERPL-SCNC: 24 MMOL/L (ref 21–32)
COLOR UR: ABNORMAL
CREAT SERPL-MCNC: 0.89 MG/DL (ref 0.5–1.05)
EGFRCR SERPLBLD CKD-EPI 2021: 67 ML/MIN/1.73M*2
EOSINOPHIL # BLD AUTO: 0.41 X10*3/UL (ref 0–0.4)
EOSINOPHIL NFR BLD AUTO: 3.6 %
ERYTHROCYTE [DISTWIDTH] IN BLOOD BY AUTOMATED COUNT: 15.5 % (ref 11.5–14.5)
GLUCOSE SERPL-MCNC: 98 MG/DL (ref 74–99)
GLUCOSE UR STRIP.AUTO-MCNC: NEGATIVE MG/DL
HCT VFR BLD AUTO: 37 % (ref 36–46)
HGB BLD-MCNC: 11.9 G/DL (ref 12–16)
IMM GRANULOCYTES # BLD AUTO: 0.05 X10*3/UL (ref 0–0.5)
IMM GRANULOCYTES NFR BLD AUTO: 0.4 % (ref 0–0.9)
KETONES UR STRIP.AUTO-MCNC: NEGATIVE MG/DL
LEUKOCYTE ESTERASE UR QL STRIP.AUTO: ABNORMAL
LYMPHOCYTES # BLD AUTO: 3.13 X10*3/UL (ref 0.8–3)
LYMPHOCYTES NFR BLD AUTO: 27.1 %
MCH RBC QN AUTO: 29.8 PG (ref 26–34)
MCHC RBC AUTO-ENTMCNC: 32.2 G/DL (ref 32–36)
MCV RBC AUTO: 93 FL (ref 80–100)
MONOCYTES # BLD AUTO: 1.15 X10*3/UL (ref 0.05–0.8)
MONOCYTES NFR BLD AUTO: 10 %
NEUTROPHILS # BLD AUTO: 6.74 X10*3/UL (ref 1.6–5.5)
NEUTROPHILS NFR BLD AUTO: 58.5 %
NITRITE UR QL STRIP.AUTO: NEGATIVE
NRBC BLD-RTO: 0 /100 WBCS (ref 0–0)
PH UR STRIP.AUTO: 8 [PH]
PLATELET # BLD AUTO: 256 X10*3/UL (ref 150–450)
POTASSIUM SERPL-SCNC: 3.5 MMOL/L (ref 3.5–5.3)
PROT SERPL-MCNC: 6.5 G/DL (ref 6.4–8.2)
PROT UR STRIP.AUTO-MCNC: ABNORMAL MG/DL
RBC # BLD AUTO: 3.99 X10*6/UL (ref 4–5.2)
RBC # UR STRIP.AUTO: NEGATIVE /UL
RBC #/AREA URNS AUTO: ABNORMAL /HPF
SODIUM SERPL-SCNC: 138 MMOL/L (ref 136–145)
SP GR UR STRIP.AUTO: 1.01
TRANS CELLS #/AREA UR COMP ASSIST: ABNORMAL /HPF
URATE CRY #/AREA UR COMP ASSIST: ABNORMAL /HPF
UROBILINOGEN UR STRIP.AUTO-MCNC: 2 MG/DL
WBC # BLD AUTO: 11.5 X10*3/UL (ref 4.4–11.3)
WBC #/AREA URNS AUTO: ABNORMAL /HPF

## 2024-01-14 PROCEDURE — 73620 X-RAY EXAM OF FOOT: CPT | Mod: RIGHT SIDE | Performed by: RADIOLOGY

## 2024-01-14 PROCEDURE — 2500000001 HC RX 250 WO HCPCS SELF ADMINISTERED DRUGS (ALT 637 FOR MEDICARE OP): Performed by: PHYSICIAN ASSISTANT

## 2024-01-14 PROCEDURE — 2500000004 HC RX 250 GENERAL PHARMACY W/ HCPCS (ALT 636 FOR OP/ED): Performed by: PHYSICIAN ASSISTANT

## 2024-01-14 PROCEDURE — 85025 COMPLETE CBC W/AUTO DIFF WBC: CPT | Performed by: EMERGENCY MEDICINE

## 2024-01-14 PROCEDURE — 1200000002 HC GENERAL ROOM WITH TELEMETRY DAILY

## 2024-01-14 PROCEDURE — 73610 X-RAY EXAM OF ANKLE: CPT | Mod: RT

## 2024-01-14 PROCEDURE — 94760 N-INVAS EAR/PLS OXIMETRY 1: CPT

## 2024-01-14 PROCEDURE — 0QSBXZZ REPOSITION RIGHT LOWER FEMUR, EXTERNAL APPROACH: ICD-10-PCS | Performed by: EMERGENCY MEDICINE

## 2024-01-14 PROCEDURE — 99152 MOD SED SAME PHYS/QHP 5/>YRS: CPT | Performed by: EMERGENCY MEDICINE

## 2024-01-14 PROCEDURE — 2500000005 HC RX 250 GENERAL PHARMACY W/O HCPCS: Performed by: EMERGENCY MEDICINE

## 2024-01-14 PROCEDURE — 87086 URINE CULTURE/COLONY COUNT: CPT | Mod: AHULAB | Performed by: EMERGENCY MEDICINE

## 2024-01-14 PROCEDURE — 80053 COMPREHEN METABOLIC PANEL: CPT | Performed by: EMERGENCY MEDICINE

## 2024-01-14 PROCEDURE — 36415 COLL VENOUS BLD VENIPUNCTURE: CPT | Performed by: EMERGENCY MEDICINE

## 2024-01-14 PROCEDURE — 73590 X-RAY EXAM OF LOWER LEG: CPT | Mod: RT

## 2024-01-14 PROCEDURE — 99223 1ST HOSP IP/OBS HIGH 75: CPT | Performed by: PHYSICIAN ASSISTANT

## 2024-01-14 PROCEDURE — 96374 THER/PROPH/DIAG INJ IV PUSH: CPT | Mod: 59

## 2024-01-14 PROCEDURE — 29515 APPLICATION SHORT LEG SPLINT: CPT | Performed by: EMERGENCY MEDICINE

## 2024-01-14 PROCEDURE — 2500000004 HC RX 250 GENERAL PHARMACY W/ HCPCS (ALT 636 FOR OP/ED): Performed by: EMERGENCY MEDICINE

## 2024-01-14 PROCEDURE — 73590 X-RAY EXAM OF LOWER LEG: CPT | Mod: RIGHT SIDE | Performed by: RADIOLOGY

## 2024-01-14 PROCEDURE — 73600 X-RAY EXAM OF ANKLE: CPT | Mod: RT

## 2024-01-14 PROCEDURE — 2500000001 HC RX 250 WO HCPCS SELF ADMINISTERED DRUGS (ALT 637 FOR MEDICARE OP): Performed by: HOSPITALIST

## 2024-01-14 PROCEDURE — 99285 EMERGENCY DEPT VISIT HI MDM: CPT | Performed by: EMERGENCY MEDICINE

## 2024-01-14 PROCEDURE — 81003 URINALYSIS AUTO W/O SCOPE: CPT | Performed by: EMERGENCY MEDICINE

## 2024-01-14 PROCEDURE — 96375 TX/PRO/DX INJ NEW DRUG ADDON: CPT | Mod: 59

## 2024-01-14 PROCEDURE — 73620 X-RAY EXAM OF FOOT: CPT | Mod: RT

## 2024-01-14 PROCEDURE — 73600 X-RAY EXAM OF ANKLE: CPT | Mod: RIGHT SIDE | Performed by: RADIOLOGY

## 2024-01-14 RX ORDER — FENTANYL CITRATE 50 UG/ML
50 INJECTION, SOLUTION INTRAMUSCULAR; INTRAVENOUS ONCE
Status: COMPLETED | OUTPATIENT
Start: 2024-01-14 | End: 2024-01-14

## 2024-01-14 RX ORDER — TOPIRAMATE 25 MG/1
200 TABLET ORAL NIGHTLY
Status: DISCONTINUED | OUTPATIENT
Start: 2024-01-14 | End: 2024-01-24 | Stop reason: HOSPADM

## 2024-01-14 RX ORDER — PROPOFOL 10 MG/ML
0.5 INJECTION, EMULSION INTRAVENOUS ONCE
Status: COMPLETED | OUTPATIENT
Start: 2024-01-14 | End: 2024-01-17

## 2024-01-14 RX ORDER — ACETAMINOPHEN 650 MG/1
650 SUPPOSITORY RECTAL EVERY 4 HOURS PRN
Status: DISCONTINUED | OUTPATIENT
Start: 2024-01-14 | End: 2024-01-24 | Stop reason: HOSPADM

## 2024-01-14 RX ORDER — PANTOPRAZOLE SODIUM 40 MG/1
40 TABLET, DELAYED RELEASE ORAL
Status: DISCONTINUED | OUTPATIENT
Start: 2024-01-15 | End: 2024-01-24 | Stop reason: HOSPADM

## 2024-01-14 RX ORDER — DOXEPIN HYDROCHLORIDE 100 MG/1
100 CAPSULE ORAL NIGHTLY PRN
COMMUNITY

## 2024-01-14 RX ORDER — ONDANSETRON HYDROCHLORIDE 2 MG/ML
4 INJECTION, SOLUTION INTRAVENOUS EVERY 8 HOURS PRN
Status: DISCONTINUED | OUTPATIENT
Start: 2024-01-14 | End: 2024-01-24 | Stop reason: HOSPADM

## 2024-01-14 RX ORDER — ONDANSETRON 4 MG/1
4 TABLET, ORALLY DISINTEGRATING ORAL EVERY 8 HOURS PRN
Status: DISCONTINUED | OUTPATIENT
Start: 2024-01-14 | End: 2024-01-24 | Stop reason: HOSPADM

## 2024-01-14 RX ORDER — TOPIRAMATE 100 MG/1
200 TABLET, FILM COATED ORAL NIGHTLY
COMMUNITY
End: 2024-01-24 | Stop reason: HOSPADM

## 2024-01-14 RX ORDER — ACETAMINOPHEN 325 MG/1
650 TABLET ORAL EVERY 4 HOURS PRN
Status: DISCONTINUED | OUTPATIENT
Start: 2024-01-14 | End: 2024-01-24 | Stop reason: HOSPADM

## 2024-01-14 RX ORDER — DOXEPIN HYDROCHLORIDE 50 MG/1
100 CAPSULE ORAL NIGHTLY PRN
Status: DISCONTINUED | OUTPATIENT
Start: 2024-01-14 | End: 2024-01-24 | Stop reason: HOSPADM

## 2024-01-14 RX ORDER — GABAPENTIN 400 MG/1
800 CAPSULE ORAL 3 TIMES DAILY
Status: DISCONTINUED | OUTPATIENT
Start: 2024-01-14 | End: 2024-01-24 | Stop reason: HOSPADM

## 2024-01-14 RX ORDER — METOPROLOL SUCCINATE 25 MG/1
25 TABLET, EXTENDED RELEASE ORAL DAILY
Status: DISCONTINUED | OUTPATIENT
Start: 2024-01-14 | End: 2024-01-24 | Stop reason: HOSPADM

## 2024-01-14 RX ORDER — OXYCODONE HYDROCHLORIDE 5 MG/1
5 TABLET ORAL EVERY 6 HOURS PRN
Status: DISCONTINUED | OUTPATIENT
Start: 2024-01-14 | End: 2024-01-20

## 2024-01-14 RX ORDER — ENOXAPARIN SODIUM 100 MG/ML
40 INJECTION SUBCUTANEOUS EVERY 24 HOURS
Status: DISCONTINUED | OUTPATIENT
Start: 2024-01-14 | End: 2024-01-24 | Stop reason: HOSPADM

## 2024-01-14 RX ORDER — ACETAMINOPHEN 160 MG/5ML
650 SOLUTION ORAL EVERY 4 HOURS PRN
Status: DISCONTINUED | OUTPATIENT
Start: 2024-01-14 | End: 2024-01-24 | Stop reason: HOSPADM

## 2024-01-14 RX ORDER — PANTOPRAZOLE SODIUM 40 MG/10ML
40 INJECTION, POWDER, LYOPHILIZED, FOR SOLUTION INTRAVENOUS
Status: DISCONTINUED | OUTPATIENT
Start: 2024-01-15 | End: 2024-01-24 | Stop reason: HOSPADM

## 2024-01-14 RX ORDER — TOPIRAMATE 25 MG/1
100 TABLET ORAL EVERY MORNING
Status: DISCONTINUED | OUTPATIENT
Start: 2024-01-15 | End: 2024-01-24 | Stop reason: HOSPADM

## 2024-01-14 RX ORDER — KETAMINE HYDROCHLORIDE 10 MG/ML
INJECTION, SOLUTION INTRAMUSCULAR; INTRAVENOUS CODE/TRAUMA/SEDATION MEDICATION
Status: COMPLETED | OUTPATIENT
Start: 2024-01-14 | End: 2024-01-14

## 2024-01-14 RX ORDER — KETAMINE HYDROCHLORIDE 50 MG/ML
0.5 INJECTION, SOLUTION INTRAMUSCULAR; INTRAVENOUS ONCE
Status: DISCONTINUED | OUTPATIENT
Start: 2024-01-14 | End: 2024-01-23

## 2024-01-14 RX ORDER — HYDROMORPHONE HYDROCHLORIDE 1 MG/ML
1 INJECTION, SOLUTION INTRAMUSCULAR; INTRAVENOUS; SUBCUTANEOUS EVERY 30 MIN PRN
Status: DISCONTINUED | OUTPATIENT
Start: 2024-01-14 | End: 2024-01-14

## 2024-01-14 RX ORDER — ONDANSETRON HYDROCHLORIDE 2 MG/ML
4 INJECTION, SOLUTION INTRAVENOUS EVERY 30 MIN PRN
Status: DISCONTINUED | OUTPATIENT
Start: 2024-01-14 | End: 2024-01-14

## 2024-01-14 RX ORDER — LIDOCAINE HYDROCHLORIDE 20 MG/ML
20 INJECTION, SOLUTION INFILTRATION; PERINEURAL ONCE
Status: COMPLETED | OUTPATIENT
Start: 2024-01-14 | End: 2024-01-14

## 2024-01-14 RX ADMIN — KETAMINE HYDROCHLORIDE 50 MG: 10 INJECTION, SOLUTION INTRAMUSCULAR; INTRAVENOUS at 14:20

## 2024-01-14 RX ADMIN — SODIUM CHLORIDE 1000 ML: 9 INJECTION, SOLUTION INTRAVENOUS at 12:11

## 2024-01-14 RX ADMIN — HYDROMORPHONE HYDROCHLORIDE 1 MG: 1 INJECTION, SOLUTION INTRAMUSCULAR; INTRAVENOUS; SUBCUTANEOUS at 12:11

## 2024-01-14 RX ADMIN — LIDOCAINE HYDROCHLORIDE 20 ML: 20 INJECTION, SOLUTION INFILTRATION; PERINEURAL at 15:00

## 2024-01-14 RX ADMIN — ENOXAPARIN SODIUM 40 MG: 40 INJECTION SUBCUTANEOUS at 21:55

## 2024-01-14 RX ADMIN — DOXEPIN HYDROCHLORIDE 100 MG: 50 CAPSULE ORAL at 22:02

## 2024-01-14 RX ADMIN — HYDROMORPHONE HYDROCHLORIDE 0.5 MG: 1 INJECTION, SOLUTION INTRAMUSCULAR; INTRAVENOUS; SUBCUTANEOUS at 20:15

## 2024-01-14 RX ADMIN — GABAPENTIN 800 MG: 400 CAPSULE ORAL at 21:55

## 2024-01-14 RX ADMIN — ACETAMINOPHEN 650 MG: 325 TABLET ORAL at 22:02

## 2024-01-14 RX ADMIN — FENTANYL CITRATE 50 MCG: 50 INJECTION INTRAMUSCULAR; INTRAVENOUS at 15:39

## 2024-01-14 RX ADMIN — ONDANSETRON 4 MG: 2 INJECTION INTRAMUSCULAR; INTRAVENOUS at 12:11

## 2024-01-14 RX ADMIN — OXYCODONE HYDROCHLORIDE 5 MG: 5 TABLET ORAL at 22:02

## 2024-01-14 SDOH — SOCIAL STABILITY: SOCIAL INSECURITY: ABUSE: ADULT

## 2024-01-14 SDOH — SOCIAL STABILITY: SOCIAL INSECURITY: ARE THERE ANY APPARENT SIGNS OF INJURIES/BEHAVIORS THAT COULD BE RELATED TO ABUSE/NEGLECT?: NO

## 2024-01-14 SDOH — SOCIAL STABILITY: SOCIAL INSECURITY: DOES ANYONE TRY TO KEEP YOU FROM HAVING/CONTACTING OTHER FRIENDS OR DOING THINGS OUTSIDE YOUR HOME?: NO

## 2024-01-14 SDOH — SOCIAL STABILITY: SOCIAL INSECURITY: HAS ANYONE EVER THREATENED TO HURT YOUR FAMILY OR YOUR PETS?: NO

## 2024-01-14 SDOH — SOCIAL STABILITY: SOCIAL INSECURITY: DO YOU FEEL UNSAFE GOING BACK TO THE PLACE WHERE YOU ARE LIVING?: NO

## 2024-01-14 SDOH — SOCIAL STABILITY: SOCIAL INSECURITY: ARE YOU OR HAVE YOU BEEN THREATENED OR ABUSED PHYSICALLY, EMOTIONALLY, OR SEXUALLY BY ANYONE?: NO

## 2024-01-14 SDOH — SOCIAL STABILITY: SOCIAL INSECURITY: WERE YOU ABLE TO COMPLETE ALL THE BEHAVIORAL HEALTH SCREENINGS?: YES

## 2024-01-14 SDOH — SOCIAL STABILITY: SOCIAL INSECURITY: HAVE YOU HAD THOUGHTS OF HARMING ANYONE ELSE?: YES

## 2024-01-14 SDOH — SOCIAL STABILITY: SOCIAL INSECURITY: DO YOU FEEL ANYONE HAS EXPLOITED OR TAKEN ADVANTAGE OF YOU FINANCIALLY OR OF YOUR PERSONAL PROPERTY?: NO

## 2024-01-14 ASSESSMENT — ENCOUNTER SYMPTOMS
EYE PAIN: 0
VOMITING: 0
FEVER: 0
SEIZURES: 0
SORE THROAT: 0
ABDOMINAL PAIN: 0
BACK PAIN: 1
COLOR CHANGE: 0
SHORTNESS OF BREATH: 0
CHILLS: 0
ARTHRALGIAS: 0
COUGH: 0
HEMATURIA: 0
PALPITATIONS: 0
DYSURIA: 0

## 2024-01-14 ASSESSMENT — PAIN - FUNCTIONAL ASSESSMENT
PAIN_FUNCTIONAL_ASSESSMENT: 0-10

## 2024-01-14 ASSESSMENT — COGNITIVE AND FUNCTIONAL STATUS - GENERAL
MOBILITY SCORE: 10
PATIENT BASELINE BEDBOUND: NO
DRESSING REGULAR UPPER BODY CLOTHING: A LITTLE
DAILY ACTIVITIY SCORE: 17
TURNING FROM BACK TO SIDE WHILE IN FLAT BAD: A LITTLE
STANDING UP FROM CHAIR USING ARMS: TOTAL
MOVING FROM LYING ON BACK TO SITTING ON SIDE OF FLAT BED WITH BEDRAILS: A LITTLE
PERSONAL GROOMING: A LITTLE
CLIMB 3 TO 5 STEPS WITH RAILING: TOTAL
HELP NEEDED FOR BATHING: A LITTLE
MOVING TO AND FROM BED TO CHAIR: TOTAL
TOILETING: A LOT
DRESSING REGULAR LOWER BODY CLOTHING: A LOT
WALKING IN HOSPITAL ROOM: TOTAL

## 2024-01-14 ASSESSMENT — ACTIVITIES OF DAILY LIVING (ADL)
BATHING: INDEPENDENT
FEEDING YOURSELF: INDEPENDENT
JUDGMENT_ADEQUATE_SAFELY_COMPLETE_DAILY_ACTIVITIES: YES
GROOMING: INDEPENDENT
TOILETING: INDEPENDENT
WALKS IN HOME: INDEPENDENT
ADEQUATE_TO_COMPLETE_ADL: YES
HEARING - RIGHT EAR: FUNCTIONAL
DRESSING YOURSELF: INDEPENDENT
ASSISTIVE_DEVICE: WALKER
HEARING - LEFT EAR: FUNCTIONAL
PATIENT'S MEMORY ADEQUATE TO SAFELY COMPLETE DAILY ACTIVITIES?: YES

## 2024-01-14 ASSESSMENT — LIFESTYLE VARIABLES
HOW OFTEN DO YOU HAVE A DRINK CONTAINING ALCOHOL: NEVER
AUDIT-C TOTAL SCORE: 0
HOW OFTEN DO YOU HAVE 6 OR MORE DRINKS ON ONE OCCASION: NEVER
SKIP TO QUESTIONS 9-10: 1
AUDIT-C TOTAL SCORE: 0
HOW MANY STANDARD DRINKS CONTAINING ALCOHOL DO YOU HAVE ON A TYPICAL DAY: PATIENT DOES NOT DRINK

## 2024-01-14 ASSESSMENT — PAIN SCALES - GENERAL
PAINLEVEL_OUTOF10: 0 - NO PAIN
PAINLEVEL_OUTOF10: 8
PAINLEVEL_OUTOF10: 10 - WORST POSSIBLE PAIN
PAINLEVEL_OUTOF10: 10 - WORST POSSIBLE PAIN

## 2024-01-14 ASSESSMENT — COLUMBIA-SUICIDE SEVERITY RATING SCALE - C-SSRS
1. IN THE PAST MONTH, HAVE YOU WISHED YOU WERE DEAD OR WISHED YOU COULD GO TO SLEEP AND NOT WAKE UP?: NO
2. HAVE YOU ACTUALLY HAD ANY THOUGHTS OF KILLING YOURSELF?: NO
6. HAVE YOU EVER DONE ANYTHING, STARTED TO DO ANYTHING, OR PREPARED TO DO ANYTHING TO END YOUR LIFE?: NO

## 2024-01-14 NOTE — CONSULTS
Chief Complaint: R ankle pain    History of Present Illness: 78 y.o. female with PMHx lumbar radiculopathy s/p laminectomy, SVT presents to [facility] ED complaining of R ankle pain after a ground level fall. Patient reports that over the past 3 weeks she has had difficulty moving her right leg due to radicular pain and has sustained multiple falls in her home. She is uncertain when she injured her ankle but has had pain for the past few days and thinks it occurred when she fell ambulating between her bedroom and bathroom. Pain is worse with R ankle palpation and ROM. Patient endorses numbness/tingling in the right lower extremity for the past few weeks. Patient ambulates with a walker at baseline and mostly stays at home in her senior apartment building. Patient denies taking anticoagulation and is a non-smoker. She was found to have an ankle fracture dislocation and ED attempted reduction with persistent medial clear space widening, for which orthopaedic surgery was consulted. Additional injuries include the following: acute back pain and right lumbar radiculopathy.    Past Medical History:   Past Medical History:   Diagnosis Date    Personal history of other diseases of the respiratory system 09/14/2020    History of acute bronchitis    Unspecified fall, initial encounter 12/02/2021    Accidental fall, initial encounter        Past Surgical History:  Past Surgical History:   Procedure Laterality Date    OTHER SURGICAL HISTORY  01/08/2020    Spinal surgery    OTHER SURGICAL HISTORY  01/08/2020    Hysterectomy    OTHER SURGICAL HISTORY  01/08/2020    Femur fracture repair        Social History: Tobacco use as noted in HPI. Denies alcohol use. Denies drug use.    Family History: Non-contributory to patient’s acute orthopaedic injury.    Review of Systems: Comprehensive review of systems negative except as noted in HPI and exam.    OBJECTIVE    Vitals:  Vitals:    01/14/24 1445   BP: 98/62   Pulse: 72   Resp: 10    Temp:    SpO2: 100%        Physical Examination:  - Constitutional: no acute distress, alert, cooperative  - Eyes: anicteric  - Head/Neck: normocephalic, atraumatic  - Respiratory/Thorax: normal work of breathing  - Cardiovascular: extremities warm and well perfused  - Gastrointestinal: non-distended  - Psychological: appropriate mood/behavior  - Skin: warm and dry; additional findings in musculoskeletal evaluation  - Musculoskeletal:    RIGHT lower extremity:   -Appearance: skin intact, gross deformity about ankle with ecchymosis and edema  -TTP diffusely about ankle. Non-TTP about remainder of lower extremity.  -Motor intact in EHL/FHL/DF/PF  -Diminished sensation in saph/memo/spn/dpn/tibial nerve distributions (chronic/predated ankle injury per patient)  -Toes wwp, 2+ DP and PT pulses  -Compartments soft and compressible    Able to range bilateral upper extremities and left lower extremity without pain. Left lower extremity non-TTP.    Imaging:  Radiographs of the right ankle obtained 1/14/24 demonstrate bimalleolar equivalent ankle fracture dislocation with Dailey B distal fibula fracture and medial clear space widening.    ASSESSMENT: 78 y.o. female with PMHx lumbar radiculopathy s/p laminectomy, SVT presenting with R bimalleolar equivalent ankle fracture dislocation after sustaining recurrent falls in her home over the past few days. Injury is closed and NVI to patient's baseline (diminished sensation in saph/memo/spn/dpn/tibial nerve distributions).    IV pain medication was administered by the emergency department. The right ankle was cleansed with alcohol and injected with lidocaine. The right ankle was closed reduced and placed in a well-padded short leg splint. Post-splint radiographs demonstrate appropriate alignment.    PLAN:  - Patient will require ORIF of her right ankle on a non-urgent basis. If she is admitted, depending on OR availability surgery may be able to be performed by Dr. Escudero on Wednesday  1/17.  - Weight bearing status: NWB RLE in short leg splint  - Antibiotics: none indicated  - Diet: ok for diet  - DVT prophylaxis: per primary team  - Dispo: OK for discharge with outpatient follow up with Dr. Escudero this week. If patient is admitted, orthopaedic surgery will continue to follow and plan for surgery while admitted (pending OR availability).    Patient was staffed with attending surgeon, Dr. Escudero.    Guero Ramsey MD  Orthopaedic Surgery, PGY-4  Available by Epic Chat    Please Slyce ortho ZECHARIAH with questions/concerns.

## 2024-01-14 NOTE — ED PROVIDER NOTES
HPI   Chief Complaint   Patient presents with    Weakness, Gen         78-year-old female, ongoing back problems, multiple prior interventions surgeries injections etc. followed by pain management.  Multiple ED visits and hospitalizations within the last several weeks to months.  Scheduled or at least requested to get outpatient metal suppression MRI of entire spine.  Presents with leg weakness intractable pain despite home analgesics.  Back to the ED at this time.  No bowel or bladder incontinence.  States cannot ambulate safely cannot even use her walker which she is normally using.                          Jocy Coma Scale Score: 15                  Patient History   Past Medical History:   Diagnosis Date    Personal history of other diseases of the respiratory system 09/14/2020    History of acute bronchitis    Unspecified fall, initial encounter 12/02/2021    Accidental fall, initial encounter     Past Surgical History:   Procedure Laterality Date    OTHER SURGICAL HISTORY  01/08/2020    Spinal surgery    OTHER SURGICAL HISTORY  01/08/2020    Hysterectomy    OTHER SURGICAL HISTORY  01/08/2020    Femur fracture repair     Family History   Problem Relation Name Age of Onset    Other (Cardiac Disorder) Mother          CABG; Defribillator in place    Coronary artery disease Mother      Other (Cardiac Disorder) Father          CABG; Defribillator in place    Coronary artery disease Father      Heart attack Father  54    Ovarian cancer Maternal Grandmother      Alzheimer's disease Other Aunt      Social History     Tobacco Use    Smoking status: Never    Smokeless tobacco: Never   Substance Use Topics    Alcohol use: Not Currently    Drug use: Never       Review of Systems   Constitutional:  Negative for chills and fever.   HENT:  Negative for ear pain and sore throat.    Eyes:  Negative for pain and visual disturbance.   Respiratory:  Negative for cough and shortness of breath.    Cardiovascular:  Negative for  chest pain and palpitations.   Gastrointestinal:  Negative for abdominal pain and vomiting.   Genitourinary:  Negative for dysuria and hematuria.   Musculoskeletal:  Positive for back pain and gait problem. Negative for arthralgias.   Skin:  Negative for color change and rash.   Neurological:  Negative for seizures and syncope.   All other systems reviewed and are negative.       Physical Exam   ED Triage Vitals   Temp Pulse Resp BP   -- -- -- --      SpO2 Temp src Heart Rate Source Patient Position   -- -- -- --      BP Location FiO2 (%)     -- --       Physical Exam  Vitals reviewed.   Constitutional:       General: She is not in acute distress.     Appearance: She is well-developed.   HENT:      Head: Normocephalic and atraumatic.      Right Ear: External ear normal.      Left Ear: External ear normal.      Nose: Nose normal.      Mouth/Throat:      Mouth: Mucous membranes are moist.      Pharynx: Oropharynx is clear.   Eyes:      Conjunctiva/sclera: Conjunctivae normal.      Pupils: Pupils are equal, round, and reactive to light.   Neck:      Vascular: No JVD.   Cardiovascular:      Rate and Rhythm: Normal rate and regular rhythm.      Pulses: Normal pulses.   Pulmonary:      Effort: Pulmonary effort is normal. No accessory muscle usage or respiratory distress.      Breath sounds: Normal breath sounds.   Abdominal:      General: Abdomen is flat. There is no distension.      Palpations: Abdomen is soft. There is no mass.      Tenderness: There is no abdominal tenderness.   Musculoskeletal:         General: Normal range of motion.      Cervical back: Normal range of motion and neck supple.   Lymphadenopathy:      Cervical: No cervical adenopathy.   Skin:     General: Skin is warm and dry.      Capillary Refill: Capillary refill takes less than 2 seconds.      Comments: No zoster rash seen   Neurological:      General: No focal deficit present.      Mental Status: She is alert. Mental status is at baseline.       Comments: Generalized weakness may be due to pain of lower extremities.  Symmetric, her pain radiates more so down the right buttock right lumbosacral into right leg area.  Reflexes are symmetrically diminished around 0-1+ bilaterally.  Intact distal pulses are noted.  She has bruising ecchymoses about the right ankle and foot area.  She states this is new.    No evidence of acute neurovascular compromise.  Distal pulses intact.   Psychiatric:         Mood and Affect: Mood normal.                 ECG if performed, ordered and interpreted by ED physician:        Labs Reviewed   CBC WITH AUTO DIFFERENTIAL - Abnormal       Result Value    WBC 11.5 (*)     nRBC 0.0      RBC 3.99 (*)     Hemoglobin 11.9 (*)     Hematocrit 37.0      MCV 93      MCH 29.8      MCHC 32.2      RDW 15.5 (*)     Platelets 256      Neutrophils % 58.5      Immature Granulocytes %, Automated 0.4      Lymphocytes % 27.1      Monocytes % 10.0      Eosinophils % 3.6      Basophils % 0.4      Neutrophils Absolute 6.74 (*)     Immature Granulocytes Absolute, Automated 0.05      Lymphocytes Absolute 3.13 (*)     Monocytes Absolute 1.15 (*)     Eosinophils Absolute 0.41 (*)     Basophils Absolute 0.05     COMPREHENSIVE METABOLIC PANEL - Normal    Glucose 98      Sodium 138      Potassium 3.5      Chloride 104      Bicarbonate 24      Anion Gap 14      Urea Nitrogen 16      Creatinine 0.89      eGFR 67      Calcium 8.7      Albumin 3.5      Alkaline Phosphatase 77      Total Protein 6.5      AST 18      Bilirubin, Total 0.9      ALT 11     URINALYSIS WITH REFLEX CULTURE AND MICROSCOPIC    Narrative:     The following orders were created for panel order Urinalysis with Reflex Culture and Microscopic.  Procedure                               Abnormality         Status                     ---------                               -----------         ------                     Urinalysis with Reflex C...[[id number]]                                                  Extra Urine Gray Tube[[id number]]                                                         Please view results for these tests on the individual orders.   URINALYSIS WITH REFLEX CULTURE AND MICROSCOPIC   EXTRA URINE GRAY TUBE          XR tibia fibula right 2 views   Final Result   1. Oblique transsyndesmotic fracture of the distal fibula with   displacement.   2. Avulsion fracturing of the medial malleolus.   3. Anteromedial subluxation of the tibia relative to the talar dome   with incongruent ankle mortise.   4. Possible fracturing of the anterior margin of the tibia.   5. Limited assessment of the posterior malleolus of the tibia on this   exam.        Signed by: Rosendo Saeed 1/14/2024 1:50 PM   Dictation workstation:   [id number]      XR ankle right 2 views   Final Result   1. Oblique transsyndesmotic fracture of the distal fibula with   displacement.   2. Avulsion fracturing of the medial malleolus.   3. Anteromedial subluxation of the tibia relative to the talar dome   with incongruent ankle mortise.   4. Possible fracturing of the anterior margin of the tibia.   5. Limited assessment of the posterior malleolus of the tibia on this   exam.        Signed by: Rosendo Saeed 1/14/2024 1:50 PM   Dictation workstation:   [id number]      XR foot right 1-2 views   Final Result   1. Oblique transsyndesmotic fracture of the distal fibula with   displacement.   2. Avulsion fracturing of the medial malleolus.   3. Anteromedial subluxation of the tibia relative to the talar dome   with incongruent ankle mortise.   4. Possible fracturing of the anterior margin of the tibia.   5. Limited assessment of the posterior malleolus of the tibia on this   exam.        Signed by: Rosendo Saeed 1/14/2024 1:50 PM   Dictation workstation:   [id number]      XR ankle right 2 views    (Results Pending)            ED Course & MDM     ED Medication Administration from 01/14/2024 1025 to 01/14/2024 1500         Date/Time Order Dose  Route Action Action by     01/14/2024 1211 EST HYDROmorphone (Dilaudid) injection 1 mg 1 mg intravenous Given Vivian, Y     01/14/2024 1211 EST ondansetron (Zofran) injection 4 mg 4 mg intravenous Given Vivian, Y     01/14/2024 1211 EST sodium chloride 0.9 % bolus 1,000 mL 1,000 mL intravenous New Bag Vivian, Y     01/14/2024 1400 EST ketamine injection 30.5 mg 30.5 mg intravenous Not Given Angirosmery, ALESHA     01/14/2024 1400 EST propofol (Diprivan) injection 31 mg 31 mg intravenous Not Given Annmarie, K     01/14/2024 1420 EST ketamine (Ketalar) injection 50 mg intravenous Given ALESHA Chaves                   Diagnoses as of 01/14/24 1500   Acute back pain, unspecified back location, unspecified back pain laterality   Closed fracture of right ankle, initial encounter       Medical Decision Making  MRI of spine ordered as per my discussion with Dr. Soliman..  He will accept the patient onto observation at this time due to intractability of pain and difficulty ambulating.  Imaging of tib-fib ankle foot based on ecchymoses and mild swelling will be done in addition.  Pain meds administered/offered.      X-ray right ankle reveals bimalleolar trimalleolar fracture with some subluxation.  No complete dislocation but subluxation noted.      Sedation and reduction attempted.  Tibiotalar relationship markedly improved, but mortise still wide.    Orthopedics reviewed postreduction films.  They wish to attempt with local/regional block with lidocaine and resplinting.    Disposition is still hospitalize.     Patient has significant swelling and edema around the ankle.  Not likely to be easily reduced.    Orthopedic resident contacted. (Dr ALESHA Ramsey)  Orthopedic consult placed into epic EMR.          Procedure  Moderate Sedation    Performed by: Akbar Chaves MD MPH  Authorized by: Akbar Chaves MD MPH    Consent:     Consent obtained:  Written    Consent given by:  Patient    Risks discussed:  Prolonged sedation  necessitating reversal and inadequate sedation    Alternatives discussed:  Analgesia without sedation  Universal protocol:     Procedure explained and questions answered to patient or proxy's satisfaction: yes      Relevant documents present and verified: yes      Test results available: yes      Imaging studies available: yes      Immediately prior to procedure, a time out was called: yes      Patient identity confirmed:  Verbally with patient  Indications:     Procedure performed:  Dislocation reduction    Procedure necessitating sedation performed by:  Physician performing sedation    Intended level of sedation:  Moderate  Pre-sedation assessment:     ASA classification: class 2 - patient with mild systemic disease      Mouth openin finger widths    Thyromental distance:  3 finger widths    Mallampati score:  II - soft palate, uvula, fauces visible    Neck mobility: normal      Pre-sedation assessments completed and reviewed: airway patency    Immediate pre-procedure details:     Reviewed: vital signs      Verified: bag valve mask available    Procedure details (see MAR for exact dosages):     Preoxygenation:  Nasal cannula    Sedation:  Ketamine    Intra-procedure monitoring:  Blood pressure monitoring, cardiac monitor, continuous capnometry, continuous pulse oximetry, frequent LOC assessments and frequent vital sign checks    Intra-procedure events comment:  Capnography low    Intra-procedure management:  BVM ventilation    Total sedation time (minutes):  10  Post-procedure details:     Attendance: Constant attendance by certified staff until patient recovered      Post-sedation assessments completed and reviewed: airway patency      Procedure completion:  Tolerated well, no immediate complications  Orthopaedic Injury Treatment - Lower Extremity    Performed by: Akbar Chaves MD MPH  Authorized by: Akbar Chaves MD MPH    Consent:     Consent obtained:  Verbal    Risks discussed:  Fracture and  irreducible dislocation    Alternatives discussed:  No treatment  Universal protocol:     Patient identity confirmed:  Verbally with patient  Sedation:     Sedation type:  Moderate sedation  Procedure details:     Manipulation performed: yes      Reduction successful: yes (Tibiotalar better, more distal leg)      Reduction confirmed with imaging: yes      Immobilization:  Splint    Splint type:  Short leg (Stirrup splint and posterior splint)    Supplies used:  Fiberglass and cotton padding                     Akbar Chaves MD MPH  01/14/24 1351       Akbar Chaves MD MPH  01/14/24 1500

## 2024-01-14 NOTE — H&P
"History Of Present Illness  Roby Qureshi is a 78 y.o. female presenting with intractable right lower back and leg pain, acute exacerbation of chronic back and leg pain, acute fracture of the distal fibula, acute ankle dislocation, unable to ambulate, unable to care for self, right leg weakness..  Patient with a history of chronic back pain and lumbar radiculopathy on the right with multiple injections, status postlaminectomy.  Patient is followed by pain management.  She has had multiple ED visits and hospitalizations for the leg and back pain.  Patient has had multiple CTs.  She also has a history of SVT.  Patient presented to the emergency department today by EMS.  Patient is a poor historian especially in terms of specifics of dates and times and duration of pain etc.  Apparently she has been having acute exacerbation of her right lower back and right leg pain for about 5 weeks.  Patient states it has been constant for the last couple of weeks and a 10 out of 10.  Patient states for the last few days she has been unable to get out of bed and has been laying in her own waste.  Patient states she has had 2 or 3 falls in the last few days and at some point injured her right foot and ankle but she was not aware of the swelling and ecchymosis until today.  Patient states she has not been eating or drinking because she has not been able to get out of bed for the last couple of days.  She has intermittent chronic diarrhea but no other complaints at this time.  Should be noted that the patient's neighbor called and spoke with the ED  who said the patient is living in \"deplorable living conditions.\"  The neighbor feels the patient cannot live alone anymore.    In the emergency department the patient was given Dilaudid and Zofran for pain and a liter of normal saline IV.  She has a urinalysis ordered and pending.  Per the ED attending note this patient was going to have an MRI of the spine.  MRI needs to " be done with metal suppression which they apparently can not do here, so the orders for MR were canceled. The   ED also ordered x-rays of the  right tib-fib ankle and foot secondary to ecchymosis and swelling.  Those results are pending.  At this point I reviewed them briefly and the patient does appear to have a distal fibular fracture and likely right ankle dislocation.  I spoke with Dr. Chaves and advised him of the x-ray results.  He is going to order an orthopedic consult from the ED.  Labs revealed a metabolic panel completely within normal limits.  CBC had a slightly elevated white count of 11.5 however that is trending down from 12.3 on December 30 and 20.1 on December 28.  Blood cultures were negative from 1228.  Her hemoglobin is 11.9 and it was the same on December 30.  She does have a slight left shift.  Otherwise unremarkable CBC.    Past medical history: Lumbar radiculopathy, chronic back pain, laminectomy, multiple injections for the back, followed by pain management.  SVT.    Medications: Doxepin at night, gabapentin 800 mg 3 times daily, metoprolol XR 25 mg daily, topiramate 100 mg 1 in the morning and 2 at night.    Allergies: Patient had oxycodone listed as an allergy but she states that is not true and she does take Percocet on a regular basis.  She does complain of hallucinations with Ambien.    Social history: Patient denies tobacco or alcohol use.  She lives alone in a senior living apartment at [facility] in independent living.  She says her son Gato is her power of  and his number is [phone] and her daughter Bridget Rainey is at [phone].  Patient is a full code     Past Medical History:   Diagnosis Date    Personal history of other diseases of the respiratory system 09/14/2020    History of acute bronchitis    Unspecified fall, initial encounter 12/02/2021    Accidental fall, initial encounter     Past Surgical History:   Procedure Laterality Date    OTHER SURGICAL HISTORY   01/08/2020    Spinal surgery    OTHER SURGICAL HISTORY  01/08/2020    Hysterectomy    OTHER SURGICAL HISTORY  01/08/2020    Femur fracture repair     Social History     Tobacco Use    Smoking status: Never    Smokeless tobacco: Never   Substance Use Topics    Alcohol use: Not Currently    Drug use: Never        Family History  Family History   Problem Relation Name Age of Onset    Other (Cardiac Disorder) Mother          CABG; Defribillator in place    Coronary artery disease Mother      Other (Cardiac Disorder) Father          CABG; Defribillator in place    Coronary artery disease Father      Heart attack Father  54    Ovarian cancer Maternal Grandmother      Alzheimer's disease Other Aunt         Allergies  Ambien [zolpidem]    Review of Systems  Patient denies chest pain, shortness of breath, nausea, vomiting, fever, chills,  constipation,  vision changes, rashes, dysuria, paresthesias, vertigo, headache, cough or cold symptoms, or any other complaints at this time. A complete review of systems was done, and is as stated in the history of present illness, is otherwise negative or not pertinent to the complaint.    Physical Exam  Physical exam: Vital signs and nurses notes were reviewed.    General:  no acute distress. Alert and oriented  x 3.  Talks easily in full sentences    Head: atraumatic and normocephalic    Eyes: Pupils equal round reactive to light, EOMs are intact, conjunctivae is not injected.    Oropharynx: No erythema or exudate noted, no trismus or drooling, tongue is dry    Ears:  normal external exam, no swelling or erythema,     Nasal: normal external exam,     Neck: Supple, full range of motion,     Cardiac: Regular rate and rhythm. No murmurs noted.     Pulmonary: Lungs clear bilaterally with good aeration. No adventitious breath sounds. No wheezes rales or rhonchi. No accessory muscle use no retraction noted.    Abdomen: Soft,  Nontender. No guarding, rigidity, or distention. Normoactive bowel  sounds. No pulsatile masses, no bruits.     Extremities:  Full range of motion of upper extremities.  Patient can lift and hold the left leg without   Tremor.  The right lower extremity has ecchymosis with some soft tissue swelling to the foot, ankle, and distal lower leg.  The ankle is tender to palpation and slightly tense over the medial aspect.  She has a strong dorsalis pedal pulse.  Sensation is intact    Skin: No rash seen. Skin is warm and dry     Neuro: Patient is alert and oriented x3. Speech is clear. There is no asymmetry with facial grimaces, and no tongue deviation. Patient moves all extremities independently. Sensation is intact. No obvious neuro deficits are noted.     Last Recorded Vitals  /68 (BP Location: Right arm, Patient Position: Lying)   Pulse 79   Temp 37.4 °C (99.4 °F) (Oral)   Resp 18   Wt 61.2 kg (135 lb)   SpO2 98%     Relevant Results  Scheduled medications     Continuous medications     PRN medications  PRN medications: HYDROmorphone, oxyCODONE    Results for orders placed or performed during the hospital encounter of 01/14/24 (from the past 24 hour(s))   CBC and Auto Differential   Result Value Ref Range    WBC 11.5 (H) 4.4 - 11.3 x10*3/uL    nRBC 0.0 0.0 - 0.0 /100 WBCs    RBC 3.99 (L) 4.00 - 5.20 x10*6/uL    Hemoglobin 11.9 (L) 12.0 - 16.0 g/dL    Hematocrit 37.0 36.0 - 46.0 %    MCV 93 80 - 100 fL    MCH 29.8 26.0 - 34.0 pg    MCHC 32.2 32.0 - 36.0 g/dL    RDW 15.5 (H) 11.5 - 14.5 %    Platelets 256 150 - 450 x10*3/uL    Neutrophils % 58.5 40.0 - 80.0 %    Immature Granulocytes %, Automated 0.4 0.0 - 0.9 %    Lymphocytes % 27.1 13.0 - 44.0 %    Monocytes % 10.0 2.0 - 10.0 %    Eosinophils % 3.6 0.0 - 6.0 %    Basophils % 0.4 0.0 - 2.0 %    Neutrophils Absolute 6.74 (H) 1.60 - 5.50 x10*3/uL    Immature Granulocytes Absolute, Automated 0.05 0.00 - 0.50 x10*3/uL    Lymphocytes Absolute 3.13 (H) 0.80 - 3.00 x10*3/uL    Monocytes Absolute 1.15 (H) 0.05 - 0.80 x10*3/uL     Eosinophils Absolute 0.41 (H) 0.00 - 0.40 x10*3/uL    Basophils Absolute 0.05 0.00 - 0.10 x10*3/uL   Comprehensive Metabolic Panel   Result Value Ref Range    Glucose 98 74 - 99 mg/dL    Sodium 138 136 - 145 mmol/L    Potassium 3.5 3.5 - 5.3 mmol/L    Chloride 104 98 - 107 mmol/L    Bicarbonate 24 21 - 32 mmol/L    Anion Gap 14 10 - 20 mmol/L    Urea Nitrogen 16 6 - 23 mg/dL    Creatinine 0.89 0.50 - 1.05 mg/dL    eGFR 67 >60 mL/min/1.73m*2    Calcium 8.7 8.6 - 10.3 mg/dL    Albumin 3.5 3.4 - 5.0 g/dL    Alkaline Phosphatase 77 33 - 136 U/L    Total Protein 6.5 6.4 - 8.2 g/dL    AST 18 9 - 39 U/L    Bilirubin, Total 0.9 0.0 - 1.2 mg/dL    ALT 11 7 - 45 U/L     XR tibia fibula right 2 views    (Results Pending)   XR ankle right 2 views    (Results Pending)   XR foot right 1-2 views    (Results Pending)          Assessment/Plan   Principal Problem:    Back pain, unspecified back location, unspecified back pain laterality, unspecified chronicity  Active Problems:    Acute back pain, unspecified back location, unspecified back pain laterality    Acute right lumbar radiculopathy    Closed fracture of right ankle    Ankle dislocation, right, initial encounter      Plan:  Dilaudid as needed severe pain  Oxycodone as needed moderate pain  Zofran as needed.  Home meds including gabapentin, metoprolol, and topiramate were ordered.  I did not order the doxepin at night secondary to the narcotics  Urinalysis ordered and pending from ED  Repeat labs in the morning  The x-rays have not been officially read yet,  I did bring  my findings on the x-ray to Dr. Chaves's attention and he has ordered an orthopedic consult.  Nonweightbearing on the right leg  Telemetry ordered  Findings, orders, plan etc. all discussed with Dr. Janny Garcia PA-C

## 2024-01-14 NOTE — ED TRIAGE NOTES
Pt BIBA with the c/o bilateral lower ext weakness x3 days. Pt states that she has been feeling under the weather for the past 2 weeks and now she is weak and has pain in her legs that she falls when trying to walk.

## 2024-01-14 NOTE — H&P (VIEW-ONLY)
Chief Complaint: R ankle pain    History of Present Illness: 78 y.o. female with PMHx lumbar radiculopathy s/p laminectomy, SVT presents to [facility] ED complaining of R ankle pain after a ground level fall. Patient reports that over the past 3 weeks she has had difficulty moving her right leg due to radicular pain and has sustained multiple falls in her home. She is uncertain when she injured her ankle but has had pain for the past few days and thinks it occurred when she fell ambulating between her bedroom and bathroom. Pain is worse with R ankle palpation and ROM. Patient endorses numbness/tingling in the right lower extremity for the past few weeks. Patient ambulates with a walker at baseline and mostly stays at home in her senior apartment building. Patient denies taking anticoagulation and is a non-smoker. She was found to have an ankle fracture dislocation and ED attempted reduction with persistent medial clear space widening, for which orthopaedic surgery was consulted. Additional injuries include the following: acute back pain and right lumbar radiculopathy.    Past Medical History:   Past Medical History:   Diagnosis Date    Personal history of other diseases of the respiratory system 09/14/2020    History of acute bronchitis    Unspecified fall, initial encounter 12/02/2021    Accidental fall, initial encounter        Past Surgical History:  Past Surgical History:   Procedure Laterality Date    OTHER SURGICAL HISTORY  01/08/2020    Spinal surgery    OTHER SURGICAL HISTORY  01/08/2020    Hysterectomy    OTHER SURGICAL HISTORY  01/08/2020    Femur fracture repair        Social History: Tobacco use as noted in HPI. Denies alcohol use. Denies drug use.    Family History: Non-contributory to patient’s acute orthopaedic injury.    Review of Systems: Comprehensive review of systems negative except as noted in HPI and exam.    OBJECTIVE    Vitals:  Vitals:    01/14/24 1445   BP: 98/62   Pulse: 72   Resp: 10    Temp:    SpO2: 100%        Physical Examination:  - Constitutional: no acute distress, alert, cooperative  - Eyes: anicteric  - Head/Neck: normocephalic, atraumatic  - Respiratory/Thorax: normal work of breathing  - Cardiovascular: extremities warm and well perfused  - Gastrointestinal: non-distended  - Psychological: appropriate mood/behavior  - Skin: warm and dry; additional findings in musculoskeletal evaluation  - Musculoskeletal:    RIGHT lower extremity:   -Appearance: skin intact, gross deformity about ankle with ecchymosis and edema  -TTP diffusely about ankle. Non-TTP about remainder of lower extremity.  -Motor intact in EHL/FHL/DF/PF  -Diminished sensation in saph/memo/spn/dpn/tibial nerve distributions (chronic/predated ankle injury per patient)  -Toes wwp, 2+ DP and PT pulses  -Compartments soft and compressible    Able to range bilateral upper extremities and left lower extremity without pain. Left lower extremity non-TTP.    Imaging:  Radiographs of the right ankle obtained 1/14/24 demonstrate bimalleolar equivalent ankle fracture dislocation with Dailey B distal fibula fracture and medial clear space widening.    ASSESSMENT: 78 y.o. female with PMHx lumbar radiculopathy s/p laminectomy, SVT presenting with R bimalleolar equivalent ankle fracture dislocation after sustaining recurrent falls in her home over the past few days. Injury is closed and NVI to patient's baseline (diminished sensation in saph/memo/spn/dpn/tibial nerve distributions).    IV pain medication was administered by the emergency department. The right ankle was cleansed with alcohol and injected with lidocaine. The right ankle was closed reduced and placed in a well-padded short leg splint. Post-splint radiographs demonstrate appropriate alignment.    PLAN:  - Patient will require ORIF of her right ankle on a non-urgent basis. If she is admitted, depending on OR availability surgery may be able to be performed by Dr. Escudero on Wednesday  1/17.  - Weight bearing status: NWB RLE in short leg splint  - Antibiotics: none indicated  - Diet: ok for diet  - DVT prophylaxis: per primary team  - Dispo: OK for discharge with outpatient follow up with Dr. Escudero this week. If patient is admitted, orthopaedic surgery will continue to follow and plan for surgery while admitted (pending OR availability).    Patient was staffed with attending surgeon, Dr. Escudero.    Guero Ramsey MD  Orthopaedic Surgery, PGY-4  Available by Epic Chat    Please [facility] [email or web address] with questions/concerns.

## 2024-01-14 NOTE — PROGRESS NOTES
Pharmacy Medication History Review    Roby Qureshi is a 78 y.o. female admitted for Back pain, unspecified back location, unspecified back pain laterality, unspecified chronicity. Pharmacy reviewed the patient's ofpcx-ro-tlagmrbwy medications and allergies for accuracy.    The list below reflectives the updated PTA list. Please review each medication in order reconciliation for additional clarification and justification.  Prior to Admission Medications   Prescriptions Last Dose Informant Patient Reported?    Lactobacillus acidophilus (PROBIOTIC ORAL) 1/13/2024  Yes    Sig: Take 1 capsule by mouth 2 times a day.   doxepin (SINEquan) 100 mg capsule 1/13/2024 at pm  Yes    Sig: Take 1 capsule (100 mg) by mouth as needed at bedtime for sleep.   gabapentin (Neurontin) 800 mg tablet 1/13/2024 at pm  No    Sig: TAKE 1 TABLET BY MOUTH THREE TIMES A DAY                                    metoprolol succinate XL (Toprol-XL) 25 mg 24 hr tablet 1/13/2024 at am  Yes    Sig: Take 1 tablet (25 mg) by mouth once daily.   oxyCODONE-acetaminophen (Percocet) 5-325 mg tablet   No    Sig: Take 1 tablet by mouth every 6 hours if needed for moderate pain (4 - 6) or severe pain (7 - 10).   topiramate (Topamax) 100 mg tablet   No    Sig: TAKE 1 TABLET BY MOUTH EVERY MORNING AND 2 TABLETS AT NIGHT   Patient taking differently: Take 1 tablet (100 mg) by mouth once daily in the morning.   topiramate (Topamax) 100 mg tablet 1/13/2024 at pm  Yes    Sig: Take 2 tablets (200 mg) by mouth once daily at bedtime.                   Facility-Administered Medications: None      Spoke to the patient. Updated med list       The list below reflectives the updated allergy list. Please review each documented allergy for additional clarification and justification.  Allergies  Reviewed by Akbar Chaves MD MPH on 1/14/2024        Severity Reactions Comments    Oxycodone Not Specified Itching             Below are additional concerns with the  patient's PTA list.      Cata Hollis, CPhT

## 2024-01-15 LAB
ANION GAP SERPL CALC-SCNC: 12 MMOL/L (ref 10–20)
BUN SERPL-MCNC: 13 MG/DL (ref 6–23)
CALCIUM SERPL-MCNC: 8.2 MG/DL (ref 8.6–10.3)
CHLORIDE SERPL-SCNC: 111 MMOL/L (ref 98–107)
CO2 SERPL-SCNC: 22 MMOL/L (ref 21–32)
CREAT SERPL-MCNC: 0.8 MG/DL (ref 0.5–1.05)
EGFRCR SERPLBLD CKD-EPI 2021: 76 ML/MIN/1.73M*2
ERYTHROCYTE [DISTWIDTH] IN BLOOD BY AUTOMATED COUNT: 15.7 % (ref 11.5–14.5)
GLUCOSE BLD MANUAL STRIP-MCNC: 110 MG/DL (ref 74–99)
GLUCOSE BLD MANUAL STRIP-MCNC: 131 MG/DL (ref 74–99)
GLUCOSE BLD MANUAL STRIP-MCNC: 133 MG/DL (ref 74–99)
GLUCOSE SERPL-MCNC: 102 MG/DL (ref 74–99)
HCT VFR BLD AUTO: 35.2 % (ref 36–46)
HGB BLD-MCNC: 10.9 G/DL (ref 12–16)
HOLD SPECIMEN: NORMAL
MCH RBC QN AUTO: 29.5 PG (ref 26–34)
MCHC RBC AUTO-ENTMCNC: 31 G/DL (ref 32–36)
MCV RBC AUTO: 95 FL (ref 80–100)
NRBC BLD-RTO: 0 /100 WBCS (ref 0–0)
PLATELET # BLD AUTO: 209 X10*3/UL (ref 150–450)
POTASSIUM SERPL-SCNC: 3.5 MMOL/L (ref 3.5–5.3)
RBC # BLD AUTO: 3.69 X10*6/UL (ref 4–5.2)
SODIUM SERPL-SCNC: 141 MMOL/L (ref 136–145)
WBC # BLD AUTO: 8.1 X10*3/UL (ref 4.4–11.3)

## 2024-01-15 PROCEDURE — 82947 ASSAY GLUCOSE BLOOD QUANT: CPT

## 2024-01-15 PROCEDURE — 2500000001 HC RX 250 WO HCPCS SELF ADMINISTERED DRUGS (ALT 637 FOR MEDICARE OP): Performed by: PHYSICIAN ASSISTANT

## 2024-01-15 PROCEDURE — 2500000004 HC RX 250 GENERAL PHARMACY W/ HCPCS (ALT 636 FOR OP/ED): Performed by: PHYSICIAN ASSISTANT

## 2024-01-15 PROCEDURE — 99232 SBSQ HOSP IP/OBS MODERATE 35: CPT | Performed by: STUDENT IN AN ORGANIZED HEALTH CARE EDUCATION/TRAINING PROGRAM

## 2024-01-15 PROCEDURE — 1200000002 HC GENERAL ROOM WITH TELEMETRY DAILY

## 2024-01-15 PROCEDURE — 80048 BASIC METABOLIC PNL TOTAL CA: CPT

## 2024-01-15 PROCEDURE — 99232 SBSQ HOSP IP/OBS MODERATE 35: CPT | Performed by: INTERNAL MEDICINE

## 2024-01-15 PROCEDURE — 94760 N-INVAS EAR/PLS OXIMETRY 1: CPT

## 2024-01-15 PROCEDURE — 2500000004 HC RX 250 GENERAL PHARMACY W/ HCPCS (ALT 636 FOR OP/ED): Performed by: HOSPITALIST

## 2024-01-15 PROCEDURE — 36415 COLL VENOUS BLD VENIPUNCTURE: CPT

## 2024-01-15 PROCEDURE — 85027 COMPLETE CBC AUTOMATED: CPT

## 2024-01-15 RX ORDER — TALC
6 POWDER (GRAM) TOPICAL ONCE
Status: COMPLETED | OUTPATIENT
Start: 2024-01-16 | End: 2024-01-16

## 2024-01-15 RX ADMIN — SODIUM CHLORIDE 500 ML: 9 INJECTION, SOLUTION INTRAVENOUS at 22:11

## 2024-01-15 RX ADMIN — TOPIRAMATE 100 MG: 25 TABLET, FILM COATED ORAL at 08:58

## 2024-01-15 RX ADMIN — TOPIRAMATE 200 MG: 25 TABLET, FILM COATED ORAL at 22:12

## 2024-01-15 RX ADMIN — OXYCODONE HYDROCHLORIDE 5 MG: 5 TABLET ORAL at 23:14

## 2024-01-15 RX ADMIN — PANTOPRAZOLE SODIUM 40 MG: 40 TABLET, DELAYED RELEASE ORAL at 06:26

## 2024-01-15 RX ADMIN — OXYCODONE HYDROCHLORIDE 5 MG: 5 TABLET ORAL at 11:08

## 2024-01-15 RX ADMIN — ENOXAPARIN SODIUM 40 MG: 40 INJECTION SUBCUTANEOUS at 22:11

## 2024-01-15 RX ADMIN — ACETAMINOPHEN 650 MG: 325 TABLET ORAL at 17:28

## 2024-01-15 RX ADMIN — HYDROMORPHONE HYDROCHLORIDE 0.5 MG: 1 INJECTION, SOLUTION INTRAMUSCULAR; INTRAVENOUS; SUBCUTANEOUS at 14:17

## 2024-01-15 RX ADMIN — ACETAMINOPHEN 650 MG: 325 TABLET ORAL at 11:08

## 2024-01-15 RX ADMIN — OXYCODONE HYDROCHLORIDE 5 MG: 5 TABLET ORAL at 17:28

## 2024-01-15 RX ADMIN — GABAPENTIN 800 MG: 400 CAPSULE ORAL at 22:12

## 2024-01-15 RX ADMIN — GABAPENTIN 800 MG: 400 CAPSULE ORAL at 08:58

## 2024-01-15 RX ADMIN — GABAPENTIN 800 MG: 400 CAPSULE ORAL at 15:21

## 2024-01-15 ASSESSMENT — COGNITIVE AND FUNCTIONAL STATUS - GENERAL
PERSONAL GROOMING: A LITTLE
CLIMB 3 TO 5 STEPS WITH RAILING: TOTAL
DAILY ACTIVITIY SCORE: 15
PERSONAL GROOMING: A LITTLE
STANDING UP FROM CHAIR USING ARMS: TOTAL
DAILY ACTIVITIY SCORE: 15
TURNING FROM BACK TO SIDE WHILE IN FLAT BAD: A LITTLE
MOVING TO AND FROM BED TO CHAIR: TOTAL
MOVING TO AND FROM BED TO CHAIR: TOTAL
WALKING IN HOSPITAL ROOM: TOTAL
MOVING FROM LYING ON BACK TO SITTING ON SIDE OF FLAT BED WITH BEDRAILS: A LITTLE
MOVING FROM LYING ON BACK TO SITTING ON SIDE OF FLAT BED WITH BEDRAILS: A LITTLE
DRESSING REGULAR LOWER BODY CLOTHING: A LOT
HELP NEEDED FOR BATHING: A LOT
WALKING IN HOSPITAL ROOM: TOTAL
DRESSING REGULAR LOWER BODY CLOTHING: A LOT
DRESSING REGULAR UPPER BODY CLOTHING: A LOT
TURNING FROM BACK TO SIDE WHILE IN FLAT BAD: A LITTLE
HELP NEEDED FOR BATHING: A LOT
TOILETING: A LOT
CLIMB 3 TO 5 STEPS WITH RAILING: TOTAL
MOBILITY SCORE: 10
MOBILITY SCORE: 10
DRESSING REGULAR UPPER BODY CLOTHING: A LOT
TOILETING: A LOT
STANDING UP FROM CHAIR USING ARMS: TOTAL

## 2024-01-15 ASSESSMENT — ACTIVITIES OF DAILY LIVING (ADL): LACK_OF_TRANSPORTATION: NO

## 2024-01-15 ASSESSMENT — PAIN DESCRIPTION - ORIENTATION: ORIENTATION: RIGHT

## 2024-01-15 ASSESSMENT — PAIN SCALES - GENERAL
PAINLEVEL_OUTOF10: 10 - WORST POSSIBLE PAIN
PAINLEVEL_OUTOF10: 10 - WORST POSSIBLE PAIN
PAINLEVEL_OUTOF10: 8

## 2024-01-15 ASSESSMENT — PAIN DESCRIPTION - LOCATION: LOCATION: LEG

## 2024-01-15 ASSESSMENT — PAIN - FUNCTIONAL ASSESSMENT: PAIN_FUNCTIONAL_ASSESSMENT: 0-10

## 2024-01-15 NOTE — CARE PLAN
Problem: Pain - Adult  Goal: Verbalizes/displays adequate comfort level or baseline comfort level  Outcome: Progressing     Problem: Safety - Adult  Goal: Free from fall injury  Outcome: Progressing     Problem: Discharge Planning  Goal: Discharge to home or other facility with appropriate resources  Outcome: Progressing     Problem: Chronic Conditions and Co-morbidities  Goal: Patient's chronic conditions and co-morbidity symptoms are monitored and maintained or improved  Outcome: Progressing     Problem: Skin  Goal: Decreased wound size/increased tissue granulation at next dressing change  Outcome: Progressing  Goal: Participates in plan/prevention/treatment measures  Outcome: Progressing  Goal: Prevent/manage excess moisture  Outcome: Progressing  Flowsheets (Taken 1/15/2024 4462)  Prevent/manage excess moisture:   Cleanse incontinence/protect with barrier cream   Moisturize dry skin  Goal: Prevent/minimize sheer/friction injuries  Outcome: Progressing  Goal: Promote/optimize nutrition  Outcome: Progressing  Goal: Promote skin healing  Outcome: Progressing     Problem: Pain  Goal: Takes deep breaths with improved pain control throughout the shift  Outcome: Progressing  Goal: Turns in bed with improved pain control throughout the shift  Outcome: Progressing  Goal: Walks with improved pain control throughout the shift  Outcome: Progressing  Goal: Performs ADL's with improved pain control throughout shift  Outcome: Progressing  Goal: Participates in PT with improved pain control throughout the shift  Outcome: Progressing  Goal: Free from opioid side effects throughout the shift  Outcome: Progressing  Goal: Free from acute confusion related to pain meds throughout the shift  Outcome: Progressing   The patient's goals for the shift include  pain control    The clinical goals for the shift include monitor vitals, pain, pulses. Manage anxiety

## 2024-01-15 NOTE — PROGRESS NOTES
01/15/24 1306   Discharge Planning   Living Arrangements Alone  (Lives at [facility]/ [facility])   Support Systems Friends/neighbors;Other (Comment)  ([facility] staff support)   Assistance Needed Readmission < 30 days. Patient requires assist x 1 for ADLS/ Daughter manages IADLS. Ambulatory with walker. past 2 weeks unable to mobilize. Admitted with Dx: Closed right ankle fracture.  EMS report unkept apt. SW/ AL / daughter ALL aware.   Type of Residence Assisted living   Who is requesting discharge planning? Other (Comment)  (01/15/2024 [facility] Initial Assessment)   Home or Post Acute Services Post acute facilities (Rehab/SNF/etc)  (Pending PT/OT Evals Anticipate SNF)   Type of Post Acute Facility Services Skilled nursing   Patient expects to be discharged to: Facility [facility]   Does the patient need discharge transport arranged? Yes   RoundTrip coordination needed? Yes     Patient readmission < 30 days. TCC following up with daughter for SNF choices. Orthopedics consulted for confirmed Right closed ankle fracture. Pending Orthopedic recommendations for  PT/ OT evaluations. Patient with PMH: Lumbar radiculopathy multiple surgeries & multiple interventions. Chart reviewed per TCC.  Last admission plan was for daughter to place patient at [facility].   Vashti BALES RN TCC CCM

## 2024-01-15 NOTE — PROGRESS NOTES
Roby Qureshi is a 78 y.o. female on day 1 of admission presenting with Back pain, unspecified back location, unspecified back pain laterality, unspecified chronicity.      Subjective   CHEN. Pain controlled with current regimen. Eager to eat. Voiding via purewick. Endorses some numbness in RLE       Objective     PE:  Constitutional: A&Ox3, calm and cooperative, NAD  Eyes: EOMI, clear sclera   Cardiovascular: Normal rate and regular rhythm. No murmurs  Respiratory/Thorax: CTAB, on RA  Genitourinary: Voiding independently via purewick- clear yellow  Musculoskeletal: RLE with SLS in place, able to wiggle toes, SILT distally. Adjacent compartments soft and compressible.   Neurological: A&Ox3, No focal deficits   Psychological: Appropriate mood and behavior      Last Recorded Vitals  Vitals:    01/14/24 1913 01/14/24 2026 01/15/24 0018 01/15/24 0744   BP: 95/58 92/57 94/60 90/62   BP Location: Right arm Right arm Right arm Right arm   Patient Position: Lying Lying Lying Lying   Pulse: 73 75 70 58   Resp: 18   16   Temp: 36.7 °C (98 °F) 36.7 °C (98.1 °F) 36.7 °C (98 °F) 36.6 °C (97.9 °F)   TempSrc: Oral  Oral Oral   SpO2: 98% 90% 98% 98%   Weight:       Height:             Relevant Results    Imaging:     .=== 01/14/24 ===    XR ANKLE 3+ VIEWS RIGHT    - Impression -  Improved alignment after closed reduction and casting of right ankle  bimalleolar fractures and ankle subluxation      MACRO:  None    Signed by: Maurice Hernandez 1/14/2024 4:32 PM  Dictation workstation:   [id number]   .=== 12/28/23 ===    CT ABDOMEN PELVIS WO IV CONTRAST    - Impression -  1. Liquid stool noted throughout the colon suggestive of a diarrheal  state.  No other acute process.  Correlate clinically for possible  mild colitis.  2. Cholelithiasis.  3. Mild hepatic steatosis.  Signed by Jersey Cloud MD         Lab Results:   Lab Results   Component Value Date    WBC 8.1 01/15/2024    HGB 10.9 (L) 01/15/2024    HCT 35.2 (L) 01/15/2024  "   MCV 95 01/15/2024     01/15/2024     Lab Results   Component Value Date    GLUCOSE 102 (H) 01/15/2024    CALCIUM 8.2 (L) 01/15/2024     01/15/2024    K 3.5 01/15/2024    CO2 22 01/15/2024     (H) 01/15/2024    BUN 13 01/15/2024    CREATININE 0.80 01/15/2024     Results from last 72 hours   Lab Units 01/14/24  1115   ALK PHOS U/L 77   BILIRUBIN TOTAL mg/dL 0.9   PROTEIN TOTAL g/dL 6.5   ALT U/L 11   AST U/L 18   ALBUMIN g/dL 3.5     Estimated Creatinine Clearance: 48.9 mL/min (by C-G formula based on SCr of 0.8 mg/dL).  No results found for: \"CRP\"      Assessment/Plan   78 y.o. female with PMHx lumbar radiculopathy s/p laminectomy, SVT presenting with R bimalleolar equivalent ankle fracture dislocation after sustaining recurrent falls in her home over the past few days. Injury is closed and NVI to patient's baseline (diminished sensation in saph/memo/spn/dpn/tibial nerve distributions). Patient was admitted to medicine for pain control and safety d/t lack of support at home    Right Bimalleolar equivalent ankle fracture   -plan for ORIF right ankle, tentatively Wednesday with Dr. Escudero  -Please obtain preop labs (CBC/BMP/Coags/T+S/CXR/EKG)  -Please document any further need for medical optimization prior to OR  -NPO after midnight 11/17  -DVT proph: SCD on nonaffected leg, lovenox per primary  -Pain control per primary  -WB status: NWB RLE  - pain control     Dispo: staffed with Dr. Escudero, awaiting response. Further surgical planning to be done    I spent 35 minutes in the professional and overall care of this patient.      Elizabeth Castillo PA-C           "

## 2024-01-16 LAB
ANION GAP SERPL CALC-SCNC: 12 MMOL/L (ref 10–20)
BUN SERPL-MCNC: 10 MG/DL (ref 6–23)
CALCIUM SERPL-MCNC: 8.1 MG/DL (ref 8.6–10.3)
CHLORIDE SERPL-SCNC: 111 MMOL/L (ref 98–107)
CO2 SERPL-SCNC: 22 MMOL/L (ref 21–32)
CREAT SERPL-MCNC: 0.6 MG/DL (ref 0.5–1.05)
EGFRCR SERPLBLD CKD-EPI 2021: >90 ML/MIN/1.73M*2
ERYTHROCYTE [DISTWIDTH] IN BLOOD BY AUTOMATED COUNT: 15.7 % (ref 11.5–14.5)
GLUCOSE SERPL-MCNC: 114 MG/DL (ref 74–99)
HCT VFR BLD AUTO: 33 % (ref 36–46)
HGB BLD-MCNC: 10.5 G/DL (ref 12–16)
MCH RBC QN AUTO: 30.3 PG (ref 26–34)
MCHC RBC AUTO-ENTMCNC: 31.8 G/DL (ref 32–36)
MCV RBC AUTO: 95 FL (ref 80–100)
NRBC BLD-RTO: 0 /100 WBCS (ref 0–0)
PLATELET # BLD AUTO: 232 X10*3/UL (ref 150–450)
POTASSIUM SERPL-SCNC: 3.6 MMOL/L (ref 3.5–5.3)
RBC # BLD AUTO: 3.47 X10*6/UL (ref 4–5.2)
SODIUM SERPL-SCNC: 141 MMOL/L (ref 136–145)
WBC # BLD AUTO: 7.8 X10*3/UL (ref 4.4–11.3)

## 2024-01-16 PROCEDURE — 2500000004 HC RX 250 GENERAL PHARMACY W/ HCPCS (ALT 636 FOR OP/ED): Performed by: HOSPITALIST

## 2024-01-16 PROCEDURE — 85027 COMPLETE CBC AUTOMATED: CPT | Performed by: PHYSICIAN ASSISTANT

## 2024-01-16 PROCEDURE — 2500000001 HC RX 250 WO HCPCS SELF ADMINISTERED DRUGS (ALT 637 FOR MEDICARE OP): Performed by: PHYSICIAN ASSISTANT

## 2024-01-16 PROCEDURE — 80048 BASIC METABOLIC PNL TOTAL CA: CPT | Performed by: PHYSICIAN ASSISTANT

## 2024-01-16 PROCEDURE — 2500000004 HC RX 250 GENERAL PHARMACY W/ HCPCS (ALT 636 FOR OP/ED): Performed by: PHYSICIAN ASSISTANT

## 2024-01-16 PROCEDURE — 36415 COLL VENOUS BLD VENIPUNCTURE: CPT | Performed by: PHYSICIAN ASSISTANT

## 2024-01-16 PROCEDURE — 99232 SBSQ HOSP IP/OBS MODERATE 35: CPT | Performed by: NURSE PRACTITIONER

## 2024-01-16 PROCEDURE — 97161 PT EVAL LOW COMPLEX 20 MIN: CPT | Mod: GP

## 2024-01-16 PROCEDURE — 2500000001 HC RX 250 WO HCPCS SELF ADMINISTERED DRUGS (ALT 637 FOR MEDICARE OP): Performed by: HOSPITALIST

## 2024-01-16 PROCEDURE — 99233 SBSQ HOSP IP/OBS HIGH 50: CPT | Performed by: INTERNAL MEDICINE

## 2024-01-16 PROCEDURE — 97165 OT EVAL LOW COMPLEX 30 MIN: CPT | Mod: GO

## 2024-01-16 PROCEDURE — 1200000002 HC GENERAL ROOM WITH TELEMETRY DAILY

## 2024-01-16 RX ADMIN — HYDROMORPHONE HYDROCHLORIDE 0.5 MG: 1 INJECTION, SOLUTION INTRAMUSCULAR; INTRAVENOUS; SUBCUTANEOUS at 22:27

## 2024-01-16 RX ADMIN — Medication 6 MG: at 00:29

## 2024-01-16 RX ADMIN — HYDROMORPHONE HYDROCHLORIDE 0.5 MG: 1 INJECTION, SOLUTION INTRAMUSCULAR; INTRAVENOUS; SUBCUTANEOUS at 11:59

## 2024-01-16 RX ADMIN — GABAPENTIN 800 MG: 400 CAPSULE ORAL at 21:16

## 2024-01-16 RX ADMIN — SODIUM CHLORIDE 500 ML: 9 INJECTION, SOLUTION INTRAVENOUS at 21:16

## 2024-01-16 RX ADMIN — TOPIRAMATE 200 MG: 25 TABLET, FILM COATED ORAL at 21:16

## 2024-01-16 RX ADMIN — GABAPENTIN 800 MG: 400 CAPSULE ORAL at 08:46

## 2024-01-16 RX ADMIN — HYDROMORPHONE HYDROCHLORIDE 0.5 MG: 1 INJECTION, SOLUTION INTRAMUSCULAR; INTRAVENOUS; SUBCUTANEOUS at 15:44

## 2024-01-16 RX ADMIN — PANTOPRAZOLE SODIUM 40 MG: 40 TABLET, DELAYED RELEASE ORAL at 07:05

## 2024-01-16 RX ADMIN — ACETAMINOPHEN 650 MG: 325 TABLET ORAL at 08:46

## 2024-01-16 RX ADMIN — ACETAMINOPHEN 650 MG: 325 TABLET ORAL at 13:00

## 2024-01-16 RX ADMIN — TOPIRAMATE 100 MG: 25 TABLET, FILM COATED ORAL at 08:46

## 2024-01-16 RX ADMIN — GABAPENTIN 800 MG: 400 CAPSULE ORAL at 14:29

## 2024-01-16 RX ADMIN — ACETAMINOPHEN 650 MG: 325 TABLET ORAL at 00:29

## 2024-01-16 RX ADMIN — HYDROMORPHONE HYDROCHLORIDE 0.5 MG: 1 INJECTION, SOLUTION INTRAMUSCULAR; INTRAVENOUS; SUBCUTANEOUS at 02:44

## 2024-01-16 ASSESSMENT — COGNITIVE AND FUNCTIONAL STATUS - GENERAL
MOVING TO AND FROM BED TO CHAIR: TOTAL
DRESSING REGULAR LOWER BODY CLOTHING: A LOT
DRESSING REGULAR UPPER BODY CLOTHING: A LITTLE
PERSONAL GROOMING: A LITTLE
TURNING FROM BACK TO SIDE WHILE IN FLAT BAD: A LITTLE
MOBILITY SCORE: 12
TURNING FROM BACK TO SIDE WHILE IN FLAT BAD: A LITTLE
WALKING IN HOSPITAL ROOM: TOTAL
EATING MEALS: A LITTLE
DAILY ACTIVITIY SCORE: 15
PERSONAL GROOMING: A LITTLE
STANDING UP FROM CHAIR USING ARMS: A LITTLE
EATING MEALS: A LITTLE
MOBILITY SCORE: 11
DAILY ACTIVITIY SCORE: 15
MOVING TO AND FROM BED TO CHAIR: TOTAL
WALKING IN HOSPITAL ROOM: TOTAL
TOILETING: A LOT
MOVING FROM LYING ON BACK TO SITTING ON SIDE OF FLAT BED WITH BEDRAILS: A LITTLE
CLIMB 3 TO 5 STEPS WITH RAILING: TOTAL
STANDING UP FROM CHAIR USING ARMS: A LOT
DRESSING REGULAR UPPER BODY CLOTHING: A LITTLE
MOVING FROM LYING ON BACK TO SITTING ON SIDE OF FLAT BED WITH BEDRAILS: A LITTLE
HELP NEEDED FOR BATHING: A LOT
DRESSING REGULAR LOWER BODY CLOTHING: A LOT
CLIMB 3 TO 5 STEPS WITH RAILING: TOTAL
TOILETING: A LOT
HELP NEEDED FOR BATHING: A LOT

## 2024-01-16 ASSESSMENT — PAIN SCALES - GENERAL
PAINLEVEL_OUTOF10: 8
PAINLEVEL_OUTOF10: 3
PAINLEVEL_OUTOF10: 8
PAINLEVEL_OUTOF10: 8
PAINLEVEL_OUTOF10: 10 - WORST POSSIBLE PAIN
PAINLEVEL_OUTOF10: 0 - NO PAIN
PAINLEVEL_OUTOF10: 3
PAINLEVEL_OUTOF10: 10 - WORST POSSIBLE PAIN
PAINLEVEL_OUTOF10: 3
PAINLEVEL_OUTOF10: 10 - WORST POSSIBLE PAIN
PAINLEVEL_OUTOF10: 8
PAINLEVEL_OUTOF10: 3
PAINLEVEL_OUTOF10: 3

## 2024-01-16 ASSESSMENT — ACTIVITIES OF DAILY LIVING (ADL)
ADL_ASSISTANCE: INDEPENDENT
ADL_ASSISTANCE: INDEPENDENT

## 2024-01-16 ASSESSMENT — PAIN DESCRIPTION - ORIENTATION
ORIENTATION: RIGHT

## 2024-01-16 ASSESSMENT — PAIN DESCRIPTION - LOCATION
LOCATION: ANKLE
LOCATION: FOOT
LOCATION: LEG

## 2024-01-16 NOTE — PROGRESS NOTES
Roby Qureshi is a 78 y.o. female on day 1 of admission presenting with Back pain, unspecified back location, unspecified back pain laterality, unspecified chronicity.      Subjective   Patient was seen and examined at bedside this morning, stated that her living place is unlivable, and has been temporarily put in another apartment, and will need help upon discharge.  Patient has been admitted at [facility] multiple times mainly due to uncontrolled pain, despite being seen by pain management.       Objective     Last Recorded Vitals  BP 95/57 (BP Location: Right arm, Patient Position: Lying)   Pulse 68   Temp 36.6 °C (97.9 °F) (Oral)   Resp 18   Wt 61.2 kg (135 lb)   SpO2 97%   Intake/Output last 3 Shifts:    Intake/Output Summary (Last 24 hours) at 1/15/2024 2035  Last data filed at 1/15/2024 1500  Gross per 24 hour   Intake 600 ml   Output 1100 ml   Net -500 ml       Admission Weight  Weight: 61.2 kg (135 lb) (01/14/24 1026)    Daily Weight  01/14/24 : 61.2 kg (135 lb)    Image Results  XR ankle right 3+ views  Narrative: Interpreted By:  Maurice Hernandez,   STUDY:  XR ANKLE RIGHT 3+ VIEWS; ;  1/14/2024 4:30 pm      INDICATION:  Signs/Symptoms:post-reduction, please obtain AP, lateral, and mortise  views.      COMPARISON:  Study performed earlier the same day      ACCESSION NUMBER(S):  [id number]      ORDERING CLINICIAN:  LC WOODS      FINDINGS:  Right ankle, three views      Interval closed reduction and casting of right ankle subluxation as  well as of distal left fibular and medial malleolar fracture  deformities. There is improved alignment of the fracture fragment  with reduction of the ankle dislocation.      Impression: Improved alignment after closed reduction and casting of right ankle  bimalleolar fractures and ankle subluxation          MACRO:  None      Signed by: Maurice Hernandez 1/14/2024 4:32 PM  Dictation workstation:   [id number]  XR ankle right 2 views  Narrative:  Interpreted By:  Rosendo Saeed,   STUDY:  XR ANKLE RIGHT 2 VIEWS;  1/14/2024 2:47 pm      INDICATION:  Signs/Symptoms:Postreduction.      COMPARISON:  Radiographs from earlier on same date      ACCESSION NUMBER(S):  [id number]      ORDERING CLINICIAN:  BIENVENIDO WHITMORE      TECHNIQUE:  Two views of the right ankle      FINDINGS:  There is redemonstration of an similar appearance to displaced  oblique transsyndesmotic fracture of the distal fibula. Avulsion  fracturing of the medial malleolus is not well profiled. There is  improvement in the anatomic alignment to the tibiotalar subluxation  although there is persistent widening of the medial gutter of the  ankle joint. Possible osseous irregularity of the anterior margin of  the tibia is not well visualized on this exam. The posterior  malleolus of the tibia is not well assessed.      Impression: Improved anatomic alignment to tibiotalar subluxation with persistent  widening of the medial gutter. Other findings as above.      Signed by: Rosendo Saeed 1/14/2024 3:05 PM  Dictation workstation:   [id number]  XR tibia fibula right 2 views, XR ankle right 2 views, XR foot right 1-2 views  Narrative: Interpreted By:  Rosendo Saeed,   STUDY:  XR TIBIA FIBULA RIGHT 2 VIEWS; XR ANKLE RIGHT 2 VIEWS; XR FOOT RIGHT  1-2 VIEWS;  1/14/2024 1:10 pm      INDICATION:  Signs/Symptoms:Pain.      COMPARISON:  None.      ACCESSION NUMBER(S):  [id number]; [id number]; [id number]      ORDERING CLINICIAN:  BIENVENIDO WHITMORE      TECHNIQUE:  Three views of the right tibia and fibula, two views of the right  ankle and two views of the right foot.      FINDINGS:  There is oblique transsyndesmotic fracture of the distal fibula.  There is lateral displacement of the distal dominant fragment by  approximately 1.1 cm. There is avulsion fracturing off of the tip of  the medial malleolus. There may be avulsion fracturing off the  anterior margin of the tibia. There is anteromedial  subluxation of  the tibia relative to the talar dome with incongruent ankle mortise  and widening of the medial ankle gutter. There is limited assessment  of the posterior malleolus of the tibia on these exams.  There is an  os naviculare. 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 There is a small volume ankle  joint effusion. Mild degenerative changes seen of the knee as  visualized. There is soft tissue swelling about the ankle.      Impression: 1. Oblique transsyndesmotic fracture of the distal fibula with  displacement.  2. Avulsion fracturing of the medial malleolus.  3. Anteromedial subluxation of the tibia relative to the talar dome  with incongruent ankle mortise.  4. Possible fracturing of the anterior margin of the tibia.  5. Limited assessment of the posterior malleolus of the tibia on this  exam.      Signed by: Rosendo Saeed 1/14/2024 1:50 PM  Dictation workstation:   [id number]      Physical Exam  Constitutional: Elderly female, alert active, cooperative not in acute distress  Eyes: PERRLA, clear sclera  ENMT: Moist mucosal membranes, no exudate  Head / Neck: Atraumatic, normocephalic, supple neck, JVP not visualized  Lungs: Patent airways, CTABL  Heart: RRR, S1S2, no murmurs appreciated, palpable pulses in all extremities  GI: Soft, NT, ND, bowel sounds present in all quadrants  MSK: Moves all extremities freely with tenderness and limited ROM with rotation, sidebending flexion extension of the lumbar spine, no joint edema  Extremities: Intact x 4, no peripheral edema  : No Crouch catheter inserted  Breast: Deferred  Neurological: AAO x 3 to person, place and date, facial muscles symmetrical, sensation intact, strength 4/4, no acute focal neurological deficits appreciated  Psychological: Appropriate mood and behavior    Relevant Results           Scheduled medications  enoxaparin, 40 mg, subcutaneous, q24h  gabapentin, 800 mg, oral, TID  propofol, 0.5  mg/kg, intravenous, Once   And  ketamine, 0.5 mg/kg, intravenous, Once  metoprolol succinate XL, 25 mg, oral, Daily  pantoprazole, 40 mg, oral, Daily before breakfast   Or  pantoprazole, 40 mg, intravenous, Daily before breakfast  topiramate, 100 mg, oral, q AM  topiramate, 200 mg, oral, Nightly      Continuous medications  oxygen,       PRN medications  PRN medications: acetaminophen **OR** acetaminophen **OR** acetaminophen, doxepin, HYDROmorphone, ondansetron ODT **OR** ondansetron, oxyCODONE      Assessment/Plan        78 y.o. female presenting with intractable right lower back and leg pain, acute exacerbation of chronic back and leg pain, acute fracture of the distal fibula, acute ankle dislocation, unable to ambulate, unable to care for self, right leg weakness..  Patient with a history of chronic back pain and lumbar radiculopathy on the right with multiple injections, status postlaminectomy.  Patient is followed by pain management.  She has had multiple ED visits and hospitalizations for the leg and back pain     Principal Problem:    Back pain, unspecified back location, unspecified back pain laterality, unspecified chronicity  Active Problems:    Acute back pain, unspecified back location, unspecified back pain laterality    Acute right lumbar radiculopathy    Closed fracture of right ankle    Ankle dislocation, right, initial encounter    Unable to ambulate    Unable to care for self    Right leg weakness       Right bimalleolar acute bilateral ankle fracture  -Status post mechanical fall, reportedly multiple times at her place  -Limited METS due to chronic pain, but has no major cardiopulmonary comorbidities, most recent ECG reviewed, has history of arrhythmia but currently stable, chest x-ray (December 28, 2023) reviewed  -Patient with moderate risk, but medically optimized to proceed with surgery  -Anticipated surgery next Wednesday for ORIF of right ankle    Chronic back pain: Presenting with  excruciating sciatica pain  -History of multiple surgeries and epidural injections  -States following the surgeon for spinal nerve stimulation  -Dilaudid 0.4 mg IV push every 4 hours as needed severe pain  -Percocet 5-325 mg 1 tablet every 6 hours as needed breakthrough pain  -Cyclobenzaprine 10 mg 3 times daily as needed muscle spasm  -Continue duloxetine 60 mg daily  -Gabapentin 800 mg daily  -Pain management consulted: Impression pending  -PT OT: Impression pending     History of arrhythmia  -On metoprolol 25 mg daily     Depression  -Venlafaxine  mg daily  -Topiramate 100 mg daily, also off label use for pain management     Insomnia  -Zolpidem 5 mg at bedtime (caution with waleska return to use with opiate)     Diet  -Regular     DVT prophylaxis  -Lovenox 40 mg subcu daily     CODE STATUS: Full     Disposition: Presenting with chronic low back pain, need further evaluation and management, discharge pending pain management evaluation and recommendation, may need SNF if meets PT OT criteria for, otherwise will be discharged home with home health and PT OT and follow-up with pain management    Total time 40 minutes dedicated to educating patient and coordinating care    Bandar Soliman DO

## 2024-01-16 NOTE — PROGRESS NOTES
Discussed case with TCC. Pt is from [facility]. AMANDA called and spoke with pt daughter Bridget who is the pt's POA (confirmed with paperwork). Daughter states that pt lives in the independent portion of [facility] as pt can not afford to live in the [place] part. Daughter states pt does not take care of herself and has dirty diapers lying around apartment there. Daughter feels pt needs LTC but when this is addressed with pt, daughter states that pt becomes belligerent. Daughter said that she has tried to hire aids to assist pt but aids end up quitting.     ADDED 1401: AMANDA updated TCC on pt. AMANDA called and left message for Emma to determine if [place] would allow pt to return to [facility]. Waiting on call back from Emma.    Will follow.

## 2024-01-16 NOTE — CARE PLAN
The patient's goals for the shift include  safe    The clinical goals for the shift include pt will remain comfortable and hemodynamically stable      Problem: Pain - Adult  Goal: Verbalizes/displays adequate comfort level or baseline comfort level  Outcome: Progressing     Problem: Skin  Goal: Decreased wound size/increased tissue granulation at next dressing change  Outcome: Progressing     Problem: Skin  Goal: Participates in plan/prevention/treatment measures  Outcome: Progressing     Problem: Skin  Goal: Prevent/manage excess moisture  Outcome: Progressing     Problem: Pain  Goal: Turns in bed with improved pain control throughout the shift  Outcome: Progressing

## 2024-01-16 NOTE — PROGRESS NOTES
1/16/2024 2:19 PM Message left for son. I spoke with Emma from [facility]. She has not made a referral for SNF. Adriana NICHOLAS

## 2024-01-16 NOTE — PROGRESS NOTES
Roby Qureshi is a 78 y.o. female on day 2 of admission presenting with Back pain, unspecified back location, unspecified back pain laterality, unspecified chronicity.      Subjective   CHEN. Pain controlled with current regimen.Denies numbness in RLE       Objective     PE:  Constitutional: A&Ox3, calm and cooperative, NAD  Eyes: EOMI, clear sclera   Cardiovascular: Normal rate and regular rhythm. No murmurs  Respiratory/Thorax: CTAB, on RA  Genitourinary: Voiding independently via purewick- clear yellow  Musculoskeletal: RLE with SLS in place, able to wiggle toes, SILT distally. Adjacent compartments soft and compressible.   Neurological: A&Ox3, No focal deficits   Psychological: Appropriate mood and behavior      Last Recorded Vitals  Vitals:    01/16/24 0026 01/16/24 0238 01/16/24 0400 01/16/24 0746   BP: 96/63 105/66 (!) 94/45 96/63   BP Location:   Right arm    Patient Position:   Lying Lying   Pulse: 77 76 71 65   Resp: 18  18 18   Temp: 37.1 °C (98.7 °F)  36.4 °C (97.5 °F) 36.5 °C (97.7 °F)   TempSrc:       SpO2: 97%  97% 97%   Weight:       Height:             Relevant Results    Imaging:     .=== 01/14/24 ===    XR ANKLE 3+ VIEWS RIGHT    - Impression -  Improved alignment after closed reduction and casting of right ankle  bimalleolar fractures and ankle subluxation      MACRO:  None    Signed by: Maurice Hernandez 1/14/2024 4:32 PM  Dictation workstation:   [id number]   .=== 12/28/23 ===    CT ABDOMEN PELVIS WO IV CONTRAST    - Impression -  1. Liquid stool noted throughout the colon suggestive of a diarrheal  state.  No other acute process.  Correlate clinically for possible  mild colitis.  2. Cholelithiasis.  3. Mild hepatic steatosis.  Signed by Jersey Cloud MD         Lab Results:   Lab Results   Component Value Date    WBC 7.8 01/16/2024    HGB 10.5 (L) 01/16/2024    HCT 33.0 (L) 01/16/2024    MCV 95 01/16/2024     01/16/2024     Lab Results   Component Value Date    GLUCOSE 114 (H)  "01/16/2024    CALCIUM 8.1 (L) 01/16/2024     01/16/2024    K 3.6 01/16/2024    CO2 22 01/16/2024     (H) 01/16/2024    BUN 10 01/16/2024    CREATININE 0.60 01/16/2024     Results from last 72 hours   Lab Units 01/14/24  1115   ALK PHOS U/L 77   BILIRUBIN TOTAL mg/dL 0.9   PROTEIN TOTAL g/dL 6.5   ALT U/L 11   AST U/L 18   ALBUMIN g/dL 3.5     Estimated Creatinine Clearance: 65.2 mL/min (by C-G formula based on SCr of 0.6 mg/dL).  No results found for: \"CRP\"      Assessment:   Right Bimalleolar equivalent ankle fracture:    Plan:  -plan for ORIF right ankle Wednesday with Dr. Escudero  -Please obtain preop labs (CBC/BMP/Coags/T+S/CXR/EKG)  -Please document any further need for medical optimization prior to OR  -NPO after midnight 11/17  -DVT proph  lovenox per primary-please hold prior to OR  -Pain control per primary  -WB status: NWB RLE  - analgesics per primary      I spent 35 minutes in the professional and overall care of this patient.      ALLAN Saxena-CNP           "

## 2024-01-16 NOTE — PROGRESS NOTES
Occupational Therapy    Evaluation    Patient Name: Roby Qureshi  MRN: [id number]  Today's Date: 1/16/2024  Time Calculation  Start Time: 0958  Stop Time: 1020  Time Calculation (min): 22 min        Assessment:  OT Assessment: Pt presents with decreased strength, reduced balance & endurance with increased pain, impeding ADL performance and functional mobility. Pt would benefit from skilled OT services to address these deficits and facilitate highest level of function.  Prognosis: Fair  Barriers to Discharge: Decreased caregiver support  Evaluation/Treatment Tolerance: Patient limited by pain  Medical Staff Made Aware: Yes  End of Session Communication: Bedside nurse  End of Session Patient Position: Bed, 3 rail up, Alarm on  OT Assessment Results: Decreased ADL status, Decreased upper extremity strength, Decreased endurance, Decreased functional mobility, Decreased IADLs, Decreased trunk control for functional activities  Prognosis: Fair  Barriers to Discharge: Decreased caregiver support  Evaluation/Treatment Tolerance: Patient limited by pain  Medical Staff Made Aware: Yes  Strengths: Access to adaptive/assistive products, Attitude of self, Housing layout  Plan:  Treatment Interventions: ADL retraining, Functional transfer training, Endurance training, Neuromuscular reeducation, Compensatory technique education  OT Frequency: 3 times per week  OT Discharge Recommendations: Moderate intensity level of continued care  Equipment Recommended upon Discharge: Wheeled walker  OT Recommended Transfer Status: Minimal assist, Assist of 2  OT - OK to Discharge: Yes (Per POC)  Treatment Interventions: ADL retraining, Functional transfer training, Endurance training, Neuromuscular reeducation, Compensatory technique education    Subjective        General:  General  Reason for Referral: 79 y/o F presenting with intractable right back and leg painas well as fx of the distal fibula with dislocation  Referred By: Rachel BRIGHT  ANTHONY Garcia  Past Medical History Relevant to Rehab: Lumbar radiculopathy, chronic back pain, laminectomy, SVT  Family/Caregiver Present: No  Co-Treatment: PT  Co-Treatment Reason: Co-evaluation with PT to maximize pt safety, transfer ability, and participation.  Prior to Session Communication: Bedside nurse  Patient Position Received: Bed, 3 rail up  Preferred Learning Style: verbal, visual  General Comment: Pt supine in bed upon OT/PT arrival. Cleared to participate with RN, and agreeable to co-evaluation with PT/OT.  Precautions:  LE Weight Bearing Status: Right Non-Weight Bearing  Medical Precautions: Fall precautions  Precautions Comment: Unable/difficulty maintaining RLE precautions despite edu and ongoing cues.  Vital Signs:     Pain:  Pain Assessment  Pain Assessment: 0-10  Pain Score: 8  Pain Type: Acute pain  Pain Location: Ankle  Pain Orientation: Right  Pain Interventions: Repositioned, Ambulation/increased activity  Response to Interventions: No chage, 8/10 reported pain    Objective   Cognition:  Orientation Level: Oriented X4  Safety/Judgement: Exceptions to WFL (Decrease awareness of RLE precautions)           Home Living:  Type of Home: Independent living  Lives With: Alone  Home Adaptive Equipment: Walker rolling or standard (Rollator)  Home Layout: One level  Home Access: Elevator  Bathroom Shower/Tub: Walk-in shower  Bathroom Toilet: Standard  Bathroom Equipment: Grab bars in shower, Grab bars around toilet, Shower chair with back  Prior Function:  Level of [place]: Independent with ADLs and functional transfers, Needs assistance with homemaking  Receives Help From:  (Facility staff)  ADL Assistance: Independent  Homemaking Assistance: Needs assistance  Meal Prep:  (Reports uses ready meals from grocery stores and does not usually prepare meals.)  Laundry:  (Pt reports facility staff completes laundry)  Ambulatory Assistance:  (Mod IND with rollator)  Prior Function Comments: Pt reports  does not drive currently.     ADL:  LE Dressing Assistance: Maximal  LE Dressing Deficit: Setup, Verbal cueing, Supervision/safety, Don/doff L sock  Toileting Assistance with Device: Maximal  Toileting Deficit: Use of bedpan/urinal setup (Purewick in place)  Activity Tolerance:  Endurance: Decreased tolerance for upright activites  Bed Mobility/Transfers: Bed Mobility  Bed Mobility: Yes  Bed Mobility 1  Bed Mobility 1: Supine to sitting  Level of Assistance 1: Contact guard  Bed Mobility Comments 1: HOB elevated, pt able to progress LLE toward/off EOB. CGA for RLE with increased time to progress toward EOB. Pt able to use bed rail to bring trunk into upright sitting position.  Bed Mobility 2  Bed Mobility  2: Sitting to supine  Level of Assistance 2: Minimum assistance  Bed Mobility Comments 2: HOB lowered. Pt able to intiate BLE lift into bed but assist required to complete RLE lift into bed. Fair trunk control on descent to bed.  Bed Mobility 3  Bed Mobility 3: Scooting  Level of Assistance 3: Dependent  Bed Mobility Comments 3: Dependent 2-person scoot via chux pad toward HOB for better positioning in supine.    Transfers  Transfer: Yes  Transfer 1  Transfer From 1: Bed to  Transfer to 1: Stand  Technique 1: Sit to stand  Transfer Device 1: Walker, Gait belt  Transfer Level of Assistance 1: Minimum assistance, +2, Minimal verbal cues, Minimal tactile cues  Trials/Comments 1: VC/TC for BUE push from bed to stand. VC for RLE knee extension to adhere to weight-bearing precautions, however, unable to adhere. Pt in forward flexion at trunk in standing with difficulty maintaining NWB on RLE. Decreased standing tolerance to <30 seconds.  Transfers 2  Transfer From 2: Stand to  Transfer to 2: Bed  Technique 2: Stand to sit  Transfer Device 2: Walker (Gait belt)  Transfer Level of Assistance 2: Minimum assistance, +2  Trials/Comments 2: Assist for eccentric control on descent to bed. Pt with BUE gripping walker on descent  to bed.       Sitting Balance:  Static Sitting Balance  Static Sitting-Balance Support: Bilateral upper extremity supported, Feet supported  Static Sitting-Level of Assistance: Close supervision  Static Sitting-Comment/Number of Minutes: Able to tolerate sitting EOB ~3-5 minutes total.  Standing Balance:  Static Standing Balance  Static Standing-Balance Support: Bilateral upper extremity supported  Static Standing-Level of Assistance: Minimum assistance (x2 for steadying and to adhere to RLE precautions)  Static Standing-Comment/Number of Minutes: Decrease standing tolerance, total ~30 seconds before adamently requesting to sit.      Vision:Vision - Basic Assessment  Current Vision: No visual deficits     Strength:  Strength Comments: WFL  Coordination:  Movements are Fluid and Coordinated: Yes  Coordination Comment: B UE coordination WFL   Hand Function:  Gross Grasp: Functional  Coordination: Functional  Extremities: RUE   RUE : Within Functional Limits (4-/5 distally) and LUE   LUE: Within Functional Limits (4-/5 distally)      Outcome Measures:[facility] Daily Activity  Putting on and taking off regular lower body clothing: A lot  Bathing (including washing, rinsing, drying): A lot  Putting on and taking off regular upper body clothing: A little  Toileting, which includes using toilet, bedpan or urinal: A lot  Taking care of personal grooming such as brushing teeth: A little  Eating Meals: A little  Daily Activity - Total Score: 15        Education Documentation  Precautions, taught by Itzel Heard OT at 1/16/2024 11:37 AM.  Learner: Patient  Readiness: Acceptance  Method: Explanation, Demonstration  Response: Needs Reinforcement    Handouts, taught by Itzel Heard OT at 1/16/2024 11:37 AM.  Learner: Patient  Readiness: Acceptance  Method: Explanation, Demonstration  Response: Needs Reinforcement    ADL Training, taught by Itzel Heard OT at 1/16/2024 11:37 AM.  Learner: Patient  Readiness:  Acceptance  Method: Explanation, Demonstration  Response: Needs Reinforcement    Education Comments  No comments found.        OP EDUCATION:       Goals:  Encounter Problems       Encounter Problems (Active)       ADLs       Patient will perform upper and lower body bathing with minimal assist  level of assistance and grab bars and shower chair. (Progressing)       Start:  01/16/24    Expected End:  01/30/24            Patient with complete lower body dressing with minimal assist  level of assistance donning and doffing all LE clothes  with PRN adaptive equipment while edge of bed  (Progressing)       Start:  01/16/24    Expected End:  01/30/24            Patient will complete daily grooming tasks with stand by assist level of assistance and PRN adaptive equipment while standing. (Progressing)       Start:  01/16/24    Expected End:  01/30/24            Patient will complete toileting including hygiene clothing management/hygiene with minimal assist  level of assistance and raised toilet seat and grab bars. (Progressing)       Start:  01/16/24    Expected End:  01/30/24               BALANCE       Patient will tolerate standing for >=3 minutes to stand by assist level of assistance with least restrictive device in order to improve functional activity tolerance for ADL tasks. (Not Progressing)       Start:  01/16/24    Expected End:  01/30/24                       COGNITION/SAFETY       Patient will recall and adhere to weight bearing restrictions with all ADL and functional mobility in order to promote healing and safety with functional tasks (Not Progressing)       Start:  01/16/24    Expected End:  01/30/24               MOBILITY       Patient will perform Functional mobility x Household distances/Community Distances with stand by assist level of assistance and least restrictive device in order to improve safety and functional mobility. (Not Progressing)       Start:  01/16/24    Expected End:  01/30/24                        TRANSFERS       Patient will complete functional transfer to [facility] with least restrictive device with stand by assist level of assistance. (Progressing)       Start:  01/16/24    Expected End:  01/30/24            Patient will complete sit to stand transfer with stand by assist level of assistance and least restrictive device in order to improve safety and prepare for out of bed mobility. (Progressing)       Start:  01/16/24    Expected End:  01/30/24

## 2024-01-16 NOTE — PROGRESS NOTES
Roby Qureshi is a 78 y.o. female on day 2 of admission presenting with Back pain, unspecified back location, unspecified back pain laterality, unspecified chronicity.      Subjective   Patient was seen and examined at bedside this morning, stated that her living place is unlivable, and has been temporarily put in another apartment, and will need help upon discharge.  Patient has been admitted at [facility] multiple times mainly due to uncontrolled pain, despite being seen by pain management.       Objective     Last Recorded Vitals  BP (!) 110/47 (BP Location: Right arm, Patient Position: Lying)   Pulse 71   Temp 36.5 °C (97.7 °F)   Resp 18   Wt 61.2 kg (135 lb)   SpO2 97%   Intake/Output last 3 Shifts:    Intake/Output Summary (Last 24 hours) at 1/16/2024 1352  Last data filed at 1/16/2024 0700  Gross per 24 hour   Intake 1190 ml   Output 950 ml   Net 240 ml         Admission Weight  Weight: 61.2 kg (135 lb) (01/14/24 1026)    Daily Weight  01/14/24 : 61.2 kg (135 lb)    Image Results  XR ankle right 3+ views  Narrative: Interpreted By:  Maurice Hernandez,   STUDY:  XR ANKLE RIGHT 3+ VIEWS; ;  1/14/2024 4:30 pm      INDICATION:  Signs/Symptoms:post-reduction, please obtain AP, lateral, and mortise  views.      COMPARISON:  Study performed earlier the same day      ACCESSION NUMBER(S):  [id number]      ORDERING CLINICIAN:  LC WOODS      FINDINGS:  Right ankle, three views      Interval closed reduction and casting of right ankle subluxation as  well as of distal left fibular and medial malleolar fracture  deformities. There is improved alignment of the fracture fragment  with reduction of the ankle dislocation.      Impression: Improved alignment after closed reduction and casting of right ankle  bimalleolar fractures and ankle subluxation          MACRO:  None      Signed by: Maurice Hernandez 1/14/2024 4:32 PM  Dictation workstation:   [id number]  XR ankle right 2 views  Narrative:  Interpreted By:  Rosendo Saeed,   STUDY:  XR ANKLE RIGHT 2 VIEWS;  1/14/2024 2:47 pm      INDICATION:  Signs/Symptoms:Postreduction.      COMPARISON:  Radiographs from earlier on same date      ACCESSION NUMBER(S):  [id number]      ORDERING CLINICIAN:  BIENVENIDO WHITMORE      TECHNIQUE:  Two views of the right ankle      FINDINGS:  There is redemonstration of an similar appearance to displaced  oblique transsyndesmotic fracture of the distal fibula. Avulsion  fracturing of the medial malleolus is not well profiled. There is  improvement in the anatomic alignment to the tibiotalar subluxation  although there is persistent widening of the medial gutter of the  ankle joint. Possible osseous irregularity of the anterior margin of  the tibia is not well visualized on this exam. The posterior  malleolus of the tibia is not well assessed.      Impression: Improved anatomic alignment to tibiotalar subluxation with persistent  widening of the medial gutter. Other findings as above.      Signed by: Rosendo Saeed 1/14/2024 3:05 PM  Dictation workstation:   [id number]  XR tibia fibula right 2 views, XR ankle right 2 views, XR foot right 1-2 views  Narrative: Interpreted By:  Rosendo Saeed,   STUDY:  XR TIBIA FIBULA RIGHT 2 VIEWS; XR ANKLE RIGHT 2 VIEWS; XR FOOT RIGHT  1-2 VIEWS;  1/14/2024 1:10 pm      INDICATION:  Signs/Symptoms:Pain.      COMPARISON:  None.      ACCESSION NUMBER(S):  [id number]; [id number]; [id number]      ORDERING CLINICIAN:  BIENVENIDO WHITMORE      TECHNIQUE:  Three views of the right tibia and fibula, two views of the right  ankle and two views of the right foot.      FINDINGS:  There is oblique transsyndesmotic fracture of the distal fibula.  There is lateral displacement of the distal dominant fragment by  approximately 1.1 cm. There is avulsion fracturing off of the tip of  the medial malleolus. There may be avulsion fracturing off the  anterior margin of the tibia. There is anteromedial  subluxation of  the tibia relative to the talar dome with incongruent ankle mortise  and widening of the medial ankle gutter. There is limited assessment  of the posterior malleolus of the tibia on these exams.  There is an  os naviculare. 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 There is a small volume ankle  joint effusion. Mild degenerative changes seen of the knee as  visualized. There is soft tissue swelling about the ankle.      Impression: 1. Oblique transsyndesmotic fracture of the distal fibula with  displacement.  2. Avulsion fracturing of the medial malleolus.  3. Anteromedial subluxation of the tibia relative to the talar dome  with incongruent ankle mortise.  4. Possible fracturing of the anterior margin of the tibia.  5. Limited assessment of the posterior malleolus of the tibia on this  exam.      Signed by: Rosendo Saeed 1/14/2024 1:50 PM  Dictation workstation:   [id number]      Physical Exam  Constitutional: Elderly female, alert active, cooperative not in acute distress  Eyes: PERRLA, clear sclera  ENMT: Moist mucosal membranes, no exudate  Head / Neck: Atraumatic, normocephalic, supple neck, JVP not visualized  Lungs: Patent airways, CTABL  Heart: RRR, S1S2, no murmurs appreciated, palpable pulses in all extremities  GI: Soft, NT, ND, bowel sounds present in all quadrants  MSK: Moves all extremities freely with tenderness and limited ROM with rotation, sidebending flexion extension of the lumbar spine, no joint edema  Extremities: Intact x 4, no peripheral edema  : No Crouch catheter inserted  Breast: Deferred  Neurological: AAO x 3 to person, place and date, facial muscles symmetrical, sensation intact, strength 4/4, no acute focal neurological deficits appreciated  Psychological: Appropriate mood and behavior    Relevant Results           Scheduled medications  enoxaparin, 40 mg, subcutaneous, q24h  gabapentin, 800 mg, oral, TID  propofol, 0.5  mg/kg, intravenous, Once   And  ketamine, 0.5 mg/kg, intravenous, Once  metoprolol succinate XL, 25 mg, oral, Daily  pantoprazole, 40 mg, oral, Daily before breakfast   Or  pantoprazole, 40 mg, intravenous, Daily before breakfast  topiramate, 100 mg, oral, q AM  topiramate, 200 mg, oral, Nightly      Continuous medications  oxygen,       PRN medications  PRN medications: acetaminophen **OR** acetaminophen **OR** acetaminophen, doxepin, HYDROmorphone, ondansetron ODT **OR** ondansetron, oxyCODONE      Assessment/Plan        78 y.o. female presenting with intractable right lower back and leg pain, acute exacerbation of chronic back and leg pain, acute fracture of the distal fibula, acute ankle dislocation, unable to ambulate, unable to care for self, right leg weakness..  Patient with a history of chronic back pain and lumbar radiculopathy on the right with multiple injections, status postlaminectomy.  Patient is followed by pain management.  She has had multiple ED visits and hospitalizations for the leg and back pain     Principal Problem:    Back pain, unspecified back location, unspecified back pain laterality, unspecified chronicity  Active Problems:    Acute back pain, unspecified back location, unspecified back pain laterality    Acute right lumbar radiculopathy    Closed fracture of right ankle    Ankle dislocation, right, initial encounter    Unable to ambulate    Unable to care for self    Right leg weakness       Right bimalleolar acute bilateral ankle fracture  -Status post mechanical fall, reportedly multiple times at her place  -Limited METS due to chronic pain, but has no major cardiopulmonary comorbidities, most recent ECG reviewed, has history of arrhythmia but currently stable, chest x-ray (December 28, 2023) reviewed  -Patient with moderate risk, but medically optimized to proceed with surgery  -Anticipated surgery Wednesday for ORIF of right ankle    Chronic back pain: Presenting with excruciating  sciatica pain  -History of multiple surgeries and epidural injections  -States following the surgeon for spinal nerve stimulation  -Dilaudid 0.4 mg IV push every 4 hours as needed severe pain  -Percocet 5-325 mg 1 tablet every 6 hours as needed breakthrough pain  -Cyclobenzaprine 10 mg 3 times daily as needed muscle spasm  -Continue duloxetine 60 mg daily  -Gabapentin 800 mg daily  -Pain management consulted: Impression pending     History of arrhythmia  -On metoprolol 25 mg daily     Depression  -Venlafaxine  mg daily  -Topiramate 100 mg daily, also off label use for pain management     Insomnia  -Zolpidem 5 mg at bedtime (caution with waleska return to use with opiate)     Diet  -Regular     DVT prophylaxis  -Lovenox 40 mg subcu daily     CODE STATUS: Full     Disposition: Presenting with chronic low back pain, need further evaluation and management, discharge pending pain management evaluation and recommendation. Plan for ankle surgery tomorrow.      Destiny Schilling MD

## 2024-01-16 NOTE — NURSING NOTE
2100 BP 89/57   Pt complaining of pain and asking for dilaudid. Pt not able to receive at this time due to blood pressure. Oxycodone given instead. Bolus requested and ordered of 500 ml total, 250 ml/hr.     2300 BP 97/56  Bolus not yet completed, but pressure is still lower than usual. Pt now okay with not receiving dilaudid, but wants her doxepin. States that nothing else helps her sleep like her doxepin. Per this RN, pharmacy, and Babita TALLEY, this patient's BP is too low to receive it. Pt continuously requests this while often yelling into the hallway. Per pharmacy and Babita TALLEY, pt is already prescribed a higher dose than recommended at this time for home use.     0026 BP 96/63  Bolus complete with little improvement in BP. BP still too low to give either dilaudid or doxepin. Tylenol and melatonin given in place. Pt appears to be resting.     0238 /66  Pt asking for dilaudid and given dilaudid for pain.

## 2024-01-16 NOTE — CONSULTS
"  Wound Care Consult     Visit Date: 1/16/2024      Patient Name: Roby Qureshi         MRN: [id number]           YOB: 1945     Reason for Consult: Assessment complete. Additional wound care supplies left at bedside.      Wound History: Patient reports wound being present for \"a while\"      Pertinent Labs:   Albumin   Date Value Ref Range Status   01/14/2024 3.5 3.4 - 5.0 g/dL Final   12/18/2019 4.1 3.4 - 5.0 g/dL Final       Wound Assessment:  Wound 01/15/24 Buttocks Right;Medial (Active)   Wound Image   01/16/24 1240   Site Assessment Red;Yellow 01/16/24 1240   Jona-Wound Assessment Intact 01/16/24 1240   Non-staged Wound Description Full thickness 01/16/24 1240   Pressure Injury Stage 3 01/16/24 1240   Shape Round 01/16/24 1240   Wound Length (cm) 2 cm 01/16/24 1240   Wound Width (cm) 1.5 cm 01/16/24 1240   Wound Surface Area (cm^2) 3 cm^2 01/16/24 1240   Wound Depth (cm) 0.4 cm 01/16/24 1240   Wound Volume (cm^3) 1.2 cm^3 01/16/24 1240   Margins Attached edges 01/15/24 1121   Drainage Description Yellow 01/15/24 1121   Drainage Amount Scant 01/15/24 1121   Dressing Foam 01/15/24 1121   Dressing Changed New 01/15/24 1121   Dressing Status Clean;Dry 01/15/24 1121       Wound Team Summary Assessment:Notified Dr. Schilling of wound care recommendations     Right buttocks-Stage 3 PI POA  Irrigate with normal saline or wound cleanser, Pat dry.  Apply no sting skin barrier (cavilon) to jona wound   Apply Medihoney ointment to wound bed  Apply hydrofera blue ready (apply writing facing up)    Cover with Mepilex border dressing   Change every other day and as needed       Wound Team Plan: Please re-consult wound/ostomy care for any changes or concerns    While in bed patient should only be on one fitted sheet, and one chux. Please, do not use brief while patient is resting in bed. Elevate heels off the bed surface at all times. Turn and reposition at least every 2 hours.     Thank you for this " consultations, while inpatient please contact with any questions or changes in condition.       Milka Abreu RN BSN,[facility]  [phone]   1/16/2024  3:36 PM

## 2024-01-16 NOTE — PROGRESS NOTES
Physical Therapy    Physical Therapy Evaluation    Patient Name: Roby Qureshi  MRN: [id number]  Today's Date: 1/16/2024   Time Calculation  Start Time: 0957  Stop Time: 1019  Time Calculation (min): 22 min    Assessment/Plan   PT Assessment  PT Assessment Results: Decreased strength, Decreased endurance, Impaired balance, Decreased mobility, Pain  Rehab Prognosis: Good  Evaluation/Treatment Tolerance: Patient limited by pain  Medical Staff Made Aware: Yes  Strengths: Ability to acquire knowledge, Access to adaptive/assistive products  Barriers to Participation: Attitude of self  End of Session Communication: Bedside nurse  Assessment Comment: Pt presents today with deficits in strength, balance, and activity tolerance. Currently, pt is below the reported PLOF requring min assist for bed mobility and transfers. Pt also limited by elevated R ankle pain. Continued PT during the current admission would beneift the pt to address the above factors and bring pt closer to the PLOF.  End of Session Patient Position: Bed, 3 rail up, Alarm on  IP OR SWING BED PT PLAN  Inpatient or Swing Bed: Inpatient  PT Plan  Treatment/Interventions: Bed mobility, Transfer training, Gait training, Balance training, Strengthening, Endurance training, Therapeutic exercise, Therapeutic activity, Home exercise program, Positioning  PT Plan: Skilled PT  PT Frequency: 4 times per week  PT Discharge Recommendations: Moderate intensity level of continued care  Equipment Recommended upon Discharge: Wheeled walker  PT Recommended Transfer Status: Assist x2, Assistive device  PT - OK to Discharge: Yes (Per PT POC)      Subjective   General Visit Information:  General  Reason for Referral: 79 y/o F presenting with intractable right back and leg painas well as fx of the distal fibula with dislocation  Referred By: WAYLON Schilling  Past Medical History Relevant to Rehab: Lumbar radiculopathy, chronic back pain, laminectomy, SVT  Family/Caregiver Present:  No  Co-Treatment: OT  Co-Treatment Reason: Co-evaluation with OT to maximize pt safety, transfer ability, and participation.  Prior to Session Communication: Bedside nurse  Patient Position Received: Bed, 3 rail up  Preferred Learning Style: verbal, visual  General Comment: Pt supine in bed upon PT arrival. Cleared to participate with RN, and agreeable to co-evaluation with PT/OT.  Home Living:  Home Living  Type of Home: Independent living  Lives With: Alone  Home Adaptive Equipment: Walker rolling or standard (Rollator)  Home Layout: One level  Home Access: Elevator  Bathroom Shower/Tub: Walk-in shower  Bathroom Toilet: Standard  Bathroom Equipment: Grab bars in shower, Grab bars around toilet, Shower chair with back  Prior Level of Function:  Prior Function Per Pt/Caregiver Report  Level of [place]: Independent with ADLs and functional transfers, Needs assistance with homemaking  Receives Help From:  (Facility staff)  ADL Assistance: Independent  Homemaking Assistance: Needs assistance  Laundry:  (Pt reports facility staff completes laundry)  Ambulatory Assistance:  (Mod IND with rollator)  Prior Function Comments: Pt reports eating ready-made meals but does not usually prepare meals  Precautions:  Precautions  LE Weight Bearing Status: Right Non-Weight Bearing  Medical Precautions: Fall precautions    Objective   Pain:  Pain Assessment  Pain Assessment: 0-10  Pain Score: 8  Pain Type: Acute pain  Pain Location: Ankle  Pain Orientation: Right  Pain Interventions: Ambulation/increased activity  Response to Interventions: no change (8/10 pain reported)  Cognition:  Cognition  Orientation Level: Oriented X4    General Assessments:  General Observation  General Observation: Pt requires encouragement to participate in session.    Activity Tolerance  Endurance: Decreased tolerance for upright activites    Coordination  Movements are Fluid and Coordinated: Yes    Postural Control  Postural Control:  Impaired  Posture Comment: Forward flexion at trunk in standing with FWW    Static Sitting Balance  Static Sitting-Balance Support: Bilateral upper extremity supported  Static Sitting-Level of Assistance: Close supervision    Static Standing Balance  Static Standing-Balance Support: Bilateral upper extremity supported  Functional Assessments:  Bed Mobility  Bed Mobility: Yes  Bed Mobility 1  Bed Mobility 1: Supine to sitting  Level of Assistance 1: Contact guard  Bed Mobility Comments 1: HOB elevated. Pt able to progress LLE toward/off EOB. CGA to RLE with increased time to progress of the EOB. Pt able to use bed rail to bring trunk into upright sitting position.  Bed Mobility 2  Bed Mobility  2: Sitting to supine  Level of Assistance 2: Minimum assistance  Bed Mobility Comments 2: HOB lowered. Pt able to intiate BLE lift into bed but assist required to complete RLE lift into bed. Fair trunk control on descent to bed.  Bed Mobility 3  Bed Mobility 3: Scooting  Level of Assistance 3: Dependent  Bed Mobility Comments 3: Dependent 2-person scoot via chux pad toward HOB for better positioning in supine.    Transfers  Transfer: Yes  Transfer 1  Transfer From 1: Bed to  Transfer to 1: Stand  Technique 1: Sit to stand  Transfer Device 1: Walker, Gait belt  Transfer Level of Assistance 1: Minimum assistance, +2, Minimal verbal cues, Minimal tactile cues  Trials/Comments 1: VC/TC for BUE push from bed to stand. VC for RLE knee extension to adhere to weight-bearing precautions. Pt in forward flexion at trunk in standing and unable to maintain NWB on RLE. Decreased standing tolerance to <30 seconds.  Transfers 2  Transfer From 2: Stand to  Transfer to 2: Bed  Technique 2: Stand to sit  Transfer Device 2: Walker (Gait belt)  Transfer Level of Assistance 2: Minimum assistance, +2  Trials/Comments 2: Assist for eccentric control on descent to bed. Pt with BUE gripping walker on descent to bed.    Ambulation/Gait  Training  Ambulation/Gait Training Performed: No    Stairs  Stairs: No  Extremity/Trunk Assessments:  RLE   RLE : Exceptions to WFL  Strength RLE  R Hip Flexion: 3/5  R Knee Extension: 3+/5  LLE   LLE : Exceptions to WFL  Strength LLE  L Hip Flexion: 3+/5  L Knee Extension: 3/5  L Ankle Dorsiflexion: 3+/5  L Ankle Plantar Flexion: 3+/5  Outcome Measures:  [facility] Basic Mobility  Turning from your back to your side while in a flat bed without using bedrails: A little  Moving from lying on your back to sitting on the side of a flat bed without using bedrails: A little  Moving to and from bed to chair (including a wheelchair): Total  Standing up from a chair using your arms (e.g. wheelchair or bedside chair): A little  To walk in hospital room: Total  Climbing 3-5 steps with railing: Total  Basic Mobility - Total Score: 12    Encounter Problems       Encounter Problems (Active)       Balance       Goal 1 (Progressing)       Start:  01/16/24    Expected End:  01/30/24       Pt performs all sitting balance with close supervision and standing balance with CGA and LRAD            Mobility       STG - Patient will ambulate (Not Progressing)       Start:  01/16/24    Expected End:  01/30/24       15 ft with CGA and LRAD while adhering to relevant weight-bearing precautions            PT Problem       PT Goal 1 (Not Progressing)       Start:  01/16/24    Expected End:  01/30/24       Pt demonstrates IND in performing 2 sets of 15 reps of prescribed BLE            Transfers       STG - Patient to transfer to and from sit to supine (Progressing)       Start:  01/16/24    Expected End:  01/30/24       With close supervision         STG - Patient will transfer sit to and from stand (Progressing)       Start:  01/16/24    Expected End:  01/30/24       With min assist x 1 and LRAD                Education Documentation  Precautions, taught by Joey Allen PT at 1/16/2024 11:21 AM.  Learner: Patient  Readiness:  Acceptance  Method: Explanation, Demonstration  Response: Needs Reinforcement    Body Mechanics, taught by Joey Allen PT at 1/16/2024 11:21 AM.  Learner: Patient  Readiness: Acceptance  Method: Explanation, Demonstration  Response: Needs Reinforcement    Mobility Training, taught by Joey Allen PT at 1/16/2024 11:21 AM.  Learner: Patient  Readiness: Acceptance  Method: Explanation, Demonstration  Response: Needs Reinforcement    Education Comments  No comments found.
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
## 2024-01-17 ENCOUNTER — ANESTHESIA EVENT (OUTPATIENT)
Dept: OPERATING ROOM | Facility: HOSPITAL | Age: 79
DRG: 494 | End: 2024-01-17
Payer: COMMERCIAL

## 2024-01-17 ENCOUNTER — ANESTHESIA (OUTPATIENT)
Dept: OPERATING ROOM | Facility: HOSPITAL | Age: 79
DRG: 494 | End: 2024-01-17
Payer: COMMERCIAL

## 2024-01-17 ENCOUNTER — APPOINTMENT (OUTPATIENT)
Dept: RADIOLOGY | Facility: HOSPITAL | Age: 79
DRG: 494 | End: 2024-01-17
Payer: COMMERCIAL

## 2024-01-17 PROBLEM — Z86.69 HISTORY OF MIGRAINE HEADACHES: Status: ACTIVE | Noted: 2024-01-17

## 2024-01-17 LAB
ANION GAP SERPL CALC-SCNC: 13 MMOL/L (ref 10–20)
BACTERIA UR CULT: ABNORMAL
BUN SERPL-MCNC: 9 MG/DL (ref 6–23)
CALCIUM SERPL-MCNC: 8.5 MG/DL (ref 8.6–10.3)
CHLORIDE SERPL-SCNC: 111 MMOL/L (ref 98–107)
CO2 SERPL-SCNC: 19 MMOL/L (ref 21–32)
CREAT SERPL-MCNC: 0.53 MG/DL (ref 0.5–1.05)
EGFRCR SERPLBLD CKD-EPI 2021: >90 ML/MIN/1.73M*2
ERYTHROCYTE [DISTWIDTH] IN BLOOD BY AUTOMATED COUNT: 15.8 % (ref 11.5–14.5)
GLUCOSE SERPL-MCNC: 99 MG/DL (ref 74–99)
HCT VFR BLD AUTO: 36.6 % (ref 36–46)
HGB BLD-MCNC: 10.5 G/DL (ref 12–16)
MCH RBC QN AUTO: 29.4 PG (ref 26–34)
MCHC RBC AUTO-ENTMCNC: 28.7 G/DL (ref 32–36)
MCV RBC AUTO: 103 FL (ref 80–100)
NRBC BLD-RTO: 0 /100 WBCS (ref 0–0)
PLATELET # BLD AUTO: 237 X10*3/UL (ref 150–450)
POTASSIUM SERPL-SCNC: 3.8 MMOL/L (ref 3.5–5.3)
RBC # BLD AUTO: 3.57 X10*6/UL (ref 4–5.2)
SODIUM SERPL-SCNC: 139 MMOL/L (ref 136–145)
WBC # BLD AUTO: 8.2 X10*3/UL (ref 4.4–11.3)

## 2024-01-17 PROCEDURE — 2500000005 HC RX 250 GENERAL PHARMACY W/O HCPCS: Performed by: ANESTHESIOLOGY

## 2024-01-17 PROCEDURE — 2500000004 HC RX 250 GENERAL PHARMACY W/ HCPCS (ALT 636 FOR OP/ED): Performed by: EMERGENCY MEDICINE

## 2024-01-17 PROCEDURE — 7100000002 HC RECOVERY ROOM TIME - EACH INCREMENTAL 1 MINUTE: Performed by: STUDENT IN AN ORGANIZED HEALTH CARE EDUCATION/TRAINING PROGRAM

## 2024-01-17 PROCEDURE — 3700000002 HC GENERAL ANESTHESIA TIME - EACH INCREMENTAL 1 MINUTE: Performed by: STUDENT IN AN ORGANIZED HEALTH CARE EDUCATION/TRAINING PROGRAM

## 2024-01-17 PROCEDURE — 3600000004 HC OR TIME - INITIAL BASE CHARGE - PROCEDURE LEVEL FOUR: Performed by: STUDENT IN AN ORGANIZED HEALTH CARE EDUCATION/TRAINING PROGRAM

## 2024-01-17 PROCEDURE — A27814 PR OPEN TREATMENT BIMALLEOLAR ANKLE FRACTURE: Performed by: NURSE ANESTHETIST, CERTIFIED REGISTERED

## 2024-01-17 PROCEDURE — 3700000001 HC GENERAL ANESTHESIA TIME - INITIAL BASE CHARGE: Performed by: STUDENT IN AN ORGANIZED HEALTH CARE EDUCATION/TRAINING PROGRAM

## 2024-01-17 PROCEDURE — 85027 COMPLETE CBC AUTOMATED: CPT | Performed by: PHYSICIAN ASSISTANT

## 2024-01-17 PROCEDURE — 7100000001 HC RECOVERY ROOM TIME - INITIAL BASE CHARGE: Performed by: STUDENT IN AN ORGANIZED HEALTH CARE EDUCATION/TRAINING PROGRAM

## 2024-01-17 PROCEDURE — 2500000005 HC RX 250 GENERAL PHARMACY W/O HCPCS: Performed by: NURSE ANESTHETIST, CERTIFIED REGISTERED

## 2024-01-17 PROCEDURE — 2500000004 HC RX 250 GENERAL PHARMACY W/ HCPCS (ALT 636 FOR OP/ED): Performed by: NURSE ANESTHETIST, CERTIFIED REGISTERED

## 2024-01-17 PROCEDURE — 99100 ANES PT EXTEME AGE<1 YR&>70: CPT | Performed by: ANESTHESIOLOGY

## 2024-01-17 PROCEDURE — 2500000004 HC RX 250 GENERAL PHARMACY W/ HCPCS (ALT 636 FOR OP/ED): Performed by: PHYSICIAN ASSISTANT

## 2024-01-17 PROCEDURE — 3600000009 HC OR TIME - EACH INCREMENTAL 1 MINUTE - PROCEDURE LEVEL FOUR: Performed by: STUDENT IN AN ORGANIZED HEALTH CARE EDUCATION/TRAINING PROGRAM

## 2024-01-17 PROCEDURE — 99233 SBSQ HOSP IP/OBS HIGH 50: CPT | Performed by: INTERNAL MEDICINE

## 2024-01-17 PROCEDURE — 2500000004 HC RX 250 GENERAL PHARMACY W/ HCPCS (ALT 636 FOR OP/ED): Performed by: ANESTHESIOLOGY

## 2024-01-17 PROCEDURE — 2780000003 HC OR 278 NO HCPCS: Performed by: STUDENT IN AN ORGANIZED HEALTH CARE EDUCATION/TRAINING PROGRAM

## 2024-01-17 PROCEDURE — 27814 TREATMENT OF ANKLE FRACTURE: CPT | Performed by: STUDENT IN AN ORGANIZED HEALTH CARE EDUCATION/TRAINING PROGRAM

## 2024-01-17 PROCEDURE — 2720000007 HC OR 272 NO HCPCS: Performed by: STUDENT IN AN ORGANIZED HEALTH CARE EDUCATION/TRAINING PROGRAM

## 2024-01-17 PROCEDURE — 2500000001 HC RX 250 WO HCPCS SELF ADMINISTERED DRUGS (ALT 637 FOR MEDICARE OP): Performed by: PHYSICIAN ASSISTANT

## 2024-01-17 PROCEDURE — 64447 NJX AA&/STRD FEMORAL NRV IMG: CPT | Performed by: ANESTHESIOLOGY

## 2024-01-17 PROCEDURE — 76000 FLUOROSCOPY <1 HR PHYS/QHP: CPT | Mod: RT

## 2024-01-17 PROCEDURE — 1200000002 HC GENERAL ROOM WITH TELEMETRY DAILY

## 2024-01-17 PROCEDURE — 80051 ELECTROLYTE PANEL: CPT | Performed by: PHYSICIAN ASSISTANT

## 2024-01-17 PROCEDURE — A27814 PR OPEN TREATMENT BIMALLEOLAR ANKLE FRACTURE: Performed by: ANESTHESIOLOGY

## 2024-01-17 PROCEDURE — 36415 COLL VENOUS BLD VENIPUNCTURE: CPT | Performed by: PHYSICIAN ASSISTANT

## 2024-01-17 PROCEDURE — 0QSJ04Z REPOSITION RIGHT FIBULA WITH INTERNAL FIXATION DEVICE, OPEN APPROACH: ICD-10-PCS | Performed by: STUDENT IN AN ORGANIZED HEALTH CARE EDUCATION/TRAINING PROGRAM

## 2024-01-17 PROCEDURE — C1713 ANCHOR/SCREW BN/BN,TIS/BN: HCPCS | Performed by: STUDENT IN AN ORGANIZED HEALTH CARE EDUCATION/TRAINING PROGRAM

## 2024-01-17 PROCEDURE — 64445 NJX AA&/STRD SCIATIC NRV IMG: CPT | Performed by: ANESTHESIOLOGY

## 2024-01-17 DEVICE — IMPLANTABLE DEVICE: Type: IMPLANTABLE DEVICE | Site: ANKLE | Status: FUNCTIONAL

## 2024-01-17 DEVICE — SCREW, LOW PROFILE, CORTICAL, 3.5 X 12MM. SS: Type: IMPLANTABLE DEVICE | Site: ANKLE | Status: FUNCTIONAL

## 2024-01-17 RX ORDER — LIDOCAINE HYDROCHLORIDE 10 MG/ML
0.1 INJECTION, SOLUTION EPIDURAL; INFILTRATION; INTRACAUDAL; PERINEURAL ONCE
Status: DISCONTINUED | OUTPATIENT
Start: 2024-01-17 | End: 2024-01-17 | Stop reason: HOSPADM

## 2024-01-17 RX ORDER — DEXAMETHASONE SODIUM PHOSPHATE 4 MG/ML
INJECTION, SOLUTION INTRA-ARTICULAR; INTRALESIONAL; INTRAMUSCULAR; INTRAVENOUS; SOFT TISSUE AS NEEDED
Status: DISCONTINUED | OUTPATIENT
Start: 2024-01-17 | End: 2024-01-17

## 2024-01-17 RX ORDER — BUPIVACAINE HCL/EPINEPHRINE 0.5-1:200K
VIAL (ML) INJECTION
Status: DISPENSED
Start: 2024-01-17 | End: 2024-01-17

## 2024-01-17 RX ORDER — MEPERIDINE HYDROCHLORIDE 25 MG/ML
12.5 INJECTION INTRAMUSCULAR; INTRAVENOUS; SUBCUTANEOUS EVERY 10 MIN PRN
Status: DISCONTINUED | OUTPATIENT
Start: 2024-01-17 | End: 2024-01-17 | Stop reason: HOSPADM

## 2024-01-17 RX ORDER — CEFAZOLIN 1 G/1
INJECTION, POWDER, FOR SOLUTION INTRAVENOUS AS NEEDED
Status: DISCONTINUED | OUTPATIENT
Start: 2024-01-17 | End: 2024-01-17

## 2024-01-17 RX ORDER — ONDANSETRON HYDROCHLORIDE 2 MG/ML
4 INJECTION, SOLUTION INTRAVENOUS ONCE AS NEEDED
Status: DISCONTINUED | OUTPATIENT
Start: 2024-01-17 | End: 2024-01-17 | Stop reason: HOSPADM

## 2024-01-17 RX ORDER — MIDAZOLAM HYDROCHLORIDE 1 MG/ML
INJECTION, SOLUTION INTRAMUSCULAR; INTRAVENOUS AS NEEDED
Status: DISCONTINUED | OUTPATIENT
Start: 2024-01-17 | End: 2024-01-17

## 2024-01-17 RX ORDER — OXYCODONE HYDROCHLORIDE 5 MG/1
5 TABLET ORAL EVERY 4 HOURS PRN
Status: DISCONTINUED | OUTPATIENT
Start: 2024-01-17 | End: 2024-01-17 | Stop reason: HOSPADM

## 2024-01-17 RX ORDER — INDOMETHACIN 25 MG/1
CAPSULE ORAL
Status: DISPENSED
Start: 2024-01-17 | End: 2024-01-17

## 2024-01-17 RX ORDER — SODIUM CHLORIDE, SODIUM LACTATE, POTASSIUM CHLORIDE, CALCIUM CHLORIDE 600; 310; 30; 20 MG/100ML; MG/100ML; MG/100ML; MG/100ML
100 INJECTION, SOLUTION INTRAVENOUS CONTINUOUS
Status: DISCONTINUED | OUTPATIENT
Start: 2024-01-17 | End: 2024-01-17 | Stop reason: HOSPADM

## 2024-01-17 RX ORDER — MIDAZOLAM HYDROCHLORIDE 1 MG/ML
INJECTION INTRAMUSCULAR; INTRAVENOUS
Status: DISPENSED
Start: 2024-01-17 | End: 2024-01-17

## 2024-01-17 RX ORDER — LIDOCAINE HYDROCHLORIDE 20 MG/ML
INJECTION, SOLUTION EPIDURAL; INFILTRATION; INTRACAUDAL; PERINEURAL AS NEEDED
Status: DISCONTINUED | OUTPATIENT
Start: 2024-01-17 | End: 2024-01-17

## 2024-01-17 RX ORDER — SODIUM CHLORIDE, SODIUM LACTATE, POTASSIUM CHLORIDE, CALCIUM CHLORIDE 600; 310; 30; 20 MG/100ML; MG/100ML; MG/100ML; MG/100ML
INJECTION, SOLUTION INTRAVENOUS CONTINUOUS PRN
Status: DISCONTINUED | OUTPATIENT
Start: 2024-01-17 | End: 2024-01-17

## 2024-01-17 RX ORDER — FENTANYL CITRATE 50 UG/ML
INJECTION, SOLUTION INTRAMUSCULAR; INTRAVENOUS AS NEEDED
Status: DISCONTINUED | OUTPATIENT
Start: 2024-01-17 | End: 2024-01-17

## 2024-01-17 RX ORDER — GLYCOPYRROLATE 0.2 MG/ML
INJECTION INTRAMUSCULAR; INTRAVENOUS AS NEEDED
Status: DISCONTINUED | OUTPATIENT
Start: 2024-01-17 | End: 2024-01-17

## 2024-01-17 RX ORDER — LIDOCAINE HYDROCHLORIDE 20 MG/ML
INJECTION, SOLUTION EPIDURAL; INFILTRATION; INTRACAUDAL; PERINEURAL
Status: DISPENSED
Start: 2024-01-17 | End: 2024-01-17

## 2024-01-17 RX ORDER — DEXAMETHASONE SODIUM PHOSPHATE 4 MG/ML
INJECTION, SOLUTION INTRA-ARTICULAR; INTRALESIONAL; INTRAMUSCULAR; INTRAVENOUS; SOFT TISSUE
Status: DISPENSED
Start: 2024-01-17 | End: 2024-01-17

## 2024-01-17 RX ADMIN — DEXAMETHASONE SODIUM PHOSPHATE 4 MG: 4 INJECTION, SOLUTION INTRAMUSCULAR; INTRAVENOUS at 07:55

## 2024-01-17 RX ADMIN — PROPOFOL 150 MG: 10 INJECTION, EMULSION INTRAVENOUS at 07:55

## 2024-01-17 RX ADMIN — PROPOFOL 50 MG: 10 INJECTION, EMULSION INTRAVENOUS at 09:01

## 2024-01-17 RX ADMIN — TOPIRAMATE 100 MG: 25 TABLET, FILM COATED ORAL at 11:47

## 2024-01-17 RX ADMIN — GABAPENTIN 800 MG: 400 CAPSULE ORAL at 15:54

## 2024-01-17 RX ADMIN — HYDROMORPHONE HYDROCHLORIDE 0.5 MG: 1 INJECTION, SOLUTION INTRAMUSCULAR; INTRAVENOUS; SUBCUTANEOUS at 17:13

## 2024-01-17 RX ADMIN — HYDROMORPHONE HYDROCHLORIDE 0.5 MG: 1 INJECTION, SOLUTION INTRAMUSCULAR; INTRAVENOUS; SUBCUTANEOUS at 09:44

## 2024-01-17 RX ADMIN — GABAPENTIN 800 MG: 400 CAPSULE ORAL at 20:56

## 2024-01-17 RX ADMIN — OXYCODONE HYDROCHLORIDE 5 MG: 5 TABLET ORAL at 20:56

## 2024-01-17 RX ADMIN — ONDANSETRON 4 MG: 2 INJECTION INTRAMUSCULAR; INTRAVENOUS at 07:55

## 2024-01-17 RX ADMIN — PANTOPRAZOLE SODIUM 40 MG: 40 TABLET, DELAYED RELEASE ORAL at 11:47

## 2024-01-17 RX ADMIN — HYDROMORPHONE HYDROCHLORIDE 0.5 MG: 1 INJECTION, SOLUTION INTRAMUSCULAR; INTRAVENOUS; SUBCUTANEOUS at 22:42

## 2024-01-17 RX ADMIN — TOPIRAMATE 200 MG: 25 TABLET, FILM COATED ORAL at 20:55

## 2024-01-17 RX ADMIN — GLYCOPYRROLATE 0.2 MG: 0.2 INJECTION INTRAMUSCULAR; INTRAVENOUS at 07:50

## 2024-01-17 RX ADMIN — MIDAZOLAM HYDROCHLORIDE 2 MG: 1 INJECTION, SOLUTION INTRAMUSCULAR; INTRAVENOUS at 07:40

## 2024-01-17 RX ADMIN — Medication 2 L/MIN: at 09:21

## 2024-01-17 RX ADMIN — GABAPENTIN 800 MG: 400 CAPSULE ORAL at 11:47

## 2024-01-17 RX ADMIN — LIDOCAINE HYDROCHLORIDE 50 MG: 20 INJECTION, SOLUTION EPIDURAL; INFILTRATION; INTRACAUDAL; PERINEURAL at 07:55

## 2024-01-17 RX ADMIN — HYDROMORPHONE HYDROCHLORIDE 0.5 MG: 1 INJECTION, SOLUTION INTRAMUSCULAR; INTRAVENOUS; SUBCUTANEOUS at 11:48

## 2024-01-17 RX ADMIN — CEFAZOLIN 2 G: 330 INJECTION, POWDER, FOR SOLUTION INTRAMUSCULAR; INTRAVENOUS at 07:50

## 2024-01-17 RX ADMIN — FENTANYL CITRATE 50 MCG: 50 INJECTION, SOLUTION INTRAMUSCULAR; INTRAVENOUS at 07:50

## 2024-01-17 RX ADMIN — SODIUM CHLORIDE, POTASSIUM CHLORIDE, SODIUM LACTATE AND CALCIUM CHLORIDE: 600; 310; 30; 20 INJECTION, SOLUTION INTRAVENOUS at 07:40

## 2024-01-17 ASSESSMENT — PAIN - FUNCTIONAL ASSESSMENT
PAIN_FUNCTIONAL_ASSESSMENT: 0-10
PAIN_FUNCTIONAL_ASSESSMENT: 0-10
PAIN_FUNCTIONAL_ASSESSMENT: VAS (VISUAL ANALOG SCALE)
PAIN_FUNCTIONAL_ASSESSMENT: 0-10

## 2024-01-17 ASSESSMENT — PAIN SCALES - GENERAL
PAINLEVEL_OUTOF10: 3
PAINLEVEL_OUTOF10: 7
PAINLEVEL_OUTOF10: 0 - NO PAIN
PAINLEVEL_OUTOF10: 7
PAINLEVEL_OUTOF10: 8
PAIN_LEVEL: 0
PAINLEVEL_OUTOF10: 4
PAINLEVEL_OUTOF10: 0 - NO PAIN
PAINLEVEL_OUTOF10: 8
PAINLEVEL_OUTOF10: 0 - NO PAIN
PAINLEVEL_OUTOF10: 10 - WORST POSSIBLE PAIN
PAINLEVEL_OUTOF10: 0 - NO PAIN
PAINLEVEL_OUTOF10: 0 - NO PAIN
PAINLEVEL_OUTOF10: 7

## 2024-01-17 ASSESSMENT — COGNITIVE AND FUNCTIONAL STATUS - GENERAL
MOBILITY SCORE: 11
STANDING UP FROM CHAIR USING ARMS: A LOT
WALKING IN HOSPITAL ROOM: TOTAL
TOILETING: A LOT
DRESSING REGULAR UPPER BODY CLOTHING: A LITTLE
HELP NEEDED FOR BATHING: A LOT
DAILY ACTIVITIY SCORE: 16
MOVING TO AND FROM BED TO CHAIR: A LOT
TURNING FROM BACK TO SIDE WHILE IN FLAT BAD: A LOT
CLIMB 3 TO 5 STEPS WITH RAILING: TOTAL
DRESSING REGULAR LOWER BODY CLOTHING: A LOT
PERSONAL GROOMING: A LITTLE
MOVING FROM LYING ON BACK TO SITTING ON SIDE OF FLAT BED WITH BEDRAILS: A LITTLE

## 2024-01-17 ASSESSMENT — PAIN DESCRIPTION - ORIENTATION
ORIENTATION: RIGHT
ORIENTATION: RIGHT

## 2024-01-17 ASSESSMENT — PAIN DESCRIPTION - LOCATION
LOCATION: FOOT
LOCATION: FOOT

## 2024-01-17 NOTE — PROGRESS NOTES
Roby Qureshi is a 78 y.o. female on day 3 of admission presenting with Back pain, unspecified back location, unspecified back pain laterality, unspecified chronicity.      Subjective   Patient was seen and examined at bedside this morning    Objective     Last Recorded Vitals  /69   Pulse 97   Temp 36.4 °C (97.5 °F)   Resp 18   Wt 61.2 kg (134 lb 14.7 oz)   SpO2 90%   Intake/Output last 3 Shifts:    Intake/Output Summary (Last 24 hours) at 1/17/2024 1131  Last data filed at 1/17/2024 1130  Gross per 24 hour   Intake 1505 ml   Output 3802 ml   Net -2297 ml         Admission Weight  Weight: 61.2 kg (135 lb) (01/14/24 1026)    Daily Weight  01/17/24 : 61.2 kg (134 lb 14.7 oz)    Image Results  FL less than 1 hour  These images are not reportable by radiology and will not be interpreted   by  Radiologists.      Physical Exam  Constitutional: Elderly female, alert active, cooperative not in acute distress  Eyes: PERRLA, clear sclera  ENMT: Moist mucosal membranes, no exudate  Head / Neck: Atraumatic, normocephalic, supple neck, JVP not visualized  Lungs: Patent airways, CTABL  Heart: RRR, S1S2, no murmurs appreciated, palpable pulses in all extremities  GI: Soft, NT, ND, bowel sounds present in all quadrants  MSK: Moves all extremities freely with tenderness and limited ROM with rotation, sidebending flexion extension of the lumbar spine, no joint edema  Extremities: Intact x 4, no peripheral edema  : No Crouch catheter inserted  Breast: Deferred  Neurological: AAO x 3 to person, place and date, facial muscles symmetrical, sensation intact, strength 4/4, no acute focal neurological deficits appreciated  Psychological: Appropriate mood and behavior    Relevant Results           Scheduled medications  BUPivacaine-EPINEPHrine, , ,   dexAMETHasone, , ,   enoxaparin, 40 mg, subcutaneous, q24h  gabapentin, 800 mg, oral, TID  ketamine, 0.5 mg/kg, intravenous, Once  lidocaine PF, , ,   metoprolol succinate  XL, 25 mg, oral, Daily  midazolam, , ,   pantoprazole, 40 mg, oral, Daily before breakfast   Or  pantoprazole, 40 mg, intravenous, Daily before breakfast  sodium bicarbonate, , ,   topiramate, 100 mg, oral, q AM  topiramate, 200 mg, oral, Nightly      Continuous medications  oxygen,       PRN medications  PRN medications: acetaminophen **OR** acetaminophen **OR** acetaminophen, BUPivacaine-EPINEPHrine, dexAMETHasone, doxepin, HYDROmorphone, lidocaine PF, midazolam, ondansetron ODT **OR** ondansetron, oxyCODONE, sodium bicarbonate      Assessment/Plan        78 y.o. female presenting with intractable right lower back and leg pain, acute exacerbation of chronic back and leg pain, acute fracture of the distal fibula, acute ankle dislocation, unable to ambulate, unable to care for self, right leg weakness..  Patient with a history of chronic back pain and lumbar radiculopathy on the right with multiple injections, status postlaminectomy.  Patient is followed by pain management.  She has had multiple ED visits and hospitalizations for the leg and back pain     Principal Problem:    Back pain, unspecified back location, unspecified back pain laterality, unspecified chronicity  Active Problems:    Acute back pain, unspecified back location, unspecified back pain laterality    Acute right lumbar radiculopathy    Closed fracture of right ankle    Ankle dislocation, right, initial encounter    Unable to ambulate    Unable to care for self    Right leg weakness    History of migraine headaches       Right bimalleolar acute bilateral ankle fracture  -Status post mechanical fall, reportedly multiple times at her place  -Limited METS due to chronic pain, but has no major cardiopulmonary comorbidities, most recent ECG reviewed, has history of arrhythmia but currently stable, chest x-ray (December 28, 2023) reviewed  -Patient with moderate risk, but medically optimized to proceed with surgery  -s/p ORIF of right ankle  today    Chronic back pain: Presenting with excruciating sciatica pain  -History of multiple surgeries and epidural injections  -States following the surgeon for spinal nerve stimulation  -Dilaudid 0.4 mg IV push every 4 hours as needed severe pain  -Percocet 5-325 mg 1 tablet every 6 hours as needed breakthrough pain  -Cyclobenzaprine 10 mg 3 times daily as needed muscle spasm  -Continue duloxetine 60 mg daily  -Gabapentin 800 mg daily  -Pain management consulted  -MRI T and L spine ordered, pending     History of arrhythmia  -On metoprolol 25 mg daily     Depression  -Venlafaxine  mg daily  -Topiramate 100 mg daily, also off label use for pain management     Insomnia  -Zolpidem 5 mg at bedtime (caution with waleska return to use with opiate)     Diet  -Regular     DVT prophylaxis  -Lovenox 40 mg subcu daily     CODE STATUS: Full     Disposition: Presenting with chronic low back pain, need further evaluation and management, discharge pending pain management evaluation and recommendation.      Destiny Schilling MD

## 2024-01-17 NOTE — OP NOTE
Right Ankle Open Reduction Internal Fixation (R) Operative Note     Date: 2024 - 2024  OR Location: [facility] A OR    Name: Roby Qureshi, : 1945, Age: 78 y.o., MRN: [id number], Sex: female    Diagnosis  Pre-op Diagnosis     * Closed fracture of right ankle, initial encounter [S82.891A] Post-op Diagnosis     * Closed fracture of right ankle, initial encounter [S82.891A]     Procedures  Right Ankle Open Reduction Internal Fixation  12989 - [place] OPEN TREATMENT BIMALLEOLAR ANKLE FRACTURE      Surgeons      * Gato Escudero - Primary    Resident/Fellow/Other Assistant:  Surgeon(s) and Role:     * Joey John MD - Resident - Assisting    Procedure Summary  Anesthesia: General  ASA: III  Anesthesia Staff: Anesthesiologist: Mateo Brown MD  CRNA: ALLAN Lu-AVERY, DNP  Estimated Blood Loss: 5mL  Intra-op Medications: * No intraprocedure medications in log *           Anesthesia Record               Intraprocedure I/O Totals       None           Specimen: No specimens collected     Staff:   Circulator: Julieta Calabrese RN  Scrub Person: Kenia Agudelo; Dinah Naranjo         Drains and/or Catheters:   External Urinary Catheter Female (Active)   Output (mL) 300 mL 24 0449       Tourniquet Times:   * Missing tourniquet times found for documented tourniquets in lo *     Implants:  Implants       Type Name Action Serial No.      Screw SCREW, LOW PROFILE, LOCKING, 3.5 X 22MM, SS - SNA - [id number] Implanted NA     Screw SCREW, LOW PROFILE, CORTICAL, 3.5 X 14MM, SS - SNA - [id number] Wasted NA     Screw SCREW, NON LOCKING, LOW PROFILE, 2.7 X 18MM, CORTICAL, SS - SNA - [id number] Implanted NA     Screw SCREW, LOW PROFILE, LOCKING, 2.7MM X 16MM, SS - SNA - [id number] Implanted NA     Screw KRUELOCK SCREW Implanted NA     Screw SCREW, LOW PROFILE, CORTICAL, 3.5 X 12MM. SS - SNA - [id number] Implanted NA     Screw 3.5 KRUELOCK SCREW Implanted NA     Screw KRUELOCK  SCREW Implanted NA      Plate LOCKING RIGHT FIBULA PLATE Implanted NA              Findings: bimalleolar equivalent ankle fracture    Indications: Roby Qureshi is an 78 y.o. female who is having surgery for Closed fracture of right ankle, initial encounter [S82.980F].     The patient was seen in the preoperative area. The risks, benefits, complications, treatment options, non-operative alternatives, expected recovery and outcomes were discussed with the patient. The possibilities of reaction to medication, pulmonary aspiration, injury to surrounding structures, bleeding, recurrent infection, the need for additional procedures, failure to diagnose a condition, and creating a complication requiring transfusion or operation were discussed with the patient. The patient concurred with the proposed plan, giving informed consent.  The site of surgery was properly noted/marked if necessary per policy. The patient has been actively warmed in preoperative area. Preoperative antibiotics have been ordered and given within 1 hours of incision. Venous thrombosis prophylaxis have been ordered including unilateral sequential compression device    Procedure Details: The patient was met in the preoperative holding area.  The right ankle  was identified as the correct operative limb by myself, the patient, as well as attending anesthesiologist, and marked with an indelible marker.  Informed consent was in chart.  The received a regional nerve block by Anesthesia, was taken back to the OR, placed supine on the operative bed in stable condition.  At this point, a formal interdisciplinary Huddle was carried out correctly identifying the patient, procedure, correct operative limb, and all necessary equipment.  All were in agreement.  They were then surrendered under general anaesthetic.  Airway was secured with an LMA tube.   A nonsterile thigh tourniquet was then placed and the operative leg was then prepped and draped in standard sterile fashion.   They received Ancef for antibiotics.  Time-out was called.  Limb was exsanguinated, tourniquet inflated.  Then made an incision along the posterior lateral border of the fibula.  This was taken sharply down through subcutaneous tissue.  The fascia was opened the superficial peroneal nerve was identified and protected.  The fracture was encountered periosteum was debrided from the edge of the fracture as well as fracture debris.  A reduction was made and then held in place with a reduction clamp.  A 3.5 interfragmentary screw was placed using standard AO technique.  Bone quality was poor therefore a locking plate was selected.  A 5 hole Arthrex locking plate position on the lateral surface of the fibula and held in place with 2 BB tacks.  A cortical screw was placed proximally compressing the plate to the bone.  5 distal locking screws were placed in the distal fibula aided by fluoroscopy.  3 additional 3 5 locking screws were placed in the proximal aspect of the plate.  The BB tacks were removed.  Fluoroscopy confirmed anatomic reduction of the fracture.  She did have a small avulsion fracture off the medial malleolus however with stress testing the ankle was now stable.  The wound was copiously irrigated fascia closed over the plate with 0 Vicryl 2-0 Vicryl for the subcuticular layer and nylon for the skin.  Sterile dressing was placed followed by a short leg splint. Tournaquet was deflated. At the end of the case, all sponge, needle, and instrument counts were correct.  Patient was then extubated and taken to PACU without complication.       POSTOPERATIVE PLAN:   The patient will be readmittd to the  medical service.  Patient is NWB. I will see themin the office in a week for a wound check.    Complications:  None; patient tolerated the procedure well.    Disposition: PACU - hemodynamically stable.  Condition: stable         Attending Attestation: I was present and scrubbed for the entire procedure.    Gato STEPHENS  [place]  Phone Number: [phone]

## 2024-01-17 NOTE — BRIEF OP NOTE
Date: 2024  OR Location: [facility] OR    Name: Roby Qureshi, : 1945, Age: 78 y.o., MRN: [id number], Sex: female    Diagnosis  Pre-op Diagnosis     * Closed fracture of right ankle, initial encounter [S82.761I] Post-op Diagnosis     * Closed fracture of right ankle, initial encounter [S82.891A]     Procedures  Right Ankle Open Reduction Internal Fixation  22554 - [place] OPEN TREATMENT BIMALLEOLAR ANKLE FRACTURE      Surgeons      * Gato Escudero - Primary    Resident/Fellow/Other Assistant:  Surgeon(s) and Role:     * Joey John MD - Resident - Assisting    Procedure Summary  Anesthesia: General  ASA: III  Anesthesia Staff: Anesthesiologist: Mateo Brown MD  CRNA: ALLAN uL-AVERY, DNP  Estimated Blood Loss: 5mL  Intra-op Medications: * No intraprocedure medications in log *           Anesthesia Record               Intraprocedure I/O Totals          Intake    LR infusion 600.00 mL    Total Intake 600 mL          Specimen: No specimens collected     Staff:   Circulator: Julieta Calabrese RN  Scrub Person: Kenia Naranjo      Post-op plan:  - NWB RLE in splint  - PT/OT  - ancef x 24hr  - DVT ppx per primary; recommend minimum asa 81 BID x 6 weeks  - multimodal pain control  - follow up with Dr Escudero in clinic in 2 weeks for first post-op visit  - remainder of care per primary    Findings: right lateral malleolar ankle fracture    Complications:  None; patient tolerated the procedure well.     Disposition: PACU - hemodynamically stable.  Condition: stable  Specimens Collected: No specimens collected  Attending Attestation:     Gato Escudero  Phone Number: [phone]

## 2024-01-17 NOTE — ANESTHESIA PROCEDURE NOTES
Airway  Date/Time: 1/17/2024 7:56 AM  Urgency: elective    Airway not difficult    Staffing  Performed: CRNA   Authorized by: Mateo Brown MD    Performed by: ALLAN Lu-AVERY, DNP  Patient location during procedure: OR    Indications and Patient Condition  Indications for airway management: anesthesia  Spontaneous ventilation: present  Sedation level: deep  Preoxygenated: yes  Patient position: sniffing  MILS maintained throughout  Mask difficulty assessment: 0 - not attempted  No planned trial extubation    Final Airway Details  Final airway type: supraglottic airway      Successful airway: Supraglottic airway: I-Gel.  Size 4     Number of attempts at approach: 1  Ventilation between attempts: none  Number of other approaches attempted: 0

## 2024-01-17 NOTE — ANESTHESIA PREPROCEDURE EVALUATION
Patient: Roby Qureshi    Procedure Information       Date/Time: 01/17/24 5330    Procedure: Right Ankle Open Reduction Internal Fixation (Right: Ankle)    Location: [facility] A OR 06 / Virtual [facility] A OR    Surgeons: Gato Escudero MD            Relevant Problems   Anesthesia (within normal limits)      Cardiovascular   (+) Paroxysmal SVT (supraventricular tachycardia)      GI   (+) Chronic diarrhea   (+) Gastroesophageal reflux disease without esophagitis      Neuro/Psych  Radiculopathy LEs   (+) Acute right lumbar radiculopathy   (+) Bipolar depression (CMS/HCC)   (+) Bipolar disorder, most recent episode hypomanic (CMS/HCC)   (+) History of migraine headaches   (+) Neuropathy, leg   (+) Peripheral nerve disease   (+) Radiculopathy with lower extremity symptoms   (+) Radiculopathy, sacral and sacrococcygeal region   (+) Right lumbar radiculitis   (+) Sciatica of right side      Hematology   (+) Anemia      Musculoskeletal   (+) Chronic right-sided low back pain with right-sided sciatica (Chronic analgesic use)   (+) Other spondylosis with myelopathy, cervical region   (+) Spinal stenosis of cervical region   (+) Spinal stenosis of lumbar region with radiculopathy       Clinical information reviewed:   Tobacco  Allergies  Meds   Med Hx  Surg Hx   Fam Hx  Soc Hx        NPO Detail:  No data recorded     Physical Exam    Airway  Mallampati: II     Cardiovascular    Dental   Comments: Very poor condition   Pulmonary    Abdominal            Anesthesia Plan    History of general anesthesia?: yes  History of complications of general anesthesia?: no    ASA 3     general     intravenous induction   Anesthetic plan and risks discussed with patient.

## 2024-01-17 NOTE — ANESTHESIA PROCEDURE NOTES
Peripheral Block    Patient location during procedure: pre-op  Start time: 1/17/2024 7:15 AM  End time: 1/17/2024 7:30 AM  Reason for block: procedure for pain, at surgeon's request and post-op pain management  Staffing  Performed: attending   Authorized by: Mateo Brown MD    Performed by: Mateo Brown MD  Preanesthetic Checklist  Completed: patient identified, IV checked, site marked, risks and benefits discussed, surgical consent, monitors and equipment checked, pre-op evaluation and timeout performed   Timeout performed at: 1/17/2024 7:15 AM  Peripheral Block  Patient position: laying flat  Prep: ChloraPrep  Patient monitoring: heart rate and continuous pulse ox  Block type: adductor canal and popliteal  Laterality: right  Injection technique: single-shot  Guidance: ultrasound guided  Local infiltration: lidocaine  Needle  Needle type: short-bevel   Needle gauge: 22 G  Needle length: 8 cm  Needle localization: ultrasound guidance     image stored in chart  Test dose: negative  Assessment  Injection assessment: negative aspiration for heme, no paresthesia on injection, incremental injection and local visualized surrounding nerve on ultrasound  Paresthesia pain: none  Heart rate change: no  Slow fractionated injection: yes  Additional Notes  Bupivacaine 0.375% 30ml + lidocaine 2% 10ml + decadron 4mg + NaHCO3 1ml + NS 15ml =60ml total used for block.  25ml adductor supine, 35ml popliteal prone.  Versed 2mg iv given.  Pt janina proc well.

## 2024-01-17 NOTE — ANESTHESIA POSTPROCEDURE EVALUATION
Patient: Roby Qureshi    Procedure Summary       Date: 01/17/24 Room / Location: [facility] / [facility]    Anesthesia Start: 0740 Anesthesia Stop: 0929    Procedure: Right Ankle Open Reduction Internal Fixation (Right: Ankle) Diagnosis:       Closed fracture of right ankle, initial encounter      (Closed fracture of right ankle, initial encounter [S82.891A])    Surgeons: Gato Escudero MD Responsible Provider: Mateo Brown MD    Anesthesia Type: general ASA Status: 3            Anesthesia Type: general    Vitals Value Taken Time   /67 01/17/24 0931   Temp 36.9 °C (98.4 °F) 01/17/24 0922   Pulse 103 01/17/24 0936   Resp 12 01/17/24 0930   SpO2 98 % 01/17/24 0936   Vitals shown include unvalidated device data.    Anesthesia Post Evaluation    Patient location during evaluation: bedside  Patient participation: complete - patient cannot participate  Level of consciousness: awake  Pain score: 0  Pain management: adequate  Airway patency: patent  Cardiovascular status: acceptable  Respiratory status: acceptable  Hydration status: acceptable  Postoperative Nausea and Vomiting: none        There were no known notable events for this encounter.

## 2024-01-17 NOTE — CARE PLAN
Problem: Pain  Goal: Takes deep breaths with improved pain control throughout the shift  Outcome: Not Progressing     Problem: Pain - Adult  Goal: Verbalizes/displays adequate comfort level or baseline comfort level  Outcome: Progressing     Problem: Chronic Conditions and Co-morbidities  Goal: Patient's chronic conditions and co-morbidity symptoms are monitored and maintained or improved  Outcome: Progressing     Problem: Skin  Goal: Participates in plan/prevention/treatment measures  Outcome: Progressing       The clinical goals for the shift include   Over the shift, the patient did not make progress toward the following goals. Barriers to progression include pt unwilling to try deep breathing for pain management.     Problem: Pain  Goal: Takes deep breaths with improved pain control throughout the shift  Outcome: Not Progressing

## 2024-01-17 NOTE — PROGRESS NOTES
Roby Qureshi is a 78 y.o. female on day 3 of admission presenting with Back pain, unspecified back location, unspecified back pain laterality, unspecified chronicity.    Subjective   78F with right hanny B ankle fracture, agrees with plan for ORIF R ankle today. NPO at MN. Denies n/t, cp, sob.       Objective     - Constitutional: no acute distress, alert, cooperative  - Eyes: anicteric  - Head/Neck: normocephalic, atraumatic  - Respiratory/Thorax: normal work of breathing  - Cardiovascular: extremities warm and well perfused  - Gastrointestinal: non-distended  - Psychological: appropriate mood/behavior  - Skin: warm and dry; additional findings in musculoskeletal evaluation  - Musculoskeletal:    RLE:  - splint inplace cdi  - silt mtz/sa/tib/dp  - wiggles toes in splint  - palpable dp pulse  - compartments soft    Last Recorded Vitals  Blood pressure 122/74, pulse 81, temperature 37.1 °C (98.7 °F), resp. rate 18, height 1.524 m (5'), weight 61.2 kg (135 lb), SpO2 98 %.  Intake/Output last 3 Shifts:  I/O last 3 completed shifts:  In: 2510 (41 mL/kg) [P.O.:2010; IV Piggyback:500]  Out: 1700 (27.8 mL/kg) [Urine:1700 (0.8 mL/kg/hr)]  Weight: 61.2 kg     Relevant Results      Scheduled medications  enoxaparin, 40 mg, subcutaneous, q24h  gabapentin, 800 mg, oral, TID  propofol, 0.5 mg/kg, intravenous, Once   And  ketamine, 0.5 mg/kg, intravenous, Once  metoprolol succinate XL, 25 mg, oral, Daily  pantoprazole, 40 mg, oral, Daily before breakfast   Or  pantoprazole, 40 mg, intravenous, Daily before breakfast  topiramate, 100 mg, oral, q AM  topiramate, 200 mg, oral, Nightly      Continuous medications  oxygen,       PRN medications  PRN medications: acetaminophen **OR** acetaminophen **OR** acetaminophen, doxepin, HYDROmorphone, ondansetron ODT **OR** ondansetron, oxyCODONE  Results for orders placed or performed during the hospital encounter of 01/14/24 (from the past 24 hour(s))   CBC   Result Value Ref Range     WBC 7.8 4.4 - 11.3 x10*3/uL    nRBC 0.0 0.0 - 0.0 /100 WBCs    RBC 3.47 (L) 4.00 - 5.20 x10*6/uL    Hemoglobin 10.5 (L) 12.0 - 16.0 g/dL    Hematocrit 33.0 (L) 36.0 - 46.0 %    MCV 95 80 - 100 fL    MCH 30.3 26.0 - 34.0 pg    MCHC 31.8 (L) 32.0 - 36.0 g/dL    RDW 15.7 (H) 11.5 - 14.5 %    Platelets 232 150 - 450 x10*3/uL   Basic metabolic panel   Result Value Ref Range    Glucose 114 (H) 74 - 99 mg/dL    Sodium 141 136 - 145 mmol/L    Potassium 3.6 3.5 - 5.3 mmol/L    Chloride 111 (H) 98 - 107 mmol/L    Bicarbonate 22 21 - 32 mmol/L    Anion Gap 12 10 - 20 mmol/L    Urea Nitrogen 10 6 - 23 mg/dL    Creatinine 0.60 0.50 - 1.05 mg/dL    eGFR >90 >60 mL/min/1.73m*2    Calcium 8.1 (L) 8.6 - 10.3 mg/dL   CBC   Result Value Ref Range    WBC 8.2 4.4 - 11.3 x10*3/uL    nRBC 0.0 0.0 - 0.0 /100 WBCs    RBC 3.57 (L) 4.00 - 5.20 x10*6/uL    Hemoglobin 10.5 (L) 12.0 - 16.0 g/dL    Hematocrit 36.6 36.0 - 46.0 %     (H) 80 - 100 fL    MCH 29.4 26.0 - 34.0 pg    MCHC 28.7 (L) 32.0 - 36.0 g/dL    RDW 15.8 (H) 11.5 - 14.5 %    Platelets 237 150 - 450 x10*3/uL   Basic metabolic panel   Result Value Ref Range    Glucose 99 74 - 99 mg/dL    Sodium 139 136 - 145 mmol/L    Potassium 3.8 3.5 - 5.3 mmol/L    Chloride 111 (H) 98 - 107 mmol/L    Bicarbonate 19 (L) 21 - 32 mmol/L    Anion Gap 13 10 - 20 mmol/L    Urea Nitrogen 9 6 - 23 mg/dL    Creatinine 0.53 0.50 - 1.05 mg/dL    eGFR >90 >60 mL/min/1.73m*2    Calcium 8.5 (L) 8.6 - 10.3 mg/dL                            Assessment/Plan   Principal Problem:    Back pain, unspecified back location, unspecified back pain laterality, unspecified chronicity  Active Problems:    Acute back pain, unspecified back location, unspecified back pain laterality    Acute right lumbar radiculopathy    Closed fracture of right ankle    Ankle dislocation, right, initial encounter    Unable to ambulate    Unable to care for self    Right leg weakness    78F with R hanny B ankle fx  - plan for OR  today  - NPO after MN  - medically optimized per primary  - NWB RLE  - remainder per primary           Joey John MD

## 2024-01-17 NOTE — PROGRESS NOTES
01/17/2024 0759 Patient DANIELA to OR for planned Right ankle ORIF. Response from [facility], patient out of network, may have been working with [facility].  Referral & inquiry sent to [facility].   Vashti BALES RN [facility]    01/17/2024 1537 s/p Right ankle ORIF. Tcc met with patient at bedside. Patient informs she need sot go to SNF. FOC is [facility]. Tcc explained [facility] is out of network. [facility] is out of net work.  Patient give permission to send mass SNF referrals. Sent per TCC via [facility]. ( [facility] [employer]).  Vashti BALES RN [facility]

## 2024-01-18 ENCOUNTER — APPOINTMENT (OUTPATIENT)
Dept: RADIOLOGY | Facility: HOSPITAL | Age: 79
DRG: 494 | End: 2024-01-18
Payer: COMMERCIAL

## 2024-01-18 ENCOUNTER — APPOINTMENT (OUTPATIENT)
Dept: CARDIOLOGY | Facility: HOSPITAL | Age: 79
DRG: 494 | End: 2024-01-18
Payer: COMMERCIAL

## 2024-01-18 LAB
ANION GAP SERPL CALC-SCNC: 15 MMOL/L (ref 10–20)
BUN SERPL-MCNC: 9 MG/DL (ref 6–23)
CALCIUM SERPL-MCNC: 8.5 MG/DL (ref 8.6–10.3)
CHLORIDE SERPL-SCNC: 111 MMOL/L (ref 98–107)
CO2 SERPL-SCNC: 21 MMOL/L (ref 21–32)
CREAT SERPL-MCNC: 0.49 MG/DL (ref 0.5–1.05)
EGFRCR SERPLBLD CKD-EPI 2021: >90 ML/MIN/1.73M*2
ERYTHROCYTE [DISTWIDTH] IN BLOOD BY AUTOMATED COUNT: 15.4 % (ref 11.5–14.5)
GLUCOSE SERPL-MCNC: 101 MG/DL (ref 74–99)
HCT VFR BLD AUTO: 32.2 % (ref 36–46)
HGB BLD-MCNC: 10.6 G/DL (ref 12–16)
MCH RBC QN AUTO: 29.4 PG (ref 26–34)
MCHC RBC AUTO-ENTMCNC: 32.9 G/DL (ref 32–36)
MCV RBC AUTO: 89 FL (ref 80–100)
NRBC BLD-RTO: 0 /100 WBCS (ref 0–0)
PLATELET # BLD AUTO: 301 X10*3/UL (ref 150–450)
POTASSIUM SERPL-SCNC: 4.1 MMOL/L (ref 3.5–5.3)
RBC # BLD AUTO: 3.61 X10*6/UL (ref 4–5.2)
SODIUM SERPL-SCNC: 143 MMOL/L (ref 136–145)
WBC # BLD AUTO: 14.2 X10*3/UL (ref 4.4–11.3)

## 2024-01-18 PROCEDURE — 99232 SBSQ HOSP IP/OBS MODERATE 35: CPT | Performed by: INTERNAL MEDICINE

## 2024-01-18 PROCEDURE — 2500000001 HC RX 250 WO HCPCS SELF ADMINISTERED DRUGS (ALT 637 FOR MEDICARE OP): Performed by: INTERNAL MEDICINE

## 2024-01-18 PROCEDURE — 93005 ELECTROCARDIOGRAM TRACING: CPT

## 2024-01-18 PROCEDURE — 2500000001 HC RX 250 WO HCPCS SELF ADMINISTERED DRUGS (ALT 637 FOR MEDICARE OP): Performed by: PHYSICIAN ASSISTANT

## 2024-01-18 PROCEDURE — 36415 COLL VENOUS BLD VENIPUNCTURE: CPT | Performed by: INTERNAL MEDICINE

## 2024-01-18 PROCEDURE — 85027 COMPLETE CBC AUTOMATED: CPT | Performed by: INTERNAL MEDICINE

## 2024-01-18 PROCEDURE — 97530 THERAPEUTIC ACTIVITIES: CPT | Mod: GO | Performed by: OCCUPATIONAL THERAPIST

## 2024-01-18 PROCEDURE — 2500000004 HC RX 250 GENERAL PHARMACY W/ HCPCS (ALT 636 FOR OP/ED): Performed by: PHYSICIAN ASSISTANT

## 2024-01-18 PROCEDURE — 82374 ASSAY BLOOD CARBON DIOXIDE: CPT | Performed by: INTERNAL MEDICINE

## 2024-01-18 PROCEDURE — 2500000001 HC RX 250 WO HCPCS SELF ADMINISTERED DRUGS (ALT 637 FOR MEDICARE OP): Performed by: HOSPITALIST

## 2024-01-18 PROCEDURE — 2500000004 HC RX 250 GENERAL PHARMACY W/ HCPCS (ALT 636 FOR OP/ED): Performed by: STUDENT IN AN ORGANIZED HEALTH CARE EDUCATION/TRAINING PROGRAM

## 2024-01-18 PROCEDURE — 97530 THERAPEUTIC ACTIVITIES: CPT | Mod: GP

## 2024-01-18 PROCEDURE — 1200000002 HC GENERAL ROOM WITH TELEMETRY DAILY

## 2024-01-18 PROCEDURE — 97535 SELF CARE MNGMENT TRAINING: CPT | Mod: GO | Performed by: OCCUPATIONAL THERAPIST

## 2024-01-18 RX ORDER — DULOXETIN HYDROCHLORIDE 30 MG/1
30 CAPSULE, DELAYED RELEASE ORAL DAILY
COMMUNITY

## 2024-01-18 RX ORDER — DULOXETIN HYDROCHLORIDE 30 MG/1
90 CAPSULE, DELAYED RELEASE ORAL DAILY
Status: DISCONTINUED | OUTPATIENT
Start: 2024-01-18 | End: 2024-01-24 | Stop reason: HOSPADM

## 2024-01-18 RX ORDER — DULOXETIN HYDROCHLORIDE 30 MG/1
30 CAPSULE, DELAYED RELEASE ORAL DAILY
Status: DISCONTINUED | OUTPATIENT
Start: 2024-01-18 | End: 2024-01-18 | Stop reason: WASHOUT

## 2024-01-18 RX ORDER — KETOROLAC TROMETHAMINE 30 MG/ML
15 INJECTION, SOLUTION INTRAMUSCULAR; INTRAVENOUS EVERY 6 HOURS
Status: DISPENSED | OUTPATIENT
Start: 2024-01-18 | End: 2024-01-19

## 2024-01-18 RX ORDER — DULOXETIN HYDROCHLORIDE 60 MG/1
60 CAPSULE, DELAYED RELEASE ORAL DAILY
COMMUNITY

## 2024-01-18 RX ADMIN — OXYCODONE HYDROCHLORIDE 5 MG: 5 TABLET ORAL at 10:31

## 2024-01-18 RX ADMIN — KETOROLAC TROMETHAMINE 15 MG: 30 INJECTION, SOLUTION INTRAMUSCULAR; INTRAVENOUS at 17:39

## 2024-01-18 RX ADMIN — TOPIRAMATE 100 MG: 25 TABLET, FILM COATED ORAL at 10:27

## 2024-01-18 RX ADMIN — GABAPENTIN 800 MG: 400 CAPSULE ORAL at 17:39

## 2024-01-18 RX ADMIN — DOXEPIN HYDROCHLORIDE 100 MG: 50 CAPSULE ORAL at 21:58

## 2024-01-18 RX ADMIN — GABAPENTIN 800 MG: 400 CAPSULE ORAL at 21:58

## 2024-01-18 RX ADMIN — DULOXETINE HYDROCHLORIDE 90 MG: 30 CAPSULE, DELAYED RELEASE ORAL at 12:49

## 2024-01-18 RX ADMIN — GABAPENTIN 800 MG: 400 CAPSULE ORAL at 10:28

## 2024-01-18 RX ADMIN — KETOROLAC TROMETHAMINE 15 MG: 30 INJECTION, SOLUTION INTRAMUSCULAR; INTRAVENOUS at 11:41

## 2024-01-18 RX ADMIN — TOPIRAMATE 200 MG: 25 TABLET, FILM COATED ORAL at 21:58

## 2024-01-18 RX ADMIN — METOPROLOL SUCCINATE 25 MG: 25 TABLET, EXTENDED RELEASE ORAL at 10:27

## 2024-01-18 RX ADMIN — KETOROLAC TROMETHAMINE 15 MG: 30 INJECTION, SOLUTION INTRAMUSCULAR; INTRAVENOUS at 21:57

## 2024-01-18 RX ADMIN — ACETAMINOPHEN 650 MG: 325 TABLET ORAL at 05:57

## 2024-01-18 RX ADMIN — OXYCODONE HYDROCHLORIDE 5 MG: 5 TABLET ORAL at 02:59

## 2024-01-18 RX ADMIN — PANTOPRAZOLE SODIUM 40 MG: 40 TABLET, DELAYED RELEASE ORAL at 05:57

## 2024-01-18 ASSESSMENT — COGNITIVE AND FUNCTIONAL STATUS - GENERAL
PERSONAL GROOMING: A LITTLE
HELP NEEDED FOR BATHING: A LOT
MOVING TO AND FROM BED TO CHAIR: A LOT
TURNING FROM BACK TO SIDE WHILE IN FLAT BAD: A LOT
TOILETING: TOTAL
DAILY ACTIVITIY SCORE: 14
CLIMB 3 TO 5 STEPS WITH RAILING: TOTAL
DRESSING REGULAR LOWER BODY CLOTHING: TOTAL
TURNING FROM BACK TO SIDE WHILE IN FLAT BAD: A LITTLE
CLIMB 3 TO 5 STEPS WITH RAILING: TOTAL
DRESSING REGULAR LOWER BODY CLOTHING: A LOT
CLIMB 3 TO 5 STEPS WITH RAILING: TOTAL
MOVING FROM LYING ON BACK TO SITTING ON SIDE OF FLAT BED WITH BEDRAILS: A LITTLE
PERSONAL GROOMING: A LITTLE
TOILETING: A LOT
DAILY ACTIVITIY SCORE: 16
WALKING IN HOSPITAL ROOM: A LOT
MOBILITY SCORE: 14
TURNING FROM BACK TO SIDE WHILE IN FLAT BAD: A LITTLE
MOBILITY SCORE: 14
DRESSING REGULAR UPPER BODY CLOTHING: A LITTLE
MOVING FROM LYING ON BACK TO SITTING ON SIDE OF FLAT BED WITH BEDRAILS: A LITTLE
WALKING IN HOSPITAL ROOM: TOTAL
MOVING TO AND FROM BED TO CHAIR: A LOT
STANDING UP FROM CHAIR USING ARMS: A LOT
DRESSING REGULAR UPPER BODY CLOTHING: A LITTLE
MOBILITY SCORE: 11
STANDING UP FROM CHAIR USING ARMS: A LITTLE
MOVING TO AND FROM BED TO CHAIR: A LOT
HELP NEEDED FOR BATHING: A LOT
STANDING UP FROM CHAIR USING ARMS: A LITTLE
MOVING FROM LYING ON BACK TO SITTING ON SIDE OF FLAT BED WITH BEDRAILS: A LITTLE
WALKING IN HOSPITAL ROOM: A LOT

## 2024-01-18 ASSESSMENT — PAIN - FUNCTIONAL ASSESSMENT: PAIN_FUNCTIONAL_ASSESSMENT: 0-10

## 2024-01-18 ASSESSMENT — PAIN SCALES - GENERAL
PAINLEVEL_OUTOF10: 8
PAINLEVEL_OUTOF10: 5 - MODERATE PAIN

## 2024-01-18 ASSESSMENT — ACTIVITIES OF DAILY LIVING (ADL): HOME_MANAGEMENT_TIME_ENTRY: 10

## 2024-01-18 NOTE — PROGRESS NOTES
01/18/2024 1000  Onsite visit from Ciera/ Liaison with [facility]. Facility accepts & is patient's FOC. Request for SNF to initiate AUTH was started.  Vashti BALES RN [facility]    01/18/2024 1530 AUTH approved from 01/18-01/ 25/2024. Provider updates MRI has been ordered. Updates to patient.. TCC will call family. Daughter/Bridget Rainey [phone].    Vashti BALES RN [facility]    01/18/2024 1630 TCC spoke daughter she endorses she was working with  [facility] for placement.  Daughter id POA and will need to work with [employer] patent has. Daughter is agreeable to plan for [facility].  Provided SNF address & phone number. Patient has MRI lumbar/ thoracic spine ordered prior to discharge. IMM consented.   Vashti BALES RN [facility]

## 2024-01-18 NOTE — DISCHARGE INSTRUCTIONS
Instructions after Open reduction and internal fixation of right ankle fracture     Diet: resume your regular diet    Activity: maintain splint on leg until follow up appointment. Do not bear-weight on your right leg, use crutches or walker for balance    No driving       Anticoagulation meds:  You will be discharged with a prescription for Aspirin 81 mg twice a day for 4 weeks for blood clot prevention.     Pain Meds: You will be sent home with a prescription for pain meds as well as a stool softener to help with constipation.  If you need a refill on your pain meds please contact Dr. Vaughn's office. A 48 hour notice will need to be given for all pain medication refills.       You have a splint applied to your right leg; do not allow to get wet. This will be removed at one of your follow up appointments with orthopedics.      -  apply a waterproof barrier for bathing     - check capillary refill: pinch toe. Should turn white to pink in 3 seconds or less      Follow-up: Dr. Vaughn's office in 1 week for wound check.       CALL PHYSICIAN:   Excessive nausea, vomiting, diarrhea greater than 24 hours.   Inability to urinate every 8-12 hours and bladder becomes too full and is painful.   Incision site: increased redness and or swelling; increased pain and or tenderness; progressive drainage or an unusual odor.  Call office if drainage continues beyond 5 days of surgery.   Excessive Bleeding: Slow general oozing that completely soaks dressing or fresh bright red bleeding or bleeding that will not stop.    Operative leg: has a change in color, numbness, tingling, and coldness to the touch or excessive pain.

## 2024-01-18 NOTE — CARE PLAN
The patient's goals for the shift include  pain contrl    The clinical goals for the shift include pt will remain comfortable and hemodynamically stable      Problem: Pain - Adult  Goal: Verbalizes/displays adequate comfort level or baseline comfort level  Outcome: Progressing     Problem: Discharge Planning  Goal: Discharge to home or other facility with appropriate resources  Outcome: Progressing     Problem: Skin  Goal: Decreased wound size/increased tissue granulation at next dressing change  Outcome: Progressing     Problem: Pain  Goal: Performs ADL's with improved pain control throughout shift  Outcome: Progressing

## 2024-01-18 NOTE — PROGRESS NOTES
Roby ParkerZeniaGarrett is a 78 y.o. female on day 4 of admission presenting with Back pain, unspecified back location, unspecified back pain laterality, unspecified chronicity.    Subjective   POD1 s/p R ankle ORIF. Doing well this am. Reports residual numbness from regional nerve block. Denies cp, sob, nv.        Objective     Gen: awake and alert; NAD  Resp: no increased WOB on RA  CV: RRR, extremities wwp  Abd: soft, non distended  MSK:  RLE  - splint in place, CDI  - residual numbness to light touch  - palpable DP pulse  - compartments soft    Last Recorded Vitals  Blood pressure 94/60, pulse 74, temperature 36.9 °C (98.4 °F), temperature source Oral, resp. rate 18, height 1.524 m (5'), weight 61.2 kg (134 lb 14.7 oz), SpO2 97 %.  Intake/Output last 3 Shifts:  I/O last 3 completed shifts:  In: 1145 (18.7 mL/kg) [P.O.:480; I.V.:665 (10.9 mL/kg)]  Out: 2050 (33.5 mL/kg) [Urine:2050 (0.9 mL/kg/hr)]  Weight: 61.2 kg     Relevant Results      Scheduled medications  enoxaparin, 40 mg, subcutaneous, q24h  gabapentin, 800 mg, oral, TID  ketamine, 0.5 mg/kg, intravenous, Once  metoprolol succinate XL, 25 mg, oral, Daily  pantoprazole, 40 mg, oral, Daily before breakfast   Or  pantoprazole, 40 mg, intravenous, Daily before breakfast  topiramate, 100 mg, oral, q AM  topiramate, 200 mg, oral, Nightly      Continuous medications  oxygen,       PRN medications  PRN medications: acetaminophen **OR** acetaminophen **OR** acetaminophen, doxepin, HYDROmorphone, ondansetron ODT **OR** ondansetron, oxyCODONE  Results for orders placed or performed during the hospital encounter of 01/14/24 (from the past 24 hour(s))   CBC   Result Value Ref Range    WBC 14.2 (H) 4.4 - 11.3 x10*3/uL    nRBC 0.0 0.0 - 0.0 /100 WBCs    RBC 3.61 (L) 4.00 - 5.20 x10*6/uL    Hemoglobin 10.6 (L) 12.0 - 16.0 g/dL    Hematocrit 32.2 (L) 36.0 - 46.0 %    MCV 89 80 - 100 fL    MCH 29.4 26.0 - 34.0 pg    MCHC 32.9 32.0 - 36.0 g/dL    RDW 15.4 (H) 11.5 - 14.5 %     Platelets 301 150 - 450 x10*3/uL   Basic Metabolic Panel   Result Value Ref Range    Glucose 101 (H) 74 - 99 mg/dL    Sodium 143 136 - 145 mmol/L    Potassium 4.1 3.5 - 5.3 mmol/L    Chloride 111 (H) 98 - 107 mmol/L    Bicarbonate 21 21 - 32 mmol/L    Anion Gap 15 10 - 20 mmol/L    Urea Nitrogen 9 6 - 23 mg/dL    Creatinine 0.49 (L) 0.50 - 1.05 mg/dL    eGFR >90 >60 mL/min/1.73m*2    Calcium 8.5 (L) 8.6 - 10.3 mg/dL                            Assessment/Plan   Principal Problem:    Back pain, unspecified back location, unspecified back pain laterality, unspecified chronicity  Active Problems:    Acute back pain, unspecified back location, unspecified back pain laterality    Acute right lumbar radiculopathy    Closed fracture of right ankle    Ankle dislocation, right, initial encounter    Unable to ambulate    Unable to care for self    Right leg weakness    History of migraine headaches    Post-op plan:  - NWB RLE in splint  - PT/OT  - ancef x 24hr  - DVT ppx per primary; recommend minimum asa 81 BID x 6 weeks  - multimodal pain control  - follow up with Dr Escudero in clinic in 2 weeks for first post-op visit  - remainder of care per primary           Joey John MD

## 2024-01-18 NOTE — PROGRESS NOTES
Occupational Therapy    Occupational Therapy Treatment    Name: Roby Qureshi  MRN: [id number]  : 1945  Date: 24  Time Calculation  Start Time: 1530  Stop Time: 1554  Time Calculation (min): 24 min    Assessment:  OT Assessment: Pt continues to demonstrate decreased balance, endurance and ability to maintain NWB status of RLE s/p surgery.  Functional mobility and independence of ADLs is directly impacted.  Pt would continue to benefit from skilled OT services  Prognosis: Fair  Barriers to Discharge: Decreased caregiver support  Evaluation/Treatment Tolerance: Treatment limited secondary to agitation, Patient limited by pain, Patient limited by fatigue  Medical Staff Made Aware: Yes  End of Session Communication: Bedside nurse  End of Session Patient Position: Bed, 3 rail up, Alarm on  Plan:  Treatment Interventions: ADL retraining, Functional transfer training, Endurance training, Equipment evaluation/education, Neuromuscular reeducation, Compensatory technique education  OT Frequency: 3 times per week  OT Discharge Recommendations: Moderate intensity level of continued care  Equipment Recommended upon Discharge: Wheeled walker  OT Recommended Transfer Status: Moderate assist, Assist of 1  OT - OK to Discharge: Yes    Subjective   Previous Visit Info:  OT Last Visit  OT Received On: 24  General:  General  Reason for Referral: 77 y/o F presenting with intractable right back and leg pain as well as fx of the distal fibula with dislocation.  Referred By: Rachel Garcia PA-C  Past Medical History Relevant to Rehab: Lumbar radiculopathy, chronic back pain, laminectomy, SVT  Family/Caregiver Present: No  Prior to Session Communication: Bedside nurse  Patient Position Received: Up in chair  Preferred Learning Style: verbal, visual  General Comment: Pt anxious about being up in chair and requesting to return to bed  Precautions:  LE Weight Bearing Status: Right Non-Weight Bearing  Medical  Precautions: Fall precautions  Precautions Comment: Pt required max cues for RLE NWB precautions; pt unable to maintain during functional activity    Objective   Activities of Daily Living: Grooming  Grooming Level of Assistance: Setup, Moderate assistance  Grooming Where Assessed: Chair  Grooming Comments: Pt requesting to complete grooming routine; pt encouraged to complete as much as possible prior to needing assistance to promote independence and funcitonal endurance.  Encouraged/attempted to have pt complete some of task in standing with support and maintain RLE NWB.  Task not completed in standing    LE Dressing  LE Dressing:  (pt is dependent for LB dressing; time was spent discussing compensatory skills and use of AE to increase independence and maintain NWB)    Functional Standing Tolerance:     Bed Mobility/Transfers: Bed Mobility  Bed Mobility: Yes  Bed Mobility 2  Bed Mobility  2: Sitting to supine  Level of Assistance 2: Set up, Moderate assistance, Moderate verbal cues (x2)  Bed Mobility Comments 2: assist with RLE; pt fatigued; Dependent for repositioning    Transfers  Transfer: Yes  Transfer 1  Transfer From 1: Chair with arms to  Transfer to 1: Bed  Technique 1: Squat pivot  Transfer Device 1: Walker  Transfer Level of Assistance 1: Set up, Moderate verbal cues, Moderate assistance, Moderate tactile cues  Trials/Comments 1: education and continuous verbal cues for sequencing, body and hand position; position of walker.  Pt with decreased tolerance and ability to balance and stand while maintaining NWB      Outcome Measures:  [facility] Daily Activity  Putting on and taking off regular lower body clothing: Total  Bathing (including washing, rinsing, drying): A lot  Putting on and taking off regular upper body clothing: A little  Toileting, which includes using toilet, bedpan or urinal: Total  Taking care of personal grooming such as brushing teeth: A little  Eating Meals: None  Daily Activity - Total  Score: 14        Education Documentation  Body Mechanics, taught by Estephania Nye OT at 1/18/2024  5:16 PM.  Learner: Patient  Readiness: Acceptance  Method: Explanation  Response: Needs Reinforcement, Verbalizes Understanding    Precautions, taught by Estephania Nye OT at 1/18/2024  5:16 PM.  Learner: Patient  Readiness: Acceptance  Method: Explanation  Response: Needs Reinforcement, Verbalizes Understanding    Handouts, taught by Estephania Nye OT at 1/18/2024  5:16 PM.  Learner: Patient  Readiness: Acceptance  Method: Explanation  Response: Needs Reinforcement, Verbalizes Understanding    ADL Training, taught by Estephania Nye OT at 1/18/2024  5:16 PM.  Learner: Patient  Readiness: Acceptance  Method: Explanation  Response: Needs Reinforcement, Verbalizes Understanding    Education Comments  No comments found.      Goals:  Encounter Problems       Encounter Problems (Active)       ADLs       Patient will perform upper and lower body bathing with minimal assist  level of assistance and grab bars and shower chair. (Progressing)       Start:  01/16/24    Expected End:  01/30/24            Patient with complete lower body dressing with minimal assist  level of assistance donning and doffing all LE clothes  with PRN adaptive equipment while edge of bed  (Progressing)       Start:  01/16/24    Expected End:  01/30/24            Patient will complete daily grooming tasks with stand by assist level of assistance and PRN adaptive equipment while standing. (Progressing)       Start:  01/16/24    Expected End:  01/30/24            Patient will complete toileting including hygiene clothing management/hygiene with minimal assist  level of assistance and raised toilet seat and grab bars. (Progressing)       Start:  01/16/24    Expected End:  01/30/24                 Balance       Goal 1 (Progressing)       Start:  01/16/24    Expected End:  01/30/24       Pt performs all sitting balance with close supervision and standing balance  with CGA and LRAD            COGNITION/SAFETY       Patient will recall and adhere to weight bearing restrictions with all ADL and functional mobility in order to promote healing and safety with functional tasks (Not Progressing)       Start:  01/16/24    Expected End:  01/30/24               MOBILITY       Patient will perform Functional mobility x Household distances/Community Distances with stand by assist level of assistance and least restrictive device in order to improve safety and functional mobility. (Not Progressing)       Start:  01/16/24    Expected End:  01/30/24                 TRANSFERS       Patient will complete functional transfer to [facility] with least restrictive device with stand by assist level of assistance. (Progressing)       Start:  01/16/24    Expected End:  01/30/24            Patient will complete sit to stand transfer with stand by assist level of assistance and least restrictive device in order to improve safety and prepare for out of bed mobility. (Progressing)       Start:  01/16/24    Expected End:  01/30/24

## 2024-01-18 NOTE — PROGRESS NOTES
Roby Qureshi is a 78 y.o. female on day 4 of admission presenting with Back pain, unspecified back location, unspecified back pain laterality, unspecified chronicity.      Subjective   Patient was seen and examined at bedside this morning    Objective     Last Recorded Vitals  BP 94/60 (BP Location: Right arm, Patient Position: Lying)   Pulse 74   Temp 36.9 °C (98.4 °F) (Oral)   Resp 18   Wt 61.2 kg (134 lb 14.7 oz)   SpO2 97%   Intake/Output last 3 Shifts:    Intake/Output Summary (Last 24 hours) at 1/18/2024 1043  Last data filed at 1/18/2024 0038  Gross per 24 hour   Intake 240 ml   Output 1250 ml   Net -1010 ml         Admission Weight  Weight: 61.2 kg (135 lb) (01/14/24 1026)    Daily Weight  01/17/24 : 61.2 kg (134 lb 14.7 oz)    Image Results  FL less than 1 hour  These images are not reportable by radiology and will not be interpreted   by  Radiologists.      Physical Exam  Constitutional: Elderly female, alert active, cooperative not in acute distress  Eyes: PERRLA, clear sclera  ENMT: Moist mucosal membranes, no exudate  Head / Neck: Atraumatic, normocephalic, supple neck, JVP not visualized  Lungs: Patent airways, CTABL  Heart: RRR, S1S2, no murmurs appreciated, palpable pulses in all extremities  GI: Soft, NT, ND, bowel sounds present in all quadrants  MSK: Moves all extremities freely with tenderness and limited ROM with rotation, sidebending flexion extension of the lumbar spine, no joint edema  Extremities: Intact x 4, no peripheral edema  : No Crouch catheter inserted  Breast: Deferred  Neurological: AAO x 3 to person, place and date, facial muscles symmetrical, sensation intact, strength 4/4, no acute focal neurological deficits appreciated  Psychological: Appropriate mood and behavior    Relevant Results           Scheduled medications  enoxaparin, 40 mg, subcutaneous, q24h  gabapentin, 800 mg, oral, TID  ketamine, 0.5 mg/kg, intravenous, Once  ketorolac, 15 mg, intravenous,  q6h  metoprolol succinate XL, 25 mg, oral, Daily  pantoprazole, 40 mg, oral, Daily before breakfast   Or  pantoprazole, 40 mg, intravenous, Daily before breakfast  topiramate, 100 mg, oral, q AM  topiramate, 200 mg, oral, Nightly      Continuous medications  oxygen,       PRN medications  PRN medications: acetaminophen **OR** acetaminophen **OR** acetaminophen, doxepin, HYDROmorphone, ondansetron ODT **OR** ondansetron, oxyCODONE      Assessment/Plan        78 y.o. female presenting with intractable right lower back and leg pain, acute exacerbation of chronic back and leg pain, acute fracture of the distal fibula, acute ankle dislocation, unable to ambulate, unable to care for self, right leg weakness..  Patient with a history of chronic back pain and lumbar radiculopathy on the right with multiple injections, status postlaminectomy.  Patient is followed by pain management.  She has had multiple ED visits and hospitalizations for the leg and back pain     Principal Problem:    Back pain, unspecified back location, unspecified back pain laterality, unspecified chronicity  Active Problems:    Acute back pain, unspecified back location, unspecified back pain laterality    Acute right lumbar radiculopathy    Closed fracture of right ankle    Ankle dislocation, right, initial encounter    Unable to ambulate    Unable to care for self    Right leg weakness    History of migraine headaches       Right bimalleolar acute bilateral ankle fracture  -Status post mechanical fall, reportedly multiple times at her place  -Limited METS due to chronic pain, but has no major cardiopulmonary comorbidities, most recent ECG reviewed, has history of arrhythmia but currently stable, chest x-ray (December 28, 2023) reviewed  -Patient with moderate risk, but medically optimized to proceed with surgery  -s/p ORIF of right ankle   -DVT ppx on discharge- recommend minimum asa 81 BID x 6 weeks     Chronic back pain: Presenting with excruciating  sciatica pain  -History of multiple surgeries and epidural injections  -States following the surgeon for spinal nerve stimulation  -Dilaudid 0.4 mg IV push every 4 hours as needed severe pain  -Percocet 5-325 mg 1 tablet every 6 hours as needed breakthrough pain  -Cyclobenzaprine 10 mg 3 times daily as needed muscle spasm  -Continue duloxetine 60 mg daily  -Gabapentin 800 mg daily  -Pain management consulted  -MRI T and L spine ordered, pending     History of arrhythmia  -On metoprolol 25 mg daily     Depression  -Venlafaxine  mg daily  -Topiramate 100 mg daily, also off label use for pain management     Diet  -Regular     DVT prophylaxis  -Lovenox 40 mg subcu daily     CODE STATUS: Full     Disposition: Presenting with chronic low back pain, need further evaluation and management, discharge pending pain management evaluation and recommendation. MRI T and L spine ordered, pending      Destiny Schilling MD

## 2024-01-18 NOTE — PROGRESS NOTES
Physical Therapy    Physical Therapy Treatment    Patient Name: Roby Qureshi  MRN: [id number]  Today's Date: 1/18/2024  Time Calculation  Start Time: 1355  Stop Time: 1421  Time Calculation (min): 26 min     Assessment/Plan   PT Assessment  PT Assessment Results: Decreased strength, Decreased endurance, Impaired balance, Decreased mobility, Pain  Rehab Prognosis: Good  Evaluation/Treatment Tolerance: Patient limited by pain  Medical Staff Made Aware: Yes  Strengths: Ability to acquire knowledge  Barriers to Participation: Comorbidities, Attitude of self, Coping skills  End of Session Communication: Bedside nurse  Assessment Comment: Pt presents today with improved activity tolerance since last session with PT. Pt continues to demonstrate deficits in strength, balance, and increased pain in the right ankle. Continued PT during the current admission would benefit the pt to continued OOB mobility with a focus on adherance to weight bearing status.  End of Session Patient Position: Up in chair, Alarm on  PT Plan  Inpatient/Swing Bed or Outpatient: Inpatient  PT Plan  Treatment/Interventions: Bed mobility, Transfer training, Gait training, Balance training, Strengthening, Endurance training, Therapeutic exercise, Therapeutic activity, Home exercise program, Positioning  PT Plan: Skilled PT  PT Frequency: 4 times per week  PT Discharge Recommendations: Moderate intensity level of continued care  Equipment Recommended upon Discharge: Wheeled walker  PT Recommended Transfer Status: Assist x1, Assistive device  PT - OK to Discharge: Yes (Per PT POC)    General Visit Information:   PT  Visit  PT Received On: 01/18/24  General  Reason for Referral: 77 y/o F presenting with intractable right back and leg pain as well as fx of the distal fibula with dislocation.  Past Medical History Relevant to Rehab: Lumbar radiculopathy, chronic back pain, laminectomy, SVT  Family/Caregiver Present: No  Prior to Session Communication:  Bedside nurse  Patient Position Received: Bed, 3 rail up  Preferred Learning Style: verbal, visual  General Comment: Pt supine in bed upon PT arrival. Cleared to participate with RN, and agreeable to transfer training with PT.    Subjective   Precautions:  Precautions  LE Weight Bearing Status: Right Non-Weight Bearing (RLE)  Medical Precautions: Fall precautions    Objective   Pain:  Pain Assessment  Pain Assessment: 0-10  Pain Score: 5 - Moderate pain  Pain Type: Surgical pain  Pain Location: Ankle  Pain Orientation: Right    Postural Control:  Postural Control  Postural Control: Impaired  Posture Comment: Forward head and trunk flexion in standing with FWW  Static Sitting Balance  Static Sitting-Balance Support: Bilateral upper extremity supported  Static Sitting-Level of Assistance: Close supervision  Dynamic Sitting Balance  Dynamic Sitting-Balance Support: Right upper extremity supported  Dynamic Sitting-Balance: Lateral lean  Dynamic Sitting-Comments: Pt able to lean right with RUE support on bed during donning of hospital gown. No apparent LOB. Pt also able to push into upright sitting from a right leaning position using RUE push from bed without apparent LOB.  Static Standing Balance  Static Standing-Balance Support: Bilateral upper extremity supported  Extremity/Trunk Assessments:  Strength RLE  R Hip Flexion:  (At least 3/5)  Strength LLE  LLE Overall Strength: Greater than or equal to 3/5 as evidenced by functional mobility  Activity Tolerance:  Activity Tolerance  Endurance: Tolerates 10 - 20 min exercise with multiple rests  Treatments:  Bed Mobility  Bed Mobility: Yes  Bed Mobility 1  Bed Mobility 1: Supine to sitting  Level of Assistance 1: Contact guard, Minimal verbal cues, Minimal tactile cues  Bed Mobility Comments 1: HOB elevated. Pt able to progress BLE off the EOB. VC/TC for RUE grab on bed rail to assist with trunk into upright sitting position.     Transfers  Transfer: Yes  Transfer  1  Transfer From 1: Bed to  Transfer to 1: Chair with arms  Technique 1: Stand pivot  Transfer Device 1: Walker, Gait belt  Transfer Level of Assistance 1: Moderate verbal cues, Moderate tactile cues, Moderate assistance  Trials/Comments 1: x2 trials. VC for BUE push from bed instead of pulling to stand from walker. Unsuccessful stand on first attempt. Successful stand on second attempt. Pt unable to maintain NWB status on RLE without assist. VC/TC for weight shift onn RLE and greater use of weightbearing through arms. Pt able to hop about 3-4 times from bed to chair. Assist with BLE positioning against chair before sitting. VC for UE reach for armrest with return demonstration. Fair eccentric control on descent to chair.    Outcome Measures:  [facility] Basic Mobility  Turning from your back to your side while in a flat bed without using bedrails: A little  Moving from lying on your back to sitting on the side of a flat bed without using bedrails: A little  Moving to and from bed to chair (including a wheelchair): A lot  Standing up from a chair using your arms (e.g. wheelchair or bedside chair): A little  To walk in hospital room: A lot  Climbing 3-5 steps with railing: Total  Basic Mobility - Total Score: 14    Education Documentation  Precautions, taught by Joey Allen PT at 1/18/2024  3:10 PM.  Learner: Patient  Readiness: Acceptance  Method: Explanation  Response: Verbalizes Understanding    Body Mechanics, taught by Joey Allen PT at 1/18/2024  3:10 PM.  Learner: Patient  Readiness: Acceptance  Method: Explanation  Response: Verbalizes Understanding    Mobility Training, taught by Joey Allen PT at 1/18/2024  3:10 PM.  Learner: Patient  Readiness: Acceptance  Method: Explanation  Response: Verbalizes Understanding    Education Comments  No comments found.    OP EDUCATION:     Encounter Problems       Encounter Problems (Active)       Balance       Goal 1 (Progressing)       Start:  01/16/24     Expected End:  01/30/24       Pt performs all sitting balance with close supervision and standing balance with CGA and LRAD            Mobility       STG - Patient will ambulate (Progressing)       Start:  01/16/24    Expected End:  01/30/24       15 ft with CGA and LRAD while adhering to relevant weight-bearing precautions            PT Problem       PT Goal 1 (Not Progressing)       Start:  01/16/24    Expected End:  01/30/24       Pt demonstrates IND in performing 2 sets of 15 reps of prescribed BLE            Transfers       STG - Patient to transfer to and from sit to supine (Progressing)       Start:  01/16/24    Expected End:  01/30/24       With close supervision         STG - Patient will transfer sit to and from stand (Progressing)       Start:  01/16/24    Expected End:  01/30/24       With min assist x 1 and LRAD

## 2024-01-18 NOTE — CARE PLAN
The patient's goals for the shift include      The clinical goals for the shift include pt will remain comfortable and hemodynamically stable

## 2024-01-19 LAB
ANION GAP SERPL CALC-SCNC: 12 MMOL/L (ref 10–20)
BUN SERPL-MCNC: 18 MG/DL (ref 6–23)
CALCIUM SERPL-MCNC: 8.3 MG/DL (ref 8.6–10.3)
CHLORIDE SERPL-SCNC: 110 MMOL/L (ref 98–107)
CO2 SERPL-SCNC: 22 MMOL/L (ref 21–32)
CREAT SERPL-MCNC: 0.54 MG/DL (ref 0.5–1.05)
EGFRCR SERPLBLD CKD-EPI 2021: >90 ML/MIN/1.73M*2
ERYTHROCYTE [DISTWIDTH] IN BLOOD BY AUTOMATED COUNT: 15.9 % (ref 11.5–14.5)
GLUCOSE SERPL-MCNC: 87 MG/DL (ref 74–99)
HCT VFR BLD AUTO: 33.3 % (ref 36–46)
HGB BLD-MCNC: 10.2 G/DL (ref 12–16)
MCH RBC QN AUTO: 29.1 PG (ref 26–34)
MCHC RBC AUTO-ENTMCNC: 30.6 G/DL (ref 32–36)
MCV RBC AUTO: 95 FL (ref 80–100)
NRBC BLD-RTO: 0 /100 WBCS (ref 0–0)
PLATELET # BLD AUTO: 298 X10*3/UL (ref 150–450)
POTASSIUM SERPL-SCNC: 4 MMOL/L (ref 3.5–5.3)
RBC # BLD AUTO: 3.51 X10*6/UL (ref 4–5.2)
SODIUM SERPL-SCNC: 140 MMOL/L (ref 136–145)
WBC # BLD AUTO: 10.3 X10*3/UL (ref 4.4–11.3)

## 2024-01-19 PROCEDURE — 2500000004 HC RX 250 GENERAL PHARMACY W/ HCPCS (ALT 636 FOR OP/ED): Performed by: PHYSICIAN ASSISTANT

## 2024-01-19 PROCEDURE — 2500000001 HC RX 250 WO HCPCS SELF ADMINISTERED DRUGS (ALT 637 FOR MEDICARE OP): Performed by: INTERNAL MEDICINE

## 2024-01-19 PROCEDURE — 1200000002 HC GENERAL ROOM WITH TELEMETRY DAILY

## 2024-01-19 PROCEDURE — 2500000001 HC RX 250 WO HCPCS SELF ADMINISTERED DRUGS (ALT 637 FOR MEDICARE OP): Performed by: PHYSICIAN ASSISTANT

## 2024-01-19 PROCEDURE — 85027 COMPLETE CBC AUTOMATED: CPT | Performed by: INTERNAL MEDICINE

## 2024-01-19 PROCEDURE — 97110 THERAPEUTIC EXERCISES: CPT | Mod: GP

## 2024-01-19 PROCEDURE — 2500000004 HC RX 250 GENERAL PHARMACY W/ HCPCS (ALT 636 FOR OP/ED): Performed by: STUDENT IN AN ORGANIZED HEALTH CARE EDUCATION/TRAINING PROGRAM

## 2024-01-19 PROCEDURE — 2500000001 HC RX 250 WO HCPCS SELF ADMINISTERED DRUGS (ALT 637 FOR MEDICARE OP): Performed by: HOSPITALIST

## 2024-01-19 PROCEDURE — 80048 BASIC METABOLIC PNL TOTAL CA: CPT | Performed by: INTERNAL MEDICINE

## 2024-01-19 PROCEDURE — 99233 SBSQ HOSP IP/OBS HIGH 50: CPT | Performed by: INTERNAL MEDICINE

## 2024-01-19 PROCEDURE — 36415 COLL VENOUS BLD VENIPUNCTURE: CPT | Performed by: INTERNAL MEDICINE

## 2024-01-19 RX ORDER — DOCUSATE SODIUM 100 MG/1
100 CAPSULE, LIQUID FILLED ORAL 2 TIMES DAILY
Status: DISCONTINUED | OUTPATIENT
Start: 2024-01-19 | End: 2024-01-24 | Stop reason: HOSPADM

## 2024-01-19 RX ADMIN — ACETAMINOPHEN 650 MG: 325 TABLET ORAL at 14:39

## 2024-01-19 RX ADMIN — OXYCODONE HYDROCHLORIDE 5 MG: 5 TABLET ORAL at 06:23

## 2024-01-19 RX ADMIN — GABAPENTIN 800 MG: 400 CAPSULE ORAL at 09:08

## 2024-01-19 RX ADMIN — HYDROMORPHONE HYDROCHLORIDE 0.5 MG: 1 INJECTION, SOLUTION INTRAMUSCULAR; INTRAVENOUS; SUBCUTANEOUS at 17:41

## 2024-01-19 RX ADMIN — OXYCODONE HYDROCHLORIDE 5 MG: 5 TABLET ORAL at 14:40

## 2024-01-19 RX ADMIN — GABAPENTIN 800 MG: 400 CAPSULE ORAL at 14:39

## 2024-01-19 RX ADMIN — DOCUSATE SODIUM 100 MG: 100 CAPSULE, LIQUID FILLED ORAL at 20:07

## 2024-01-19 RX ADMIN — GABAPENTIN 800 MG: 400 CAPSULE ORAL at 20:07

## 2024-01-19 RX ADMIN — TOPIRAMATE 200 MG: 25 TABLET, FILM COATED ORAL at 20:06

## 2024-01-19 RX ADMIN — TOPIRAMATE 100 MG: 25 TABLET, FILM COATED ORAL at 09:08

## 2024-01-19 RX ADMIN — PANTOPRAZOLE SODIUM 40 MG: 40 TABLET, DELAYED RELEASE ORAL at 06:23

## 2024-01-19 RX ADMIN — OXYCODONE HYDROCHLORIDE 5 MG: 5 TABLET ORAL at 20:07

## 2024-01-19 RX ADMIN — DOXEPIN HYDROCHLORIDE 100 MG: 50 CAPSULE ORAL at 20:07

## 2024-01-19 RX ADMIN — DULOXETINE HYDROCHLORIDE 90 MG: 30 CAPSULE, DELAYED RELEASE ORAL at 09:08

## 2024-01-19 RX ADMIN — KETOROLAC TROMETHAMINE 15 MG: 30 INJECTION, SOLUTION INTRAMUSCULAR; INTRAVENOUS at 11:19

## 2024-01-19 ASSESSMENT — PAIN DESCRIPTION - ORIENTATION
ORIENTATION: RIGHT
ORIENTATION: RIGHT

## 2024-01-19 ASSESSMENT — PAIN - FUNCTIONAL ASSESSMENT
PAIN_FUNCTIONAL_ASSESSMENT: 0-10

## 2024-01-19 ASSESSMENT — PAIN DESCRIPTION - LOCATION
LOCATION: ANKLE

## 2024-01-19 ASSESSMENT — PAIN SCALES - GENERAL
PAINLEVEL_OUTOF10: 7
PAINLEVEL_OUTOF10: 8

## 2024-01-19 ASSESSMENT — COGNITIVE AND FUNCTIONAL STATUS - GENERAL
DRESSING REGULAR LOWER BODY CLOTHING: TOTAL
DRESSING REGULAR UPPER BODY CLOTHING: A LITTLE
CLIMB 3 TO 5 STEPS WITH RAILING: TOTAL
CLIMB 3 TO 5 STEPS WITH RAILING: TOTAL
MOBILITY SCORE: 13
MOVING TO AND FROM BED TO CHAIR: A LOT
MOBILITY SCORE: 14
STANDING UP FROM CHAIR USING ARMS: A LOT
DAILY ACTIVITIY SCORE: 14
STANDING UP FROM CHAIR USING ARMS: A LITTLE
MOVING FROM LYING ON BACK TO SITTING ON SIDE OF FLAT BED WITH BEDRAILS: A LITTLE
WALKING IN HOSPITAL ROOM: A LOT
HELP NEEDED FOR BATHING: A LOT
MOVING FROM LYING ON BACK TO SITTING ON SIDE OF FLAT BED WITH BEDRAILS: A LITTLE
PERSONAL GROOMING: A LITTLE
TURNING FROM BACK TO SIDE WHILE IN FLAT BAD: A LITTLE
TOILETING: TOTAL
TURNING FROM BACK TO SIDE WHILE IN FLAT BAD: A LITTLE
MOVING TO AND FROM BED TO CHAIR: A LOT
WALKING IN HOSPITAL ROOM: A LOT
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
## 2024-01-19 NOTE — PROGRESS NOTES
Physical Therapy    Physical Therapy Treatment    Patient Name: Roby Qureshi  MRN: [id number]  Today's Date: 1/19/2024  Time Calculation  Start Time: 1152  Stop Time: 1208  Time Calculation (min): 16 min       Assessment/Plan   PT Assessment  PT Assessment Results: Decreased strength, Decreased endurance, Impaired balance, Decreased mobility, Pain  Rehab Prognosis: Good  Barriers to Discharge: Ability to maintain weight-bearing precautions  Evaluation/Treatment Tolerance: Patient limited by fatigue  Medical Staff Made Aware: Yes  Strengths: Ability to acquire knowledge  Barriers to Participation: Insight into problems  End of Session Communication: Bedside nurse  Assessment Comment: Pt presents today with difficulty maintaining weight-bearing precautions on the RLE. Pt was able to tolerate BLE HEp without report of pain. Continued PT during the current admission would benefit the pt to attempt progression in OOB mobility with review of weight-bearing precautions.  End of Session Patient Position: Up in chair, Alarm on  PT Plan  Inpatient/Swing Bed or Outpatient: Inpatient  PT Plan  Treatment/Interventions: Bed mobility, Transfer training, Gait training, Balance training, Strengthening, Endurance training, Therapeutic exercise, Therapeutic activity, Home exercise program  PT Plan: Skilled PT  PT Frequency: 4 times per week  PT Discharge Recommendations: Moderate intensity level of continued care  Equipment Recommended upon Discharge: Wheeled walker  PT Recommended Transfer Status: Assist x1, Assistive device (mod assist to maintain wieght bearing precautions)  PT - OK to Discharge: Yes (Per PT POC)    General Visit Information:   PT  Visit  PT Received On: 01/19/24  Response to Previous Treatment: Patient with no complaints from previous session.  General  Reason for Referral: 79 y/o F presenting with intractable right back and leg pain as well as fx of the distal fibula with dislocation.  Past Medical History  Relevant to Rehab: Lumbar radiculopathy, chronic back pain, laminectomy, SVT  Family/Caregiver Present: No  Prior to Session Communication: Bedside nurse  Patient Position Received: Bed, 3 rail up  Preferred Learning Style: kinesthetic, verbal, visual  General Comment: Pt supine in bed upon PT arrival. Cleared to participate with RN, and agreable to PT treatment. Pt expresses interest in reading books during hospitalization, but does not have her reading glasses.    Subjective   Precautions:  Precautions  LE Weight Bearing Status: Right Non-Weight Bearing  Medical Precautions: Fall precautions    Objective   Pain:  Pain Assessment  Pain Assessment: 0-10  Pain Score: 8  Pain Type: Acute pain, Surgical pain  Pain Location: Ankle  Pain Orientation: Right  Cognition:  Cognition  Unable to Self-Monitor and Self-Correct Consistently: Maximal  Postural Control:  Postural Control  Postural Control: Impaired  Posture Comment: Forward flexed posture at trunk in standing with FWW  Static Sitting Balance  Static Sitting-Balance Support: Bilateral upper extremity supported, Feet supported  Static Sitting-Level of Assistance: Close supervision  Dynamic Sitting Balance  Dynamic Sitting-Balance Support: Right upper extremity supported  Dynamic Sitting-Balance: Lateral lean  Dynamic Sitting-Comments: Pt able to lean right with RUE support on bed during donning of [facility] gown. No apparent LOB. Pt also able to push into upright sitting from a right leaning position using RUE push from bed without apparent LOB.  Static Standing Balance  Static Standing-Balance Support: Bilateral upper extremity supported  Dynamic Standing Balance  Dynamic Standing-Balance Support: Bilateral upper extremity supported  Extremity/Trunk Assessments:  Strength RLE  R Hip Flexion:  (At least 3/5)  R Knee Extension:  (At least 3/5)  Strength LLE  LLE Overall Strength: Greater than or equal to 3/5 as evidenced by functional mobility  Activity  Tolerance:  Activity Tolerance  Endurance: Tolerates 10 - 20 min exercise with multiple rests  Treatments:  Therapeutic Exercise  Therapeutic Exercise Performed: Yes  Therapeutic Exercise Activity 1: Pt performed the following exercises in sitting: Marches 1x10, LAQ 1x10, AP (LLE) 1x15, hip ab/adduction 1x10, GS 1x10. VC for controlled descent during marching activity.    Bed Mobility  Bed Mobility: Yes  Bed Mobility 1  Bed Mobility 1: Supine to sitting  Level of Assistance 1: Close supervision  Bed Mobility Comments 1: HOB elevated. Pt able to progress BLE off the EOB and bring trunk into upright sitting using pull from the bed rail.       Transfers  Transfer: Yes  Transfer 1  Transfer From 1: Bed to  Transfer to 1: Chair with arms  Technique 1: Stand pivot  Transfer Device 1: Walker, Gait belt  Transfer Level of Assistance 1: Moderate assistance, Moderate verbal cues  Trials/Comments 1: VC for BUE push from bed to stand instead of pulling form walker without return demonstration. Pt demonstrates difficulty maintaining NWB status on RLE. VC with increased weight bearing through UE and assist with weight shifting off of RLE. Assist with BLE positioning against chair before sitting. VC for UE reach for armrest of chair with return demonstration. Assist with eccentric descent to chair.    Stairs  Stairs: No    Outcome Measures:  [facility] Basic Mobility  Turning from your back to your side while in a flat bed without using bedrails: A little  Moving from lying on your back to sitting on the side of a flat bed without using bedrails: A little  Moving to and from bed to chair (including a wheelchair): A lot  Standing up from a chair using your arms (e.g. wheelchair or bedside chair): A lot  To walk in hospital room: A lot  Climbing 3-5 steps with railing: Total  Basic Mobility - Total Score: 13    Education Documentation  Precautions, taught by Joey Allen PT at 1/19/2024  1:20 PM.  Learner: Patient  Readiness:  Acceptance  Method: Explanation  Response: Verbalizes Understanding, Needs Reinforcement    Body Mechanics, taught by Joey Allen PT at 1/19/2024  1:20 PM.  Learner: Patient  Readiness: Acceptance  Method: Explanation  Response: Verbalizes Understanding, Needs Reinforcement    Mobility Training, taught by Joey Allen PT at 1/19/2024  1:20 PM.  Learner: Patient  Readiness: Acceptance  Method: Explanation  Response: Verbalizes Understanding, Needs Reinforcement    Education Comments  No comments found.        OP EDUCATION:       Encounter Problems       Encounter Problems (Active)       Balance       Goal 1 (Progressing)       Start:  01/16/24    Expected End:  01/30/24       Pt performs all sitting balance with close supervision and standing balance with CGA and LRAD            Mobility       STG - Patient will ambulate (Not Progressing)       Start:  01/16/24    Expected End:  01/30/24       15 ft with CGA and LRAD while adhering to relevant weight-bearing precautions            PT Problem       PT Goal 1 (Progressing)       Start:  01/16/24    Expected End:  01/30/24       Pt demonstrates IND in performing 2 sets of 15 reps of prescribed BLE            Transfers       STG - Patient to transfer to and from sit to supine (Progressing)       Start:  01/16/24    Expected End:  01/30/24       With close supervision         STG - Patient will transfer sit to and from stand (Not Progressing)       Start:  01/16/24    Expected End:  01/30/24       With min assist x 1 and LRAD

## 2024-01-19 NOTE — PROGRESS NOTES
Roby Qureshi is a 78 y.o. female on day 5 of admission presenting with Back pain, unspecified back location, unspecified back pain laterality, unspecified chronicity.      Subjective   Patient was seen and examined at bedside this morning    Objective     Last Recorded Vitals  BP 84/52 (BP Location: Right arm, Patient Position: Lying)   Pulse 62   Temp 36.8 °C (98.2 °F) (Oral)   Resp 18   Wt 61.2 kg (134 lb 14.7 oz)   SpO2 99%   Intake/Output last 3 Shifts:    Intake/Output Summary (Last 24 hours) at 1/19/2024 1129  Last data filed at 1/19/2024 1000  Gross per 24 hour   Intake 500 ml   Output 1150 ml   Net -650 ml         Admission Weight  Weight: 61.2 kg (135 lb) (01/14/24 1026)    Daily Weight  01/17/24 : 61.2 kg (134 lb 14.7 oz)    Image Results  FL less than 1 hour  These images are not reportable by radiology and will not be interpreted   by  Radiologists.      Physical Exam  Constitutional: Elderly female, alert active, cooperative not in acute distress  Eyes: PERRLA, clear sclera  ENMT: Moist mucosal membranes, no exudate  Head / Neck: Atraumatic, normocephalic, supple neck, JVP not visualized  Lungs: Patent airways, CTABL  Heart: RRR, S1S2, no murmurs appreciated, palpable pulses in all extremities  GI: Soft, NT, ND, bowel sounds present in all quadrants  MSK: Moves all extremities freely with tenderness and limited ROM with rotation, sidebending flexion extension of the lumbar spine, no joint edema  Extremities: Intact x 4, no peripheral edema  : No Crouch catheter inserted  Breast: Deferred  Neurological: AAO x 3 to person, place and date, facial muscles symmetrical, sensation intact, strength 4/4, no acute focal neurological deficits appreciated  Psychological: Appropriate mood and behavior    Relevant Results           Scheduled medications  DULoxetine, 90 mg, oral, Daily  enoxaparin, 40 mg, subcutaneous, q24h  gabapentin, 800 mg, oral, TID  ketamine, 0.5 mg/kg, intravenous,  Once  ketorolac, 15 mg, intravenous, q6h  metoprolol succinate XL, 25 mg, oral, Daily  pantoprazole, 40 mg, oral, Daily before breakfast   Or  pantoprazole, 40 mg, intravenous, Daily before breakfast  topiramate, 100 mg, oral, q AM  topiramate, 200 mg, oral, Nightly      Continuous medications  oxygen,       PRN medications  PRN medications: acetaminophen **OR** acetaminophen **OR** acetaminophen, doxepin, HYDROmorphone, ondansetron ODT **OR** ondansetron, oxyCODONE      Assessment/Plan        78 y.o. female presenting with intractable right lower back and leg pain, acute exacerbation of chronic back and leg pain, acute fracture of the distal fibula, acute ankle dislocation, unable to ambulate, unable to care for self, right leg weakness..  Patient with a history of chronic back pain and lumbar radiculopathy on the right with multiple injections, status postlaminectomy.  Patient is followed by pain management.  She has had multiple ED visits and hospitalizations for the leg and back pain     Principal Problem:    Back pain, unspecified back location, unspecified back pain laterality, unspecified chronicity  Active Problems:    Acute back pain, unspecified back location, unspecified back pain laterality    Acute right lumbar radiculopathy    Closed fracture of right ankle    Ankle dislocation, right, initial encounter    Unable to ambulate    Unable to care for self    Right leg weakness    History of migraine headaches       Right bimalleolar acute bilateral ankle fracture  -Status post mechanical fall, reportedly multiple times at her place  -Limited METS due to chronic pain, but has no major cardiopulmonary comorbidities, most recent ECG reviewed, has history of arrhythmia but currently stable, chest x-ray (December 28, 2023) reviewed  -Patient with moderate risk, but medically optimized to proceed with surgery  -s/p ORIF of right ankle   -DVT ppx on discharge- recommend minimum asa 81 BID x 6 weeks     Chronic  back pain: Presenting with excruciating sciatica pain  -History of multiple surgeries and epidural injections  -States following the surgeon for spinal nerve stimulation  -Dilaudid 0.4 mg IV push every 4 hours as needed severe pain  -Percocet 5-325 mg 1 tablet every 6 hours as needed breakthrough pain  -Cyclobenzaprine 10 mg 3 times daily as needed muscle spasm  -Continue duloxetine 60 mg daily  -Gabapentin 800 mg daily  -Pain management consulted  -MRI T and L spine ordered, pending     History of arrhythmia  -On metoprolol 25 mg daily     Depression  -Venlafaxine  mg daily  -Topiramate 100 mg daily, also off label use for pain management     Diet  -Regular     DVT prophylaxis  -Lovenox 40 mg subcu daily     CODE STATUS: Full     Disposition: Presenting with chronic low back pain, need further evaluation and management, discharge pending pain management evaluation and recommendation. MRI T and L spine ordered, pending      Destiny Schilling MD

## 2024-01-19 NOTE — CARE PLAN
Problem: Pain - Adult  Goal: Verbalizes/displays adequate comfort level or baseline comfort level  Outcome: Progressing     Problem: Discharge Planning  Goal: Discharge to home or other facility with appropriate resources  Outcome: Progressing     Problem: Chronic Conditions and Co-morbidities  Goal: Patient's chronic conditions and co-morbidity symptoms are monitored and maintained or improved  Outcome: Progressing     Problem: Skin  Goal: Decreased wound size/increased tissue granulation at next dressing change  Outcome: Progressing  Goal: Participates in plan/prevention/treatment measures  Outcome: Progressing  Goal: Prevent/manage excess moisture  Outcome: Progressing  Flowsheets (Taken 1/19/2024 1302)  Prevent/manage excess moisture:   Cleanse incontinence/protect with barrier cream   Moisturize dry skin  Goal: Prevent/minimize sheer/friction injuries  Outcome: Progressing  Goal: Promote/optimize nutrition  Outcome: Progressing  Goal: Promote skin healing  Outcome: Progressing     Problem: Pain  Goal: Takes deep breaths with improved pain control throughout the shift  Outcome: Progressing  Goal: Turns in bed with improved pain control throughout the shift  Outcome: Progressing  Goal: Walks with improved pain control throughout the shift  Outcome: Progressing  Goal: Performs ADL's with improved pain control throughout shift  Outcome: Progressing  Goal: Participates in PT with improved pain control throughout the shift  Outcome: Progressing  Goal: Free from opioid side effects throughout the shift  Outcome: Progressing  Goal: Free from acute confusion related to pain meds throughout the shift  Outcome: Progressing   The patient's goals for the shift include  pain control, get washed up    The clinical goals for the shift include monitor vitals, control pain, safety

## 2024-01-19 NOTE — NURSING NOTE
Notified provider 84/52 manual BP. Provider states with nurse and patient that a one time dose of 5mg of oxy will be placed since dilaudid cannot be given. MRI called and updated with provider and patient in room.

## 2024-01-20 LAB
ANION GAP SERPL CALC-SCNC: 12 MMOL/L (ref 10–20)
BUN SERPL-MCNC: 15 MG/DL (ref 6–23)
CALCIUM SERPL-MCNC: 8.4 MG/DL (ref 8.6–10.3)
CHLORIDE SERPL-SCNC: 109 MMOL/L (ref 98–107)
CO2 SERPL-SCNC: 25 MMOL/L (ref 21–32)
CREAT SERPL-MCNC: 0.56 MG/DL (ref 0.5–1.05)
EGFRCR SERPLBLD CKD-EPI 2021: >90 ML/MIN/1.73M*2
ERYTHROCYTE [DISTWIDTH] IN BLOOD BY AUTOMATED COUNT: 15.8 % (ref 11.5–14.5)
GLUCOSE SERPL-MCNC: 87 MG/DL (ref 74–99)
HCT VFR BLD AUTO: 35.6 % (ref 36–46)
HGB BLD-MCNC: 10.8 G/DL (ref 12–16)
MCH RBC QN AUTO: 29.3 PG (ref 26–34)
MCHC RBC AUTO-ENTMCNC: 30.3 G/DL (ref 32–36)
MCV RBC AUTO: 97 FL (ref 80–100)
NRBC BLD-RTO: 0 /100 WBCS (ref 0–0)
PLATELET # BLD AUTO: 296 X10*3/UL (ref 150–450)
POTASSIUM SERPL-SCNC: 3.7 MMOL/L (ref 3.5–5.3)
RBC # BLD AUTO: 3.68 X10*6/UL (ref 4–5.2)
SODIUM SERPL-SCNC: 142 MMOL/L (ref 136–145)
WBC # BLD AUTO: 8.2 X10*3/UL (ref 4.4–11.3)

## 2024-01-20 PROCEDURE — 2500000004 HC RX 250 GENERAL PHARMACY W/ HCPCS (ALT 636 FOR OP/ED): Performed by: PHYSICIAN ASSISTANT

## 2024-01-20 PROCEDURE — 80048 BASIC METABOLIC PNL TOTAL CA: CPT | Performed by: INTERNAL MEDICINE

## 2024-01-20 PROCEDURE — 1100000001 HC PRIVATE ROOM DAILY

## 2024-01-20 PROCEDURE — 2500000001 HC RX 250 WO HCPCS SELF ADMINISTERED DRUGS (ALT 637 FOR MEDICARE OP): Performed by: INTERNAL MEDICINE

## 2024-01-20 PROCEDURE — 99233 SBSQ HOSP IP/OBS HIGH 50: CPT | Performed by: INTERNAL MEDICINE

## 2024-01-20 PROCEDURE — 85027 COMPLETE CBC AUTOMATED: CPT | Performed by: INTERNAL MEDICINE

## 2024-01-20 PROCEDURE — 36415 COLL VENOUS BLD VENIPUNCTURE: CPT | Performed by: INTERNAL MEDICINE

## 2024-01-20 PROCEDURE — 2500000001 HC RX 250 WO HCPCS SELF ADMINISTERED DRUGS (ALT 637 FOR MEDICARE OP): Performed by: PHYSICIAN ASSISTANT

## 2024-01-20 PROCEDURE — 2500000001 HC RX 250 WO HCPCS SELF ADMINISTERED DRUGS (ALT 637 FOR MEDICARE OP): Performed by: HOSPITALIST

## 2024-01-20 RX ORDER — OXYCODONE HYDROCHLORIDE 5 MG/1
5 TABLET ORAL EVERY 4 HOURS PRN
Status: DISCONTINUED | OUTPATIENT
Start: 2024-01-20 | End: 2024-01-24 | Stop reason: HOSPADM

## 2024-01-20 RX ADMIN — OXYCODONE HYDROCHLORIDE 5 MG: 5 TABLET ORAL at 18:47

## 2024-01-20 RX ADMIN — DOXEPIN HYDROCHLORIDE 100 MG: 50 CAPSULE ORAL at 21:16

## 2024-01-20 RX ADMIN — HYDROMORPHONE HYDROCHLORIDE 0.5 MG: 1 INJECTION, SOLUTION INTRAMUSCULAR; INTRAVENOUS; SUBCUTANEOUS at 22:32

## 2024-01-20 RX ADMIN — ENOXAPARIN SODIUM 40 MG: 40 INJECTION SUBCUTANEOUS at 20:32

## 2024-01-20 RX ADMIN — GABAPENTIN 800 MG: 400 CAPSULE ORAL at 15:05

## 2024-01-20 RX ADMIN — TOPIRAMATE 100 MG: 25 TABLET, FILM COATED ORAL at 09:11

## 2024-01-20 RX ADMIN — TOPIRAMATE 200 MG: 25 TABLET, FILM COATED ORAL at 20:32

## 2024-01-20 RX ADMIN — DULOXETINE HYDROCHLORIDE 90 MG: 30 CAPSULE, DELAYED RELEASE ORAL at 09:10

## 2024-01-20 RX ADMIN — OXYCODONE HYDROCHLORIDE 5 MG: 5 TABLET ORAL at 12:42

## 2024-01-20 RX ADMIN — DOCUSATE SODIUM 100 MG: 100 CAPSULE, LIQUID FILLED ORAL at 20:32

## 2024-01-20 RX ADMIN — DOCUSATE SODIUM 100 MG: 100 CAPSULE, LIQUID FILLED ORAL at 09:11

## 2024-01-20 RX ADMIN — GABAPENTIN 800 MG: 400 CAPSULE ORAL at 09:11

## 2024-01-20 RX ADMIN — PANTOPRAZOLE SODIUM 40 MG: 40 TABLET, DELAYED RELEASE ORAL at 06:27

## 2024-01-20 RX ADMIN — GABAPENTIN 800 MG: 400 CAPSULE ORAL at 20:32

## 2024-01-20 RX ADMIN — METOPROLOL SUCCINATE 25 MG: 25 TABLET, EXTENDED RELEASE ORAL at 09:11

## 2024-01-20 RX ADMIN — OXYCODONE HYDROCHLORIDE 5 MG: 5 TABLET ORAL at 06:27

## 2024-01-20 RX ADMIN — ACETAMINOPHEN 650 MG: 325 TABLET ORAL at 18:47

## 2024-01-20 ASSESSMENT — PAIN DESCRIPTION - ORIENTATION
ORIENTATION: RIGHT
ORIENTATION: RIGHT

## 2024-01-20 ASSESSMENT — COGNITIVE AND FUNCTIONAL STATUS - GENERAL
MOBILITY SCORE: 13
HELP NEEDED FOR BATHING: A LITTLE
STANDING UP FROM CHAIR USING ARMS: A LOT
DRESSING REGULAR LOWER BODY CLOTHING: A LOT
DRESSING REGULAR UPPER BODY CLOTHING: A LITTLE
HELP NEEDED FOR BATHING: A LITTLE
CLIMB 3 TO 5 STEPS WITH RAILING: TOTAL
DRESSING REGULAR UPPER BODY CLOTHING: A LITTLE
DAILY ACTIVITIY SCORE: 17
MOVING TO AND FROM BED TO CHAIR: A LOT
PERSONAL GROOMING: A LITTLE
TURNING FROM BACK TO SIDE WHILE IN FLAT BAD: A LITTLE
MOVING TO AND FROM BED TO CHAIR: A LOT
DAILY ACTIVITIY SCORE: 17
WALKING IN HOSPITAL ROOM: A LOT
STANDING UP FROM CHAIR USING ARMS: A LOT
TURNING FROM BACK TO SIDE WHILE IN FLAT BAD: A LITTLE
MOVING FROM LYING ON BACK TO SITTING ON SIDE OF FLAT BED WITH BEDRAILS: A LITTLE
PERSONAL GROOMING: A LITTLE
TOILETING: A LOT
DRESSING REGULAR LOWER BODY CLOTHING: A LOT
MOVING FROM LYING ON BACK TO SITTING ON SIDE OF FLAT BED WITH BEDRAILS: A LITTLE
WALKING IN HOSPITAL ROOM: A LOT
MOBILITY SCORE: 13
CLIMB 3 TO 5 STEPS WITH RAILING: TOTAL
TOILETING: A LOT

## 2024-01-20 ASSESSMENT — PAIN - FUNCTIONAL ASSESSMENT
PAIN_FUNCTIONAL_ASSESSMENT: 0-10

## 2024-01-20 ASSESSMENT — PAIN SCALES - GENERAL
PAINLEVEL_OUTOF10: 4
PAINLEVEL_OUTOF10: 8
PAINLEVEL_OUTOF10: 0 - NO PAIN
PAINLEVEL_OUTOF10: 8
PAINLEVEL_OUTOF10: 9
PAINLEVEL_OUTOF10: 6
PAINLEVEL_OUTOF10: 5 - MODERATE PAIN

## 2024-01-20 ASSESSMENT — PAIN DESCRIPTION - LOCATION
LOCATION: ANKLE
LOCATION: ANKLE

## 2024-01-20 NOTE — PROGRESS NOTES
Roby Qureshi is a 78 y.o. female on day 6 of admission presenting with Back pain, unspecified back location, unspecified back pain laterality, unspecified chronicity.      Subjective   Patient was seen and examined at bedside this morning    Objective     Last Recorded Vitals  /62   Pulse 72   Temp 36.5 °C (97.7 °F) (Temporal)   Resp 18   Wt 61.2 kg (134 lb 14.7 oz)   SpO2 98%   Intake/Output last 3 Shifts:    Intake/Output Summary (Last 24 hours) at 1/20/2024 0952  Last data filed at 1/19/2024 1000  Gross per 24 hour   Intake 500 ml   Output 250 ml   Net 250 ml         Admission Weight  Weight: 61.2 kg (135 lb) (01/14/24 1026)    Daily Weight  01/17/24 : 61.2 kg (134 lb 14.7 oz)    Image Results  FL less than 1 hour  These images are not reportable by radiology and will not be interpreted   by  Radiologists.      Physical Exam  Constitutional: Elderly female, alert active, cooperative not in acute distress  Eyes: PERRLA, clear sclera  ENMT: Moist mucosal membranes, no exudate  Head / Neck: Atraumatic, normocephalic, supple neck, JVP not visualized  Lungs: Patent airways, CTABL  Heart: RRR, S1S2, no murmurs appreciated, palpable pulses in all extremities  GI: Soft, NT, ND, bowel sounds present in all quadrants  MSK: Moves all extremities freely with tenderness and limited ROM with rotation, sidebending flexion extension of the lumbar spine, no joint edema  Extremities: Intact x 4, no peripheral edema  : No Crouch catheter inserted  Breast: Deferred  Neurological: AAO x 3 to person, place and date, facial muscles symmetrical, sensation intact, strength 4/4, no acute focal neurological deficits appreciated  Psychological: Appropriate mood and behavior    Relevant Results           Scheduled medications  docusate sodium, 100 mg, oral, BID  DULoxetine, 90 mg, oral, Daily  enoxaparin, 40 mg, subcutaneous, q24h  gabapentin, 800 mg, oral, TID  ketamine, 0.5 mg/kg, intravenous, Once  metoprolol  succinate XL, 25 mg, oral, Daily  pantoprazole, 40 mg, oral, Daily before breakfast   Or  pantoprazole, 40 mg, intravenous, Daily before breakfast  topiramate, 100 mg, oral, q AM  topiramate, 200 mg, oral, Nightly      Continuous medications  oxygen,       PRN medications  PRN medications: acetaminophen **OR** acetaminophen **OR** acetaminophen, doxepin, HYDROmorphone, ondansetron ODT **OR** ondansetron, oxyCODONE      Assessment/Plan        78 y.o. female presenting with intractable right lower back and leg pain, acute exacerbation of chronic back and leg pain, acute fracture of the distal fibula, acute ankle dislocation, unable to ambulate, unable to care for self, right leg weakness..  Patient with a history of chronic back pain and lumbar radiculopathy on the right with multiple injections, status postlaminectomy.  Patient is followed by pain management.  She has had multiple ED visits and hospitalizations for the leg and back pain     Principal Problem:    Back pain, unspecified back location, unspecified back pain laterality, unspecified chronicity  Active Problems:    Acute back pain, unspecified back location, unspecified back pain laterality    Acute right lumbar radiculopathy    Closed fracture of right ankle    Ankle dislocation, right, initial encounter    Unable to ambulate    Unable to care for self    Right leg weakness    History of migraine headaches       Right bimalleolar acute bilateral ankle fracture  -Status post mechanical fall, reportedly multiple times at her place  -Limited METS due to chronic pain, but has no major cardiopulmonary comorbidities, most recent ECG reviewed, has history of arrhythmia but currently stable, chest x-ray (December 28, 2023) reviewed  -Patient with moderate risk, but medically optimized to proceed with surgery  -s/p ORIF of right ankle   -DVT ppx on discharge- recommend minimum asa 81 BID x 6 weeks     Chronic back pain: Presenting with excruciating sciatica  pain  -History of multiple surgeries and epidural injections  -States following the surgeon for spinal nerve stimulation  -Dilaudid 0.4 mg IV push every 4 hours as needed severe pain  -Percocet 5-325 mg 1 tablet every 6 hours as needed breakthrough pain  -Cyclobenzaprine 10 mg 3 times daily as needed muscle spasm  -Continue duloxetine 60 mg daily  -Gabapentin 800 mg daily  -Pain management consulted  -MRI T and L spine ordered, pending     History of arrhythmia  -On metoprolol 25 mg daily     Depression  -Venlafaxine  mg daily  -Topiramate 100 mg daily, also off label use for pain management     Diet  -Regular     DVT prophylaxis  -Lovenox 40 mg subcu daily     CODE STATUS: Full     Disposition: Presenting with chronic low back pain, need further evaluation and management, discharge pending pain management evaluation and recommendation. MRI T and L spine ordered, pending.      Destiny Schilling MD

## 2024-01-20 NOTE — PROGRESS NOTES
"SW met with Pt. Pt states \"my children are trying to throw me away\" \"I don't want to go to a nursing home and sit in a rocking chair\". Explained recommendation is for skilled care/rehab. SW explained skilled nursing care/SNF and long term care. Pt agreeable to SNF. Pt stated she wanted to go to [facility] but was told she couldn't afford it. [facility], [facility] would be [facility]. [facility]. Updates sent to [facility]. Dtr has POA and not guardianship. Psych eval for capacity will not be done as Pt's preference is reasonable. Additional education needed with Pt.   "

## 2024-01-21 LAB
ANION GAP SERPL CALC-SCNC: 11 MMOL/L (ref 10–20)
BUN SERPL-MCNC: 15 MG/DL (ref 6–23)
CALCIUM SERPL-MCNC: 8.6 MG/DL (ref 8.6–10.3)
CHLORIDE SERPL-SCNC: 107 MMOL/L (ref 98–107)
CO2 SERPL-SCNC: 26 MMOL/L (ref 21–32)
CREAT SERPL-MCNC: 0.59 MG/DL (ref 0.5–1.05)
EGFRCR SERPLBLD CKD-EPI 2021: >90 ML/MIN/1.73M*2
ERYTHROCYTE [DISTWIDTH] IN BLOOD BY AUTOMATED COUNT: 15.7 % (ref 11.5–14.5)
GLUCOSE SERPL-MCNC: 76 MG/DL (ref 74–99)
HCT VFR BLD AUTO: 35.8 % (ref 36–46)
HGB BLD-MCNC: 10.9 G/DL (ref 12–16)
MCH RBC QN AUTO: 29.6 PG (ref 26–34)
MCHC RBC AUTO-ENTMCNC: 30.4 G/DL (ref 32–36)
MCV RBC AUTO: 97 FL (ref 80–100)
NRBC BLD-RTO: 0 /100 WBCS (ref 0–0)
PLATELET # BLD AUTO: 306 X10*3/UL (ref 150–450)
POTASSIUM SERPL-SCNC: 4.2 MMOL/L (ref 3.5–5.3)
RBC # BLD AUTO: 3.68 X10*6/UL (ref 4–5.2)
SODIUM SERPL-SCNC: 140 MMOL/L (ref 136–145)
WBC # BLD AUTO: 6.8 X10*3/UL (ref 4.4–11.3)

## 2024-01-21 PROCEDURE — 2500000004 HC RX 250 GENERAL PHARMACY W/ HCPCS (ALT 636 FOR OP/ED): Performed by: PHYSICIAN ASSISTANT

## 2024-01-21 PROCEDURE — 2500000001 HC RX 250 WO HCPCS SELF ADMINISTERED DRUGS (ALT 637 FOR MEDICARE OP): Performed by: PHYSICIAN ASSISTANT

## 2024-01-21 PROCEDURE — 2500000001 HC RX 250 WO HCPCS SELF ADMINISTERED DRUGS (ALT 637 FOR MEDICARE OP): Performed by: INTERNAL MEDICINE

## 2024-01-21 PROCEDURE — 80048 BASIC METABOLIC PNL TOTAL CA: CPT | Performed by: INTERNAL MEDICINE

## 2024-01-21 PROCEDURE — 2500000001 HC RX 250 WO HCPCS SELF ADMINISTERED DRUGS (ALT 637 FOR MEDICARE OP): Performed by: HOSPITALIST

## 2024-01-21 PROCEDURE — 85027 COMPLETE CBC AUTOMATED: CPT | Performed by: INTERNAL MEDICINE

## 2024-01-21 PROCEDURE — 36415 COLL VENOUS BLD VENIPUNCTURE: CPT | Performed by: INTERNAL MEDICINE

## 2024-01-21 PROCEDURE — 99232 SBSQ HOSP IP/OBS MODERATE 35: CPT | Performed by: INTERNAL MEDICINE

## 2024-01-21 PROCEDURE — 1100000001 HC PRIVATE ROOM DAILY

## 2024-01-21 RX ADMIN — GABAPENTIN 800 MG: 400 CAPSULE ORAL at 20:03

## 2024-01-21 RX ADMIN — DULOXETINE HYDROCHLORIDE 90 MG: 30 CAPSULE, DELAYED RELEASE ORAL at 08:33

## 2024-01-21 RX ADMIN — PANTOPRAZOLE SODIUM 40 MG: 40 TABLET, DELAYED RELEASE ORAL at 05:58

## 2024-01-21 RX ADMIN — GABAPENTIN 800 MG: 400 CAPSULE ORAL at 08:33

## 2024-01-21 RX ADMIN — TOPIRAMATE 200 MG: 25 TABLET, FILM COATED ORAL at 20:03

## 2024-01-21 RX ADMIN — ENOXAPARIN SODIUM 40 MG: 40 INJECTION SUBCUTANEOUS at 20:03

## 2024-01-21 RX ADMIN — DOCUSATE SODIUM 100 MG: 100 CAPSULE, LIQUID FILLED ORAL at 20:03

## 2024-01-21 RX ADMIN — DOXEPIN HYDROCHLORIDE 100 MG: 50 CAPSULE ORAL at 21:11

## 2024-01-21 RX ADMIN — METOPROLOL SUCCINATE 25 MG: 25 TABLET, EXTENDED RELEASE ORAL at 08:33

## 2024-01-21 RX ADMIN — GABAPENTIN 800 MG: 400 CAPSULE ORAL at 16:07

## 2024-01-21 RX ADMIN — DOCUSATE SODIUM 100 MG: 100 CAPSULE, LIQUID FILLED ORAL at 08:33

## 2024-01-21 RX ADMIN — OXYCODONE HYDROCHLORIDE 5 MG: 5 TABLET ORAL at 16:12

## 2024-01-21 RX ADMIN — OXYCODONE HYDROCHLORIDE 5 MG: 5 TABLET ORAL at 05:55

## 2024-01-21 RX ADMIN — OXYCODONE HYDROCHLORIDE 5 MG: 5 TABLET ORAL at 20:03

## 2024-01-21 RX ADMIN — TOPIRAMATE 100 MG: 25 TABLET, FILM COATED ORAL at 08:33

## 2024-01-21 ASSESSMENT — COGNITIVE AND FUNCTIONAL STATUS - GENERAL
DRESSING REGULAR UPPER BODY CLOTHING: A LITTLE
MOVING FROM LYING ON BACK TO SITTING ON SIDE OF FLAT BED WITH BEDRAILS: A LITTLE
CLIMB 3 TO 5 STEPS WITH RAILING: TOTAL
TURNING FROM BACK TO SIDE WHILE IN FLAT BAD: A LITTLE
MOBILITY SCORE: 13
DAILY ACTIVITIY SCORE: 17
TOILETING: A LOT
PERSONAL GROOMING: A LITTLE
DRESSING REGULAR LOWER BODY CLOTHING: A LOT
MOBILITY SCORE: 13
CLIMB 3 TO 5 STEPS WITH RAILING: TOTAL
PERSONAL GROOMING: A LITTLE
WALKING IN HOSPITAL ROOM: A LOT
MOVING FROM LYING ON BACK TO SITTING ON SIDE OF FLAT BED WITH BEDRAILS: A LITTLE
TURNING FROM BACK TO SIDE WHILE IN FLAT BAD: A LITTLE
TOILETING: A LOT
HELP NEEDED FOR BATHING: A LITTLE
WALKING IN HOSPITAL ROOM: A LOT
MOVING TO AND FROM BED TO CHAIR: A LOT
DRESSING REGULAR LOWER BODY CLOTHING: A LOT
DRESSING REGULAR UPPER BODY CLOTHING: A LITTLE
MOVING TO AND FROM BED TO CHAIR: A LOT
HELP NEEDED FOR BATHING: A LITTLE
DAILY ACTIVITIY SCORE: 17
STANDING UP FROM CHAIR USING ARMS: A LOT
STANDING UP FROM CHAIR USING ARMS: A LOT

## 2024-01-21 ASSESSMENT — PAIN - FUNCTIONAL ASSESSMENT
PAIN_FUNCTIONAL_ASSESSMENT: 0-10

## 2024-01-21 ASSESSMENT — PAIN SCALES - GENERAL
PAINLEVEL_OUTOF10: 6
PAINLEVEL_OUTOF10: 10 - WORST POSSIBLE PAIN
PAINLEVEL_OUTOF10: 0 - NO PAIN
PAINLEVEL_OUTOF10: 8
PAINLEVEL_OUTOF10: 8

## 2024-01-21 NOTE — PROGRESS NOTES
Roby Qureshi is a 78 y.o. female on day 7 of admission presenting with Back pain, unspecified back location, unspecified back pain laterality, unspecified chronicity.      Subjective   Patient was seen and examined at bedside this morning    Objective     Last Recorded Vitals  /79 (BP Location: Right arm, Patient Position: Lying)   Pulse 75   Temp 36.6 °C (97.8 °F) (Temporal)   Resp 18   Wt 61.2 kg (134 lb 14.7 oz)   SpO2 98%   Intake/Output last 3 Shifts:    Intake/Output Summary (Last 24 hours) at 1/21/2024 1057  Last data filed at 1/21/2024 0558  Gross per 24 hour   Intake 420 ml   Output 750 ml   Net -330 ml         Admission Weight  Weight: 61.2 kg (135 lb) (01/14/24 1026)    Daily Weight  01/17/24 : 61.2 kg (134 lb 14.7 oz)    Image Results  FL less than 1 hour  These images are not reportable by radiology and will not be interpreted   by  Radiologists.      Physical Exam  Constitutional: Elderly female, alert active, cooperative not in acute distress  Eyes: PERRLA, clear sclera  ENMT: Moist mucosal membranes, no exudate  Head / Neck: Atraumatic, normocephalic, supple neck, JVP not visualized  Lungs: Patent airways, CTABL  Heart: RRR, S1S2, no murmurs appreciated, palpable pulses in all extremities  GI: Soft, NT, ND, bowel sounds present in all quadrants  MSK: Moves all extremities freely with tenderness and limited ROM with rotation, sidebending flexion extension of the lumbar spine, no joint edema  Extremities: Intact x 4, no peripheral edema  : No Crouch catheter inserted  Breast: Deferred  Neurological: AAO x 3 to person, place and date, facial muscles symmetrical, sensation intact, strength 4/4, no acute focal neurological deficits appreciated  Psychological: Appropriate mood and behavior    Relevant Results           Scheduled medications  docusate sodium, 100 mg, oral, BID  DULoxetine, 90 mg, oral, Daily  enoxaparin, 40 mg, subcutaneous, q24h  gabapentin, 800 mg, oral,  TID  ketamine, 0.5 mg/kg, intravenous, Once  metoprolol succinate XL, 25 mg, oral, Daily  pantoprazole, 40 mg, oral, Daily before breakfast   Or  pantoprazole, 40 mg, intravenous, Daily before breakfast  topiramate, 100 mg, oral, q AM  topiramate, 200 mg, oral, Nightly      Continuous medications  oxygen,       PRN medications  PRN medications: acetaminophen **OR** acetaminophen **OR** acetaminophen, doxepin, HYDROmorphone, ondansetron ODT **OR** ondansetron, oxyCODONE      Assessment/Plan        78 y.o. female presenting with intractable right lower back and leg pain, acute exacerbation of chronic back and leg pain, acute fracture of the distal fibula, acute ankle dislocation, unable to ambulate, unable to care for self, right leg weakness..  Patient with a history of chronic back pain and lumbar radiculopathy on the right with multiple injections, status postlaminectomy.  Patient is followed by pain management.  She has had multiple ED visits and hospitalizations for the leg and back pain     Principal Problem:    Back pain, unspecified back location, unspecified back pain laterality, unspecified chronicity  Active Problems:    Acute back pain, unspecified back location, unspecified back pain laterality    Acute right lumbar radiculopathy    Closed fracture of right ankle    Ankle dislocation, right, initial encounter    Unable to ambulate    Unable to care for self    Right leg weakness    History of migraine headaches       Right bimalleolar acute bilateral ankle fracture  -Status post mechanical fall, reportedly multiple times at her place  -Limited METS due to chronic pain, but has no major cardiopulmonary comorbidities, most recent ECG reviewed, has history of arrhythmia but currently stable, chest x-ray (December 28, 2023) reviewed  -Patient with moderate risk, but medically optimized to proceed with surgery  -s/p ORIF of right ankle   -DVT ppx on discharge- recommend minimum asa 81 BID x 6 weeks     Chronic  back pain: Presenting with excruciating sciatica pain  -History of multiple surgeries and epidural injections  -States following the surgeon for spinal nerve stimulation  -Dilaudid 0.4 mg IV push every 4 hours as needed severe pain  -Percocet 5-325 mg 1 tablet every 6 hours as needed breakthrough pain  -Cyclobenzaprine 10 mg 3 times daily as needed muscle spasm  -Continue duloxetine 60 mg daily  -Gabapentin 800 mg daily  -Pain management consulted  -MRI T and L spine ordered, pending     History of arrhythmia  -On metoprolol 25 mg daily     Depression  -Venlafaxine  mg daily  -Topiramate 100 mg daily, also off label use for pain management     Diet  -Regular     DVT prophylaxis  -Lovenox 40 mg subcu daily     CODE STATUS: Full     Disposition: Presenting with chronic low back pain, need further evaluation and management, discharge pending pain management evaluation and recommendation. MRI T and L spine ordered, pending.      Destiny Schilling MD

## 2024-01-21 NOTE — CARE PLAN
The patient's goals for the shift include  pt will have decreased pain by end of shift    The clinical goals for the shift include patient will remain safe throughtout the shift    Over the shift, the patient did not make progress toward the following goals. Barriers to progression include . Recommendations to address these barriers include .

## 2024-01-21 NOTE — CARE PLAN
Problem: Pain - Adult  Goal: Verbalizes/displays adequate comfort level or baseline comfort level  Outcome: Progressing   The patient's goals for the shift include utilizing call light appropriately.     The clinical goals for the shift include patient will become more independent

## 2024-01-21 NOTE — PROGRESS NOTES
Roby Qureshi is a 78 y.o. female on day 7 of admission presenting with Back pain, unspecified back location, unspecified back pain laterality, unspecified chronicity.  I met with patient today  Per dr. Schilling, she has been deemed able to make own decisions>  She has auth at [facility] until 1/25  She wants either [facility] or [facility]  She is only pending an MRI, then [facility]  She wants to call her daughter.  I sent a message in [facility] to clarify in network  I advised patient that if she cannot reach daughter by tomorrow AM, SHE needs to pick fac  Bridget Rodrigues RN

## 2024-01-22 LAB
ANION GAP SERPL CALC-SCNC: 14 MMOL/L (ref 10–20)
BUN SERPL-MCNC: 14 MG/DL (ref 6–23)
CALCIUM SERPL-MCNC: 8.7 MG/DL (ref 8.6–10.3)
CHLORIDE SERPL-SCNC: 107 MMOL/L (ref 98–107)
CO2 SERPL-SCNC: 23 MMOL/L (ref 21–32)
CREAT SERPL-MCNC: 0.56 MG/DL (ref 0.5–1.05)
EGFRCR SERPLBLD CKD-EPI 2021: >90 ML/MIN/1.73M*2
ERYTHROCYTE [DISTWIDTH] IN BLOOD BY AUTOMATED COUNT: 15.4 % (ref 11.5–14.5)
GLUCOSE BLD MANUAL STRIP-MCNC: 122 MG/DL (ref 74–99)
GLUCOSE SERPL-MCNC: 91 MG/DL (ref 74–99)
HCT VFR BLD AUTO: 36.4 % (ref 36–46)
HGB BLD-MCNC: 11 G/DL (ref 12–16)
MCH RBC QN AUTO: 29.8 PG (ref 26–34)
MCHC RBC AUTO-ENTMCNC: 30.2 G/DL (ref 32–36)
MCV RBC AUTO: 99 FL (ref 80–100)
NRBC BLD-RTO: 0 /100 WBCS (ref 0–0)
PLATELET # BLD AUTO: 300 X10*3/UL (ref 150–450)
POTASSIUM SERPL-SCNC: 4.1 MMOL/L (ref 3.5–5.3)
RBC # BLD AUTO: 3.69 X10*6/UL (ref 4–5.2)
SODIUM SERPL-SCNC: 140 MMOL/L (ref 136–145)
WBC # BLD AUTO: 7.8 X10*3/UL (ref 4.4–11.3)

## 2024-01-22 PROCEDURE — 80048 BASIC METABOLIC PNL TOTAL CA: CPT | Performed by: INTERNAL MEDICINE

## 2024-01-22 PROCEDURE — 2500000001 HC RX 250 WO HCPCS SELF ADMINISTERED DRUGS (ALT 637 FOR MEDICARE OP): Performed by: PHYSICIAN ASSISTANT

## 2024-01-22 PROCEDURE — 99232 SBSQ HOSP IP/OBS MODERATE 35: CPT | Performed by: INTERNAL MEDICINE

## 2024-01-22 PROCEDURE — 2500000001 HC RX 250 WO HCPCS SELF ADMINISTERED DRUGS (ALT 637 FOR MEDICARE OP): Performed by: INTERNAL MEDICINE

## 2024-01-22 PROCEDURE — 97530 THERAPEUTIC ACTIVITIES: CPT | Mod: GP,CQ | Performed by: PHYSICAL THERAPY ASSISTANT

## 2024-01-22 PROCEDURE — 85027 COMPLETE CBC AUTOMATED: CPT | Performed by: INTERNAL MEDICINE

## 2024-01-22 PROCEDURE — 2500000001 HC RX 250 WO HCPCS SELF ADMINISTERED DRUGS (ALT 637 FOR MEDICARE OP): Performed by: HOSPITALIST

## 2024-01-22 PROCEDURE — 1100000001 HC PRIVATE ROOM DAILY

## 2024-01-22 PROCEDURE — 36415 COLL VENOUS BLD VENIPUNCTURE: CPT | Performed by: INTERNAL MEDICINE

## 2024-01-22 PROCEDURE — 82947 ASSAY GLUCOSE BLOOD QUANT: CPT

## 2024-01-22 PROCEDURE — 97530 THERAPEUTIC ACTIVITIES: CPT | Mod: GO

## 2024-01-22 PROCEDURE — 2500000004 HC RX 250 GENERAL PHARMACY W/ HCPCS (ALT 636 FOR OP/ED): Performed by: PHYSICIAN ASSISTANT

## 2024-01-22 RX ORDER — NAPROXEN SODIUM 220 MG/1
81 TABLET, FILM COATED ORAL 2 TIMES DAILY
Qty: 84 TABLET | Refills: 0 | Status: SHIPPED | OUTPATIENT
Start: 2024-01-22 | End: 2024-03-04

## 2024-01-22 RX ORDER — OXYCODONE AND ACETAMINOPHEN 5; 325 MG/1; MG/1
1 TABLET ORAL EVERY 6 HOURS PRN
Qty: 15 TABLET | Refills: 0 | Status: SHIPPED | OUTPATIENT
Start: 2024-01-22 | End: 2024-01-23 | Stop reason: SDUPTHER

## 2024-01-22 RX ADMIN — PANTOPRAZOLE SODIUM 40 MG: 40 TABLET, DELAYED RELEASE ORAL at 06:50

## 2024-01-22 RX ADMIN — GABAPENTIN 800 MG: 400 CAPSULE ORAL at 09:09

## 2024-01-22 RX ADMIN — TOPIRAMATE 200 MG: 25 TABLET, FILM COATED ORAL at 21:31

## 2024-01-22 RX ADMIN — DOCUSATE SODIUM 100 MG: 100 CAPSULE, LIQUID FILLED ORAL at 21:31

## 2024-01-22 RX ADMIN — OXYCODONE HYDROCHLORIDE 5 MG: 5 TABLET ORAL at 06:53

## 2024-01-22 RX ADMIN — OXYCODONE HYDROCHLORIDE 5 MG: 5 TABLET ORAL at 14:46

## 2024-01-22 RX ADMIN — DULOXETINE HYDROCHLORIDE 90 MG: 30 CAPSULE, DELAYED RELEASE ORAL at 09:10

## 2024-01-22 RX ADMIN — METOPROLOL SUCCINATE 25 MG: 25 TABLET, EXTENDED RELEASE ORAL at 09:10

## 2024-01-22 RX ADMIN — ENOXAPARIN SODIUM 40 MG: 40 INJECTION SUBCUTANEOUS at 21:31

## 2024-01-22 RX ADMIN — DOCUSATE SODIUM 100 MG: 100 CAPSULE, LIQUID FILLED ORAL at 09:10

## 2024-01-22 RX ADMIN — GABAPENTIN 800 MG: 400 CAPSULE ORAL at 21:32

## 2024-01-22 RX ADMIN — GABAPENTIN 800 MG: 400 CAPSULE ORAL at 14:46

## 2024-01-22 RX ADMIN — DOXEPIN HYDROCHLORIDE 100 MG: 50 CAPSULE ORAL at 21:59

## 2024-01-22 RX ADMIN — HYDROMORPHONE HYDROCHLORIDE 0.5 MG: 1 INJECTION, SOLUTION INTRAMUSCULAR; INTRAVENOUS; SUBCUTANEOUS at 21:31

## 2024-01-22 RX ADMIN — TOPIRAMATE 100 MG: 25 TABLET, FILM COATED ORAL at 09:10

## 2024-01-22 ASSESSMENT — COGNITIVE AND FUNCTIONAL STATUS - GENERAL
TURNING FROM BACK TO SIDE WHILE IN FLAT BAD: A LITTLE
DRESSING REGULAR UPPER BODY CLOTHING: A LITTLE
HELP NEEDED FOR BATHING: A LITTLE
MOVING FROM LYING ON BACK TO SITTING ON SIDE OF FLAT BED WITH BEDRAILS: A LITTLE
TURNING FROM BACK TO SIDE WHILE IN FLAT BAD: A LITTLE
DAILY ACTIVITIY SCORE: 17
MOVING FROM LYING ON BACK TO SITTING ON SIDE OF FLAT BED WITH BEDRAILS: A LITTLE
MOVING TO AND FROM BED TO CHAIR: A LITTLE
TOILETING: A LOT
PERSONAL GROOMING: A LITTLE
TOILETING: A LOT
WALKING IN HOSPITAL ROOM: TOTAL
CLIMB 3 TO 5 STEPS WITH RAILING: TOTAL
DAILY ACTIVITIY SCORE: 16
EATING MEALS: A LITTLE
WALKING IN HOSPITAL ROOM: TOTAL
EATING MEALS: A LITTLE
CLIMB 3 TO 5 STEPS WITH RAILING: TOTAL
HELP NEEDED FOR BATHING: A LITTLE
MOVING TO AND FROM BED TO CHAIR: A LITTLE
DRESSING REGULAR LOWER BODY CLOTHING: A LOT
PERSONAL GROOMING: A LITTLE
DAILY ACTIVITIY SCORE: 16
MOVING TO AND FROM BED TO CHAIR: A LITTLE
STANDING UP FROM CHAIR USING ARMS: A LOT
PERSONAL GROOMING: A LITTLE
HELP NEEDED FOR BATHING: A LITTLE
CLIMB 3 TO 5 STEPS WITH RAILING: TOTAL
DRESSING REGULAR LOWER BODY CLOTHING: A LOT
STANDING UP FROM CHAIR USING ARMS: A LOT
WALKING IN HOSPITAL ROOM: TOTAL
DRESSING REGULAR LOWER BODY CLOTHING: A LOT
DRESSING REGULAR UPPER BODY CLOTHING: A LITTLE
MOBILITY SCORE: 13
MOVING FROM LYING ON BACK TO SITTING ON SIDE OF FLAT BED WITH BEDRAILS: A LITTLE
STANDING UP FROM CHAIR USING ARMS: A LOT
MOBILITY SCORE: 13
MOBILITY SCORE: 13
TOILETING: A LOT
TURNING FROM BACK TO SIDE WHILE IN FLAT BAD: A LITTLE
DRESSING REGULAR UPPER BODY CLOTHING: A LITTLE

## 2024-01-22 ASSESSMENT — PAIN - FUNCTIONAL ASSESSMENT
PAIN_FUNCTIONAL_ASSESSMENT: 0-10

## 2024-01-22 ASSESSMENT — PAIN SCALES - GENERAL
PAINLEVEL_OUTOF10: 3
PAINLEVEL_OUTOF10: 7
PAINLEVEL_OUTOF10: 10 - WORST POSSIBLE PAIN
PAINLEVEL_OUTOF10: 3

## 2024-01-22 ASSESSMENT — PAIN DESCRIPTION - LOCATION: LOCATION: LEG

## 2024-01-22 NOTE — PROGRESS NOTES
Roby Qureshi is a 78 y.o. female on day 8 of admission presenting with Back pain, unspecified back location, unspecified back pain laterality, unspecified chronicity.      Subjective   Patient was seen and examined at bedside this morning    Objective     Last Recorded Vitals  BP (!) 81/40   Pulse 66   Temp 37 °C (98.6 °F) (Oral)   Resp 18   Wt 61.2 kg (134 lb 14.7 oz)   SpO2 100%   Intake/Output last 3 Shifts:    Intake/Output Summary (Last 24 hours) at 1/22/2024 1227  Last data filed at 1/22/2024 0600  Gross per 24 hour   Intake 357 ml   Output 401 ml   Net -44 ml         Admission Weight  Weight: 61.2 kg (135 lb) (01/14/24 1026)    Daily Weight  01/17/24 : 61.2 kg (134 lb 14.7 oz)    Image Results  FL less than 1 hour  These images are not reportable by radiology and will not be interpreted   by  Radiologists.      Physical Exam  Constitutional: Elderly female, alert active, cooperative not in acute distress  Eyes: PERRLA, clear sclera  ENMT: Moist mucosal membranes, no exudate  Head / Neck: Atraumatic, normocephalic, supple neck, JVP not visualized  Lungs: Patent airways, CTABL  Heart: RRR, S1S2, no murmurs appreciated, palpable pulses in all extremities  GI: Soft, NT, ND, bowel sounds present in all quadrants  MSK: Moves all extremities freely with tenderness and limited ROM with rotation, sidebending flexion extension of the lumbar spine, no joint edema  Extremities: Intact x 4, no peripheral edema  : No Crouch catheter inserted  Breast: Deferred  Neurological: AAO x 3 to person, place and date, facial muscles symmetrical, sensation intact, strength 4/4, no acute focal neurological deficits appreciated  Psychological: Appropriate mood and behavior    Relevant Results           Scheduled medications  docusate sodium, 100 mg, oral, BID  DULoxetine, 90 mg, oral, Daily  enoxaparin, 40 mg, subcutaneous, q24h  gabapentin, 800 mg, oral, TID  ketamine, 0.5 mg/kg, intravenous, Once  metoprolol  succinate XL, 25 mg, oral, Daily  pantoprazole, 40 mg, oral, Daily before breakfast   Or  pantoprazole, 40 mg, intravenous, Daily before breakfast  topiramate, 100 mg, oral, q AM  topiramate, 200 mg, oral, Nightly      Continuous medications  oxygen,       PRN medications  PRN medications: acetaminophen **OR** acetaminophen **OR** acetaminophen, doxepin, HYDROmorphone, ondansetron ODT **OR** ondansetron, oxyCODONE      Assessment/Plan        78 y.o. female presenting with intractable right lower back and leg pain, acute exacerbation of chronic back and leg pain, acute fracture of the distal fibula, acute ankle dislocation, unable to ambulate, unable to care for self, right leg weakness..  Patient with a history of chronic back pain and lumbar radiculopathy on the right with multiple injections, status postlaminectomy.  Patient is followed by pain management.  She has had multiple ED visits and hospitalizations for the leg and back pain     Principal Problem:    Back pain, unspecified back location, unspecified back pain laterality, unspecified chronicity  Active Problems:    Acute back pain, unspecified back location, unspecified back pain laterality    Acute right lumbar radiculopathy    Closed fracture of right ankle    Ankle dislocation, right, initial encounter    Unable to ambulate    Unable to care for self    Right leg weakness    History of migraine headaches       Right bimalleolar acute bilateral ankle fracture  -Status post mechanical fall, reportedly multiple times at her place  -Limited METS due to chronic pain, but has no major cardiopulmonary comorbidities, most recent ECG reviewed, has history of arrhythmia but currently stable, chest x-ray (December 28, 2023) reviewed  -Patient with moderate risk, but medically optimized to proceed with surgery  -s/p ORIF of right ankle   -DVT ppx on discharge- recommend minimum asa 81 BID x 6 weeks     Chronic back pain: Presenting with excruciating sciatica  pain  -History of multiple surgeries and epidural injections  -States following the surgeon for spinal nerve stimulation  -Dilaudid 0.4 mg IV push every 4 hours as needed severe pain  -Percocet 5-325 mg 1 tablet every 6 hours as needed breakthrough pain  -Cyclobenzaprine 10 mg 3 times daily as needed muscle spasm  -Continue duloxetine 60 mg daily  -Gabapentin 800 mg daily  -Pain management consulted  -MRI T and L spine ordered, pending     History of arrhythmia  -On metoprolol 25 mg daily     Depression  -Venlafaxine  mg daily  -Topiramate 100 mg daily, also off label use for pain management     Diet  -Regular     DVT prophylaxis  -Lovenox 40 mg subcu daily     CODE STATUS: Full     Disposition: Pending SNF placement. Plan for outpatient MRI T and L-spine once discharged.      Destiny Schilling MD

## 2024-01-22 NOTE — PROGRESS NOTES
Physical Therapy    Physical Therapy Treatment    Patient Name: Roby Qureshi  MRN: [id number]  Today's Date: 1/22/2024  Time Calculation  Start Time: 1007  Stop Time: 1034  Time Calculation (min): 27 min       Assessment/Plan   PT Assessment  End of Session Communication: Bedside nurse  End of Session Patient Position: Bed, 3 rail up, Alarm on  PT Plan  Inpatient/Swing Bed or Outpatient: Inpatient  PT Plan  Treatment/Interventions: Bed mobility, Transfer training  PT Plan: Skilled PT  PT Frequency: 4 times per week  PT Discharge Recommendations: Moderate intensity level of continued care  Equipment Recommended upon Discharge: Wheeled walker  PT Recommended Transfer Status: Assist x1, Assistive device (mod assist to maintain wieght bearing precautions)  PT - OK to Discharge: Yes (per PT POC)      General Visit Information:   PT  Visit  PT Received On: 01/22/24  General  Reason for Referral: 79 y/o F presenting with intractable right back and leg pain as well as fx of the distal fibula with dislocation.  Co-Treatment: OT  Co-Treatment Reason: to maximize pt safety, transfer ability, and participation.  Prior to Session Communication: Bedside nurse  Patient Position Received: Bed, 3 rail up  Preferred Learning Style: kinesthetic, verbal, visual  General Comment:  (pt agreeable to tx with increased encouragement)    Subjective   Precautions:  Precautions  LE Weight Bearing Status: Right Non-Weight Bearing  Vital Signs:       Objective   Pain:  Pain Assessment  Pain Assessment: 0-10  Pain Score: 3  Cognition:     Postural Control:     Extremity/Trunk Assessments:    Activity Tolerance:  Activity Tolerance  Endurance: Decreased tolerance for upright activites  Treatments:       Bed Mobility  Bed Mobility: Yes  Bed Mobility 1  Bed Mobility 1: Supine to sitting, Sitting to supine  Level of Assistance 1: Contact guard  Bed Mobility Comments 1:  (pt req multiple cues for proper hand placement and sequencing to  facilitate transfer and keep precautions)    Ambulation/Gait Training  Ambulation/Gait Training Performed: No  Transfers  Transfer: Yes  Transfer 1  Transfer From 1: Sit to, Stand to  Transfer to 1: Chair with arms  Technique 1: Sit to stand, Stand to sit  Transfer Device 1: Walker, Gait belt  Transfer Level of Assistance 1: Moderate assistance, Moderate verbal cues, +2, Moderate tactile cues  Trials/Comments 1: 3 (pt req increased cues and assistance to keep NWB on RLE, fwd flex posture,fair balance,fair endurance.)  Transfers 2  Transfer From 2: Bed to, Chair with drop arm to  Technique 2: Sit pivot  Transfer Device 2: Gait belt  Transfer Level of Assistance 2: Minimum assistance, +2  Trials/Comments 2:  (pt req multiple cues for proper hand placement and sequencing to facilitate transfer and keep precautions)    Outcome Measures:  [facility] Basic Mobility  Turning from your back to your side while in a flat bed without using bedrails: A little  Moving from lying on your back to sitting on the side of a flat bed without using bedrails: A little  Moving to and from bed to chair (including a wheelchair): A little  Standing up from a chair using your arms (e.g. wheelchair or bedside chair): A lot  To walk in hospital room: Total  Climbing 3-5 steps with railing: Total  Basic Mobility - Total Score: 13    Education Documentation  Precautions, taught by Bradley Peterson PTA at 1/22/2024 11:13 AM.  Learner: Patient  Readiness: Acceptance  Method: Explanation  Response: Needs Reinforcement    Body Mechanics, taught by Bradley Peterson PTA at 1/22/2024 11:13 AM.  Learner: Patient  Readiness: Acceptance  Method: Explanation  Response: Needs Reinforcement    Mobility Training, taught by Bradley Peterson PTA at 1/22/2024 11:13 AM.  Learner: Patient  Readiness: Acceptance  Method: Explanation  Response: Needs Reinforcement    Education Comments  No comments found.        OP EDUCATION:       Encounter Problems        Encounter Problems (Active)       Balance       Goal 1 (Progressing)       Start:  01/16/24    Expected End:  01/30/24       Pt performs all sitting balance with close supervision and standing balance with CGA and LRAD            Mobility       STG - Patient will ambulate (Progressing)       Start:  01/16/24    Expected End:  01/30/24       15 ft with CGA and LRAD while adhering to relevant weight-bearing precautions            PT Problem       PT Goal 1 (Progressing)       Start:  01/16/24    Expected End:  01/30/24       Pt demonstrates IND in performing 2 sets of 15 reps of prescribed BLE            Pain - Adult          Transfers       STG - Patient to transfer to and from sit to supine (Progressing)       Start:  01/16/24    Expected End:  01/30/24       With close supervision         STG - Patient will transfer sit to and from stand (Progressing)       Start:  01/16/24    Expected End:  01/30/24       With min assist x 1 and LRAD

## 2024-01-22 NOTE — CARE PLAN
The patient's goals for the shift include  pt will be free from injury throughout the shift    The clinical goals for the shift include patient will become more independent    Over the shift, the patient did not make progress toward the following goals. Barriers to progression include . Recommendations to address these barriers include .

## 2024-01-22 NOTE — PROGRESS NOTES
Occupational Therapy    OT Treatment    Patient Name: Roby Qureshi  MRN: [id number]  Today's Date: 1/22/2024  Time Calculation  Start Time: 1006  Stop Time: 1033  Time Calculation (min): 27 min         Assessment:  Prognosis: Fair  Barriers to Discharge: Decreased caregiver support  Evaluation/Treatment Tolerance: Treatment limited secondary to agitation, Patient limited by pain, Patient limited by fatigue  Medical Staff Made Aware: Yes  End of Session Communication: Bedside nurse  End of Session Patient Position: Bed, 3 rail up, Alarm on  OT Assessment Results: Decreased ADL status, Decreased upper extremity strength, Decreased endurance, Decreased functional mobility, Decreased IADLs, Decreased trunk control for functional activities  Prognosis: Fair  Barriers to Discharge: Decreased caregiver support  Evaluation/Treatment Tolerance: Treatment limited secondary to agitation, Patient limited by pain, Patient limited by fatigue  Medical Staff Made Aware: Yes  Strengths: Ability to acquire knowledge, Insight into problems  Barriers to Participation: Insight into problems  Plan:  Treatment Interventions: ADL retraining, Functional transfer training, Endurance training, Cognitive reorientation  OT Frequency: 3 times per week  OT Discharge Recommendations: Moderate intensity level of continued care  Equipment Recommended upon Discharge: Wheeled walker  OT Recommended Transfer Status: Moderate assist  OT - OK to Discharge: Yes  Treatment Interventions: ADL retraining, Functional transfer training, Endurance training, Cognitive reorientation    Subjective   Previous Visit Info:  OT Last Visit  OT Received On: 01/22/24  General:  General  Reason for Referral: 79 y/o F presenting with intractable right back and leg pain as well as fx of the distal fibula with dislocation.  Referred By: Rachel Garcia PA-C  Past Medical History Relevant to Rehab: Lumbar radiculopathy, chronic back pain, laminectomy,  SVT  Family/Caregiver Present: No  Co-Treatment: PT  Co-Treatment Reason: To maximize pt. participation  Prior to Session Communication: Bedside nurse  Patient Position Received: Bed, 3 rail up, Alarm on  Preferred Learning Style: kinesthetic, verbal, visual  General Comment: Pt. received supine in bed, purewick, hep blocked  Precautions:  Hearing/Visual Limitations: WFL  LE Weight Bearing Status: Right Non-Weight Bearing  Medical Precautions: Fall precautions  Splinting: R LE splinted  Vital Signs:     Pain:  Pain Assessment  Pain Assessment: 0-10  Pain Score: 3  Pain Type: Surgical pain  Pain Location: Foot  Pain Orientation: Right  Patient's Stated Pain Goal: No pain  Pain Interventions: Medication (See MAR)    Objective    Cognition:  Cognition  Orientation Level: Oriented X4  Safety/Judgement: Exceptions to WFL  Insight: Moderate  Impulsive: Moderately  Flexibility of Thought: No flexibility  Planning: Reduced planning skills    Bed Mobility/Transfers: Bed Mobility  Bed Mobility: Yes  Bed Mobility 1  Bed Mobility 1: Supine to sitting  Level of Assistance 1: Contact guard  Bed Mobility Comments 1: Pt. required max verbal cues for appropriate hand placement  Bed Mobility 2  Bed Mobility  2: Sitting to supine  Level of Assistance 2: Contact guard  Bed Mobility 3  Bed Mobility 3: Scooting  Level of Assistance 3: Contact guard    Transfers  Transfer: Yes  Transfer 1  Technique 1: Sit to stand  Transfer Device 1: Walker, Gait belt  Transfer Level of Assistance 1: Moderate assistance, +2  Trials/Comments 1: 3x trial  Transfers 2  Transfer From 2: Bed to, Chair with drop arm to  Technique 2: Sit pivot  Transfer Device 2: Gait belt  Transfer Level of Assistance 2: Minimum assistance, +2  Trials/Comments 2: Pt. required max verbal cues for appropriate hand placement. Max verbal cues required for R LE NWB    Outcome Measures:[facility] Daily Activity  Putting on and taking off regular lower body clothing: A lot  Bathing  (including washing, rinsing, drying): A little  Putting on and taking off regular upper body clothing: A little  Toileting, which includes using toilet, bedpan or urinal: A lot  Taking care of personal grooming such as brushing teeth: A little  Eating Meals: A little  Daily Activity - Total Score: 16        Education Documentation  Body Mechanics, taught by Christiane Wise OT at 1/22/2024  2:03 PM.  Learner: Patient  Readiness: Acceptance  Method: Explanation, Demonstration  Response: Verbalizes Understanding, Demonstrated Understanding, Needs Reinforcement    Precautions, taught by Christiane Wise OT at 1/22/2024  2:03 PM.  Learner: Patient  Readiness: Acceptance  Method: Explanation, Demonstration  Response: Verbalizes Understanding, Demonstrated Understanding, Needs Reinforcement    Handouts, taught by Christiane Wise OT at 1/22/2024  2:03 PM.  Learner: Patient  Readiness: Acceptance  Method: Explanation, Demonstration  Response: Verbalizes Understanding, Demonstrated Understanding, Needs Reinforcement    ADL Training, taught by Christiane Wise OT at 1/22/2024  2:03 PM.  Learner: Patient  Readiness: Acceptance  Method: Explanation, Demonstration  Response: Verbalizes Understanding, Demonstrated Understanding, Needs Reinforcement    Education Comments  No comments found.        OP EDUCATION:       Goals:  Encounter Problems       Encounter Problems (Active)       ADLs       Patient will perform upper and lower body bathing with minimal assist  level of assistance and grab bars and shower chair. (Progressing)       Start:  01/16/24    Expected End:  01/30/24            Patient with complete lower body dressing with minimal assist  level of assistance donning and doffing all LE clothes  with PRN adaptive equipment while edge of bed  (Not Progressing)       Start:  01/16/24    Expected End:  01/30/24            Patient will complete daily grooming tasks with stand by assist level of assistance and PRN adaptive equipment while  standing. (Not Progressing)       Start:  01/16/24    Expected End:  01/30/24            Patient will complete toileting including hygiene clothing management/hygiene with minimal assist  level of assistance and raised toilet seat and grab bars. (Progressing)       Start:  01/16/24    Expected End:  01/30/24               BALANCE       Patient will tolerate standing for >=3 minutes to stand by assist level of assistance with least restrictive device in order to improve functional activity tolerance for ADL tasks. (Not Progressing)       Start:  01/16/24    Expected End:  01/30/24               COGNITION/SAFETY       Patient will recall and adhere to weight bearing restrictions with all ADL and functional mobility in order to promote healing and safety with functional tasks (Not Progressing)       Start:  01/16/24    Expected End:  01/30/24               MOBILITY       Patient will perform Functional mobility x Household distances/Community Distances with stand by assist level of assistance and least restrictive device in order to improve safety and functional mobility. (Not Progressing)       Start:  01/16/24    Expected End:  01/30/24               TRANSFERS       Patient will complete functional transfer to [facility] with least restrictive device with stand by assist level of assistance. (Not Progressing)       Start:  01/16/24    Expected End:  01/30/24            Patient will complete sit to stand transfer with stand by assist level of assistance and least restrictive device in order to improve safety and prepare for out of bed mobility. (Not Progressing)       Start:  01/16/24    Expected End:  01/30/24

## 2024-01-22 NOTE — PROGRESS NOTES
"Transitional Care Coordination Progress Note:  Plan per Medical/Surgical team: treatment of fall, back pain, right ankle fracture S/P ORIF with oxy, Dialudid  Status: inpatient  Payor source: [employer]  Discharge disposition: from [facility], NEW SNF: [facility], insurance auth approved through 1/25/2024, changed plan to [facility], need new precert  [facility] stated out of network  [facility] was discussing long term care, not clear if they accept [facility] for SNF  Potential Barriers: PSYCH EVAL for capacity, MRI of spine pending  Daughter concerned for self care deficit, poor decision making   ADOD: 1/24/2024  KAILA Morse RN, BSN Transitional Care Coordinator ED# [phone]     @ 0971 Clarified per [facility]: \"We take [facility] for SNF at [facility], but not [facility]. Before starting precert, we need financially approval for her to admit as the plan per [facility] is for her to remain here LTC. Just need to make sure that if she needs assistance with Medicaid, family understands everything that is needed. Our finance team indicated that they reached out to the daughter on Thursday and she has not responded.\"    @ 0945 Called & left message for Daughter Bridget [phone] to clarify plan of care & destination. Awaiting return call.     @ 1047 Clarified with patient that her plan is to go to [facility] SNF for short term rehab & hopefully return to her dog & apartment @ [facility]. Informed [facility] to cancel insurance precert & told [facility] to start insurance precert.     @ 1100 Spoke with daughter Bridget, updated on plan of care. Encouraged daughter to touch base with [facility].    "

## 2024-01-23 ENCOUNTER — PHARMACY VISIT (OUTPATIENT)
Dept: PHARMACY | Facility: CLINIC | Age: 79
End: 2024-01-23
Payer: MEDICARE

## 2024-01-23 VITALS
OXYGEN SATURATION: 98 % | HEART RATE: 62 BPM | HEIGHT: 60 IN | WEIGHT: 134.92 LBS | RESPIRATION RATE: 18 BRPM | DIASTOLIC BLOOD PRESSURE: 49 MMHG | BODY MASS INDEX: 26.49 KG/M2 | TEMPERATURE: 97 F | SYSTOLIC BLOOD PRESSURE: 95 MMHG

## 2024-01-23 PROCEDURE — RXMED WILLOW AMBULATORY MEDICATION CHARGE

## 2024-01-23 PROCEDURE — 2500000001 HC RX 250 WO HCPCS SELF ADMINISTERED DRUGS (ALT 637 FOR MEDICARE OP): Performed by: INTERNAL MEDICINE

## 2024-01-23 PROCEDURE — 2500000001 HC RX 250 WO HCPCS SELF ADMINISTERED DRUGS (ALT 637 FOR MEDICARE OP): Performed by: PHYSICIAN ASSISTANT

## 2024-01-23 PROCEDURE — 99239 HOSP IP/OBS DSCHRG MGMT >30: CPT | Performed by: INTERNAL MEDICINE

## 2024-01-23 PROCEDURE — 2500000004 HC RX 250 GENERAL PHARMACY W/ HCPCS (ALT 636 FOR OP/ED): Performed by: PHYSICIAN ASSISTANT

## 2024-01-23 PROCEDURE — 1100000001 HC PRIVATE ROOM DAILY

## 2024-01-23 RX ORDER — OXYCODONE AND ACETAMINOPHEN 5; 325 MG/1; MG/1
1 TABLET ORAL EVERY 4 HOURS PRN
Status: DISCONTINUED | OUTPATIENT
Start: 2024-01-23 | End: 2024-01-24 | Stop reason: HOSPADM

## 2024-01-23 RX ORDER — OXYCODONE AND ACETAMINOPHEN 5; 325 MG/1; MG/1
1 TABLET ORAL EVERY 4 HOURS PRN
Qty: 15 TABLET | Refills: 0 | Status: SHIPPED | OUTPATIENT
Start: 2024-01-23

## 2024-01-23 RX ADMIN — DULOXETINE HYDROCHLORIDE 90 MG: 30 CAPSULE, DELAYED RELEASE ORAL at 09:52

## 2024-01-23 RX ADMIN — OXYCODONE HYDROCHLORIDE AND ACETAMINOPHEN 1 TABLET: 5; 325 TABLET ORAL at 18:44

## 2024-01-23 RX ADMIN — GABAPENTIN 800 MG: 400 CAPSULE ORAL at 14:22

## 2024-01-23 RX ADMIN — METOPROLOL SUCCINATE 25 MG: 25 TABLET, EXTENDED RELEASE ORAL at 09:52

## 2024-01-23 RX ADMIN — DOCUSATE SODIUM 100 MG: 100 CAPSULE, LIQUID FILLED ORAL at 09:52

## 2024-01-23 RX ADMIN — OXYCODONE HYDROCHLORIDE AND ACETAMINOPHEN 1 TABLET: 5; 325 TABLET ORAL at 14:23

## 2024-01-23 RX ADMIN — GABAPENTIN 800 MG: 400 CAPSULE ORAL at 09:52

## 2024-01-23 RX ADMIN — TOPIRAMATE 100 MG: 25 TABLET, FILM COATED ORAL at 09:53

## 2024-01-23 RX ADMIN — PANTOPRAZOLE SODIUM 40 MG: 40 TABLET, DELAYED RELEASE ORAL at 05:44

## 2024-01-23 RX ADMIN — OXYCODONE HYDROCHLORIDE 5 MG: 5 TABLET ORAL at 09:53

## 2024-01-23 ASSESSMENT — PAIN SCALES - GENERAL
PAINLEVEL_OUTOF10: 10 - WORST POSSIBLE PAIN
PAINLEVEL_OUTOF10: 10 - WORST POSSIBLE PAIN
PAINLEVEL_OUTOF10: 6
PAINLEVEL_OUTOF10: 7
PAINLEVEL_OUTOF10: 6
PAINLEVEL_OUTOF10: 10 - WORST POSSIBLE PAIN

## 2024-01-23 ASSESSMENT — COGNITIVE AND FUNCTIONAL STATUS - GENERAL
WALKING IN HOSPITAL ROOM: TOTAL
CLIMB 3 TO 5 STEPS WITH RAILING: TOTAL
MOBILITY SCORE: 15
DAILY ACTIVITIY SCORE: 16
EATING MEALS: A LITTLE
STANDING UP FROM CHAIR USING ARMS: A LOT
PERSONAL GROOMING: A LITTLE
MOVING TO AND FROM BED TO CHAIR: A LITTLE
DRESSING REGULAR LOWER BODY CLOTHING: A LOT
TOILETING: A LOT
HELP NEEDED FOR BATHING: A LITTLE
DRESSING REGULAR UPPER BODY CLOTHING: A LITTLE

## 2024-01-23 ASSESSMENT — PAIN DESCRIPTION - LOCATION
LOCATION: ANKLE

## 2024-01-23 ASSESSMENT — PAIN - FUNCTIONAL ASSESSMENT
PAIN_FUNCTIONAL_ASSESSMENT: 0-10

## 2024-01-23 ASSESSMENT — PAIN DESCRIPTION - ORIENTATION
ORIENTATION: RIGHT

## 2024-01-23 ASSESSMENT — PAIN DESCRIPTION - DESCRIPTORS
DESCRIPTORS: ACHING

## 2024-01-23 NOTE — DISCHARGE SUMMARY
Discharge Diagnosis  Back pain, unspecified back location, unspecified back pain laterality, unspecified chronicity    Issues Requiring Follow-Up  Follow up with pain management and MRI appointment    Discharge Meds     Your medication list        START taking these medications        Instructions Last Dose Given Next Dose Due   aspirin 81 mg chewable tablet      Chew 1 tablet (81 mg) 2 times a day.              CHANGE how you take these medications        Instructions Last Dose Given Next Dose Due   oxyCODONE-acetaminophen 5-325 mg tablet  Commonly known as: Percocet  What changed: when to take this      Take 1 tablet by mouth every 4 hours if needed for moderate pain (4 - 6) or severe pain (7 - 10).       topiramate 100 mg tablet  Commonly known as: Topamax  What changed:   See the new instructions.  Another medication with the same name was removed. Continue taking this medication, and follow the directions you see here.      TAKE 1 TABLET BY MOUTH EVERY MORNING AND 2 TABLETS AT NIGHT              CONTINUE taking these medications        Instructions Last Dose Given Next Dose Due   doxepin 100 mg capsule  Commonly known as: SINEquan           DULoxetine 60 mg DR capsule  Commonly known as: Cymbalta           DULoxetine 30 mg DR capsule  Commonly known as: Cymbalta           gabapentin 800 mg tablet  Commonly known as: Neurontin      TAKE 1 TABLET BY MOUTH THREE TIMES A DAY       metoprolol succinate XL 25 mg 24 hr tablet  Commonly known as: Toprol-XL           PROBIOTIC ORAL                  STOP taking these medications      CitruceL 500 mg tablet  Generic drug: methylcellulose (laxative)        ibuprofen 400 mg tablet        methylPREDNISolone 4 mg tablets  Commonly known as: Medrol Dospak                  Where to Get Your Medications        These medications were sent to [facility]  [address], [address], [place] [id number]      Hours: 8 AM to 6 PM Mon-Fri, 9 AM to 1 PM Saturday Phone:  "[phone]   aspirin 81 mg chewable tablet  oxyCODONE-acetaminophen 5-325 mg tablet         Test Results Pending At Discharge  Pending Labs       No current pending labs.            Hospital Course  Roby Qureshi is a 78 y.o. female presenting with intractable right lower back and leg pain, acute exacerbation of chronic back and leg pain, acute fracture of the distal fibula, acute ankle dislocation, unable to ambulate, unable to care for self, right leg weakness..  Patient with a history of chronic back pain and lumbar radiculopathy on the right with multiple injections, status postlaminectomy.  Patient is followed by pain management.  She has had multiple ED visits and hospitalizations for the leg and back pain.  Patient has had multiple CTs.  She also has a history of SVT.  Patient presented to the emergency department today by EMS.  Patient is a poor historian especially in terms of specifics of dates and times and duration of pain etc.  Apparently she has been having acute exacerbation of her right lower back and right leg pain for about 5 weeks.  Patient states it has been constant for the last couple of weeks and a 10 out of 10.  Patient states for the last few days she has been unable to get out of bed and has been laying in her own waste.  Patient states she has had 2 or 3 falls in the last few days and at some point injured her right foot and ankle but she was not aware of the swelling and ecchymosis until today.  Patient states she has not been eating or drinking because she has not been able to get out of bed for the last couple of days.  She has intermittent chronic diarrhea but no other complaints at this time.  Should be noted that the patient's neighbor called and spoke with the ED  who said the patient is living in \"deplorable living conditions.\"  The neighbor feels the patient cannot live alone anymore.     In the emergency department the patient was given Dilaudid and Zofran for " pain and a liter of normal saline IV.  She has a urinalysis ordered and pending.  Per the ED attending note this patient was going to have an MRI of the spine.  MRI needs to be done with metal suppression which they apparently can not do here, so the orders for MR were canceled. The   ED also ordered x-rays of the  right tib-fib ankle and foot secondary to ecchymosis and swelling.  Those results are pending.  At this point I reviewed them briefly and the patient does appear to have a distal fibular fracture and likely right ankle dislocation.  I spoke with Dr. Chaves and advised him of the x-ray results.  He is going to order an orthopedic consult from the ED.    Patient admitted for further management, consultation of orthopedics, she underwent open reduction and internal fixation of R ankle fracture.  Pain was managed with IV pain medication, then transition to oral with reintroduction of further Percocet dose which she takes at home, with greater frequency increase to every 4 hours as needed for severe pain.  Patient was seen by PT OT, and recommended moderate intensity continue care.  She was accepted to SNF at [facility], discharged in stable condition with follow-up with pain management.  Patient was unable to have her MRI of the lumbar spine done while inpatient and would follow-up as outpatient.  Patient to resume her home medication, and follow-up with her PCP postdischarge from rehab.    Pertinent Physical Exam At Time of Discharge  Physical Exam  Constitutional: Elderly female, alert active, cooperative not in acute distress  Eyes: PERRLA, clear sclera  ENMT: Moist mucosal membranes, no exudate  Head / Neck: Atraumatic, normocephalic, supple neck, JVP not visualized  Lungs: Patent airways, CTABL  Heart: RRR, S1S2, no murmurs appreciated, palpable pulses in all extremities  GI: Soft, NT, ND, bowel sounds present in all quadrants  MSK: Moves all extremities freely with tenderness and limited ROM  with rotation, sidebending flexion extension of the lumbar spine, no joint edema  Extremities: R foot in protective cast, no peripheral edema  : No Crouch catheter inserted  Breast: Deferred  Neurological: AAO x 3 to person, place and date, facial muscles symmetrical, sensation intact, strength 4/4, no acute focal neurological deficits appreciated  Psychological: Appropriate mood and behavior  Outpatient Follow-Up  No future appointments.      Bandar Soliman DO

## 2024-01-23 NOTE — CARE PLAN
The patient's goals for the shift include      The clinical goals for the shift include Pt will remain free of injury throughout the shift

## 2024-01-23 NOTE — CARE PLAN
The patient's goals for the shift include      The clinical goals for the shift include pt will be safe during shift    Over the shift, the patient did make progress toward the following goals.

## 2024-01-23 NOTE — PROGRESS NOTES
Roby Qureshi is a 78 y.o. female on day 9 of admission presenting with Back pain, unspecified back location, unspecified back pain laterality, unspecified chronicity.    Plan: AUTH started 1/22 by [facility] and currently pending. Patient medically stable at this time. Will DC to [facility] when AUTH approved.    Disposition: [facility]/AUTH started 1/22  Barrier: AUTH  ADOD:  today/tomorrow    1020am   AUTH approved for patient to DC to [facility] at this time. Asked [facility] to arrange transport for patient and RN given number to call report to facility. Family/patient/Care Team/Facility updated on DC.    Mirian Littlejohn RN

## 2024-01-25 ENCOUNTER — NURSING HOME VISIT (OUTPATIENT)
Dept: POST ACUTE CARE | Facility: EXTERNAL LOCATION | Age: 79
End: 2024-01-25
Payer: COMMERCIAL

## 2024-01-25 DIAGNOSIS — M54.41 CHRONIC RIGHT-SIDED LOW BACK PAIN WITH RIGHT-SIDED SCIATICA: ICD-10-CM

## 2024-01-25 DIAGNOSIS — G89.29 CHRONIC RIGHT-SIDED LOW BACK PAIN WITH RIGHT-SIDED SCIATICA: ICD-10-CM

## 2024-01-25 DIAGNOSIS — S82.891D CLOSED FRACTURE OF RIGHT ANKLE WITH ROUTINE HEALING, SUBSEQUENT ENCOUNTER: ICD-10-CM

## 2024-01-25 DIAGNOSIS — M54.16 ACUTE RIGHT LUMBAR RADICULOPATHY: ICD-10-CM

## 2024-01-25 DIAGNOSIS — Z86.79 HX OF CARDIAC ARRHYTHMIA: ICD-10-CM

## 2024-01-25 DIAGNOSIS — R53.81 PHYSICAL DECONDITIONING: ICD-10-CM

## 2024-01-25 DIAGNOSIS — G43.909 MIGRAINE WITHOUT STATUS MIGRAINOSUS, NOT INTRACTABLE, UNSPECIFIED MIGRAINE TYPE: ICD-10-CM

## 2024-01-25 DIAGNOSIS — M54.9 ACUTE BACK PAIN, UNSPECIFIED BACK LOCATION, UNSPECIFIED BACK PAIN LATERALITY: Primary | ICD-10-CM

## 2024-01-25 PROCEDURE — 99310 SBSQ NF CARE HIGH MDM 45: CPT | Performed by: FAMILY MEDICINE
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
## 2024-01-30 PROBLEM — G43.909 MIGRAINE: Status: ACTIVE | Noted: 2024-01-30

## 2024-01-30 PROBLEM — Z86.79 HX OF CARDIAC ARRHYTHMIA: Status: ACTIVE | Noted: 2024-01-30

## 2024-01-30 NOTE — ASSESSMENT & PLAN NOTE
History of multiple surgeries and epidural injections.  States following the surgeon for spinal nerve stimulation

## 2024-01-31 ENCOUNTER — TELEPHONE (OUTPATIENT)
Dept: INTERNAL MEDICINE | Facility: HOSPITAL | Age: 79
End: 2024-01-31
Payer: COMMERCIAL

## 2024-02-01 ENCOUNTER — NURSING HOME VISIT (OUTPATIENT)
Dept: POST ACUTE CARE | Facility: EXTERNAL LOCATION | Age: 79
End: 2024-02-01
Payer: COMMERCIAL

## 2024-02-01 DIAGNOSIS — M54.9 ACUTE BACK PAIN, UNSPECIFIED BACK LOCATION, UNSPECIFIED BACK PAIN LATERALITY: ICD-10-CM

## 2024-02-01 DIAGNOSIS — S82.891D CLOSED FRACTURE OF RIGHT ANKLE WITH ROUTINE HEALING, SUBSEQUENT ENCOUNTER: ICD-10-CM

## 2024-02-01 DIAGNOSIS — G43.909 MIGRAINE WITHOUT STATUS MIGRAINOSUS, NOT INTRACTABLE, UNSPECIFIED MIGRAINE TYPE: ICD-10-CM

## 2024-02-01 DIAGNOSIS — R53.81 PHYSICAL DECONDITIONING: Primary | ICD-10-CM

## 2024-02-01 DIAGNOSIS — M54.41 CHRONIC RIGHT-SIDED LOW BACK PAIN WITH RIGHT-SIDED SCIATICA: ICD-10-CM

## 2024-02-01 DIAGNOSIS — Z86.79 HX OF CARDIAC ARRHYTHMIA: ICD-10-CM

## 2024-02-01 DIAGNOSIS — M54.16 ACUTE RIGHT LUMBAR RADICULOPATHY: ICD-10-CM

## 2024-02-01 DIAGNOSIS — G89.29 CHRONIC RIGHT-SIDED LOW BACK PAIN WITH RIGHT-SIDED SCIATICA: ICD-10-CM

## 2024-02-01 PROCEDURE — 99309 SBSQ NF CARE MODERATE MDM 30: CPT | Performed by: FAMILY MEDICINE
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
## 2024-02-05 ENCOUNTER — NURSING HOME VISIT (OUTPATIENT)
Dept: POST ACUTE CARE | Facility: EXTERNAL LOCATION | Age: 79
End: 2024-02-05
Payer: COMMERCIAL

## 2024-02-05 DIAGNOSIS — G89.29 CHRONIC RIGHT-SIDED LOW BACK PAIN WITH RIGHT-SIDED SCIATICA: ICD-10-CM

## 2024-02-05 DIAGNOSIS — M54.41 CHRONIC RIGHT-SIDED LOW BACK PAIN WITH RIGHT-SIDED SCIATICA: ICD-10-CM

## 2024-02-05 DIAGNOSIS — M54.16 ACUTE RIGHT LUMBAR RADICULOPATHY: ICD-10-CM

## 2024-02-05 DIAGNOSIS — M54.9 ACUTE BACK PAIN, UNSPECIFIED BACK LOCATION, UNSPECIFIED BACK PAIN LATERALITY: ICD-10-CM

## 2024-02-05 DIAGNOSIS — R53.81 PHYSICAL DECONDITIONING: Primary | ICD-10-CM

## 2024-02-05 DIAGNOSIS — S82.891D CLOSED FRACTURE OF RIGHT ANKLE WITH ROUTINE HEALING, SUBSEQUENT ENCOUNTER: ICD-10-CM

## 2024-02-05 PROCEDURE — 99309 SBSQ NF CARE MODERATE MDM 30: CPT | Performed by: FAMILY MEDICINE
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
## 2024-02-09 PROBLEM — N39.0 UTI (URINARY TRACT INFECTION): Status: ACTIVE | Noted: 2024-02-09

## 2024-02-12 ENCOUNTER — NURSING HOME VISIT (OUTPATIENT)
Dept: POST ACUTE CARE | Facility: EXTERNAL LOCATION | Age: 79
End: 2024-02-12
Payer: COMMERCIAL

## 2024-02-12 DIAGNOSIS — M54.9 ACUTE BACK PAIN, UNSPECIFIED BACK LOCATION, UNSPECIFIED BACK PAIN LATERALITY: ICD-10-CM

## 2024-02-12 DIAGNOSIS — N39.0 URINARY TRACT INFECTION WITHOUT HEMATURIA, SITE UNSPECIFIED: ICD-10-CM

## 2024-02-12 DIAGNOSIS — M54.16 ACUTE RIGHT LUMBAR RADICULOPATHY: ICD-10-CM

## 2024-02-12 DIAGNOSIS — G89.29 CHRONIC RIGHT-SIDED LOW BACK PAIN WITH RIGHT-SIDED SCIATICA: ICD-10-CM

## 2024-02-12 DIAGNOSIS — M54.41 CHRONIC RIGHT-SIDED LOW BACK PAIN WITH RIGHT-SIDED SCIATICA: ICD-10-CM

## 2024-02-12 DIAGNOSIS — R53.81 PHYSICAL DECONDITIONING: Primary | ICD-10-CM

## 2024-02-12 DIAGNOSIS — S82.891D CLOSED FRACTURE OF RIGHT ANKLE WITH ROUTINE HEALING, SUBSEQUENT ENCOUNTER: ICD-10-CM

## 2024-02-12 DIAGNOSIS — G43.909 MIGRAINE WITHOUT STATUS MIGRAINOSUS, NOT INTRACTABLE, UNSPECIFIED MIGRAINE TYPE: ICD-10-CM

## 2024-02-12 DIAGNOSIS — Z86.79 HX OF CARDIAC ARRHYTHMIA: ICD-10-CM

## 2024-02-12 PROCEDURE — 99309 SBSQ NF CARE MODERATE MDM 30: CPT | Performed by: FAMILY MEDICINE
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
## 2024-02-13 ENCOUNTER — OFFICE VISIT (OUTPATIENT)
Dept: ORTHOPEDIC SURGERY | Facility: CLINIC | Age: 79
End: 2024-02-13
Payer: COMMERCIAL

## 2024-02-13 ENCOUNTER — HOSPITAL ENCOUNTER (OUTPATIENT)
Dept: RADIOLOGY | Facility: CLINIC | Age: 79
Discharge: HOME | End: 2024-02-13
Payer: COMMERCIAL

## 2024-02-13 DIAGNOSIS — M25.571 ACUTE RIGHT ANKLE PAIN: ICD-10-CM

## 2024-02-13 DIAGNOSIS — S82.891D CLOSED FRACTURE OF RIGHT ANKLE WITH ROUTINE HEALING, SUBSEQUENT ENCOUNTER: ICD-10-CM

## 2024-02-13 DIAGNOSIS — M25.571 ACUTE RIGHT ANKLE PAIN: Primary | ICD-10-CM

## 2024-02-13 PROCEDURE — 73610 X-RAY EXAM OF ANKLE: CPT | Mod: RT

## 2024-02-13 PROCEDURE — 1036F TOBACCO NON-USER: CPT | Performed by: STUDENT IN AN ORGANIZED HEALTH CARE EDUCATION/TRAINING PROGRAM

## 2024-02-13 PROCEDURE — 1157F ADVNC CARE PLAN IN RCRD: CPT | Performed by: STUDENT IN AN ORGANIZED HEALTH CARE EDUCATION/TRAINING PROGRAM

## 2024-02-13 PROCEDURE — 1125F AMNT PAIN NOTED PAIN PRSNT: CPT | Performed by: STUDENT IN AN ORGANIZED HEALTH CARE EDUCATION/TRAINING PROGRAM

## 2024-02-13 PROCEDURE — 99024 POSTOP FOLLOW-UP VISIT: CPT | Performed by: STUDENT IN AN ORGANIZED HEALTH CARE EDUCATION/TRAINING PROGRAM

## 2024-02-13 PROCEDURE — 1111F DSCHRG MED/CURRENT MED MERGE: CPT | Performed by: STUDENT IN AN ORGANIZED HEALTH CARE EDUCATION/TRAINING PROGRAM

## 2024-02-13 PROCEDURE — L4361 PNEUMA/VAC WALK BOOT PRE OTS: HCPCS | Performed by: STUDENT IN AN ORGANIZED HEALTH CARE EDUCATION/TRAINING PROGRAM

## 2024-02-13 PROCEDURE — 73610 X-RAY EXAM OF ANKLE: CPT | Mod: RIGHT SIDE | Performed by: RADIOLOGY

## 2024-02-13 PROCEDURE — 1159F MED LIST DOCD IN RCRD: CPT | Performed by: STUDENT IN AN ORGANIZED HEALTH CARE EDUCATION/TRAINING PROGRAM

## 2024-02-13 NOTE — PROGRESS NOTES
Overall reports doing well she is maintained nonweightbearing in her splint she is at his nursing facility.    GEN: Alert and Oriented x 3  Constitutional: Well appearing, in no apparent distress.    Right ankle examined incision is healed sutures removed Steri-Strips placed minimal swelling no calf tenderness or swelling    Slight perincisional numbness otherwise sensation intact sural/saphenous/superficial and deep peroneal/tibial nerve distributions Motor intact foot DF/PF/KF/KE/EHL/FHL/Peroneals  palpable DP and PT pulse, foot warm and perfused      X-rays status post ORIF of right ankle fracture with anatomic alignment    Patient was prescribed a [cam boot for [ankle fracture].The patient is ambulatory with or without aid; but, has weakness, instability and/or deformity of their [Right  ankle which requires stabilization from this orthosis to improve their function. Verbal and written instructions for the use, wear schedule, cleaning and application of this item were given.  Patient was instructed that should the brace result in increased pain, decreased sensation, increased swelling, or an overall worsening of their medical condition, to please contact our office immediately. Orthotic management and training was provided for skin care, modifications due to healing tissues, edema changes, interruption in skin integrity, and safety precautions with the orthosis.      She may be weightbearing as tolerated in the boot begin therapy for range of motion and edema control follow-up in 4 weeks all questions answered, patient in agreement with the plan.   [No Acute Distress] : no acute distress [NL] : nonerythematous oropharynx [Tenderness with Palpation] : tenderness with palpation
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
## 2024-02-19 ENCOUNTER — ANESTHESIA EVENT (OUTPATIENT)
Dept: RADIOLOGY | Facility: HOSPITAL | Age: 79
End: 2024-02-19

## 2024-02-19 ENCOUNTER — APPOINTMENT (OUTPATIENT)
Dept: RADIOLOGY | Facility: HOSPITAL | Age: 79
End: 2024-02-19
Payer: COMMERCIAL

## 2024-02-19 ENCOUNTER — HOSPITAL ENCOUNTER (OUTPATIENT)
Dept: RADIOLOGY | Facility: HOSPITAL | Age: 79
End: 2024-02-19
Payer: COMMERCIAL

## 2024-02-19 ENCOUNTER — ANESTHESIA (OUTPATIENT)
Dept: RADIOLOGY | Facility: HOSPITAL | Age: 79
End: 2024-02-19
Payer: COMMERCIAL

## 2024-02-26 ENCOUNTER — NURSING HOME VISIT (OUTPATIENT)
Dept: POST ACUTE CARE | Facility: EXTERNAL LOCATION | Age: 79
End: 2024-02-26
Payer: COMMERCIAL

## 2024-02-26 DIAGNOSIS — M54.16 ACUTE RIGHT LUMBAR RADICULOPATHY: ICD-10-CM

## 2024-02-26 DIAGNOSIS — S82.891D CLOSED FRACTURE OF RIGHT ANKLE WITH ROUTINE HEALING, SUBSEQUENT ENCOUNTER: ICD-10-CM

## 2024-02-26 DIAGNOSIS — N39.0 URINARY TRACT INFECTION WITHOUT HEMATURIA, SITE UNSPECIFIED: ICD-10-CM

## 2024-02-26 DIAGNOSIS — G43.909 MIGRAINE WITHOUT STATUS MIGRAINOSUS, NOT INTRACTABLE, UNSPECIFIED MIGRAINE TYPE: ICD-10-CM

## 2024-02-26 DIAGNOSIS — M54.41 CHRONIC RIGHT-SIDED LOW BACK PAIN WITH RIGHT-SIDED SCIATICA: ICD-10-CM

## 2024-02-26 DIAGNOSIS — G89.29 CHRONIC RIGHT-SIDED LOW BACK PAIN WITH RIGHT-SIDED SCIATICA: ICD-10-CM

## 2024-02-26 DIAGNOSIS — R53.81 PHYSICAL DECONDITIONING: Primary | ICD-10-CM

## 2024-02-26 DIAGNOSIS — F45.42 PAIN DISORDER ASSOCIATED WITH PSYCHOLOGICAL AND PHYSICAL FACTORS: ICD-10-CM

## 2024-02-26 DIAGNOSIS — M54.9 ACUTE BACK PAIN, UNSPECIFIED BACK LOCATION, UNSPECIFIED BACK PAIN LATERALITY: ICD-10-CM

## 2024-02-26 PROCEDURE — 99316 NF DSCHRG MGMT 30 MIN+: CPT | Performed by: FAMILY MEDICINE
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
## 2024-02-27 ENCOUNTER — PATIENT OUTREACH (OUTPATIENT)
Dept: PRIMARY CARE | Facility: CLINIC | Age: 79
End: 2024-02-27
Payer: COMMERCIAL

## 2024-02-27 RX ORDER — HYDROCODONE BITARTRATE AND ACETAMINOPHEN 5; 325 MG/1; MG/1
1 TABLET ORAL EVERY 6 HOURS PRN
Qty: 20 TABLET | Refills: 0 | Status: CANCELLED | OUTPATIENT
Start: 2024-02-27 | End: 2024-03-05
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
## 2024-02-28 NOTE — ASSESSMENT & PLAN NOTE
Which is what brought her to the hospital to begin with. C/w Gabapentin, Doxepin, Cymbalta, prn percocet, had MRI scheduled at main campus, do not see that MRI was done

## 2024-02-28 NOTE — ASSESSMENT & PLAN NOTE
Bimalleolar fx right S/p ORIF right ankle.  Had f/u with ortho on 2/13.  Currently in CAM boot and is WBAT.  She was unaware on admission to hospital that she had a fracture.

## 2024-03-01 ENCOUNTER — OFFICE VISIT (OUTPATIENT)
Dept: ORTHOPEDIC SURGERY | Facility: CLINIC | Age: 79
End: 2024-03-01
Payer: COMMERCIAL

## 2024-03-01 ENCOUNTER — HOSPITAL ENCOUNTER (OUTPATIENT)
Dept: RADIOLOGY | Facility: CLINIC | Age: 79
Discharge: HOME | End: 2024-03-01
Payer: COMMERCIAL

## 2024-03-01 DIAGNOSIS — S82.891D CLOSED FRACTURE OF RIGHT ANKLE WITH ROUTINE HEALING, SUBSEQUENT ENCOUNTER: ICD-10-CM

## 2024-03-01 PROCEDURE — 99024 POSTOP FOLLOW-UP VISIT: CPT | Performed by: STUDENT IN AN ORGANIZED HEALTH CARE EDUCATION/TRAINING PROGRAM

## 2024-03-01 PROCEDURE — 1157F ADVNC CARE PLAN IN RCRD: CPT | Performed by: STUDENT IN AN ORGANIZED HEALTH CARE EDUCATION/TRAINING PROGRAM

## 2024-03-01 PROCEDURE — 1125F AMNT PAIN NOTED PAIN PRSNT: CPT | Performed by: STUDENT IN AN ORGANIZED HEALTH CARE EDUCATION/TRAINING PROGRAM

## 2024-03-01 PROCEDURE — 1036F TOBACCO NON-USER: CPT | Performed by: STUDENT IN AN ORGANIZED HEALTH CARE EDUCATION/TRAINING PROGRAM

## 2024-03-01 PROCEDURE — 73610 X-RAY EXAM OF ANKLE: CPT | Mod: RIGHT SIDE | Performed by: RADIOLOGY

## 2024-03-01 PROCEDURE — 1159F MED LIST DOCD IN RCRD: CPT | Performed by: STUDENT IN AN ORGANIZED HEALTH CARE EDUCATION/TRAINING PROGRAM

## 2024-03-01 PROCEDURE — 73610 X-RAY EXAM OF ANKLE: CPT | Mod: RT

## 2024-03-01 ASSESSMENT — PAIN - FUNCTIONAL ASSESSMENT: PAIN_FUNCTIONAL_ASSESSMENT: 0-10

## 2024-03-01 ASSESSMENT — PAIN SCALES - GENERAL: PAINLEVEL_OUTOF10: 10 - WORST POSSIBLE PAIN

## 2024-03-04 NOTE — PROGRESS NOTES
She is now been discharged home.  She reports she is having pain in the ankle there is been no injury.  She was doing therapy at the facility but has not started since she has been home..    GEN: Alert and Oriented x 3  Constitutional: Well appearing, in no apparent distress.    Right ankle examined incision is healed sutures removed Steri-Strips placed minimal swelling no calf tenderness or swelling    Slight perincisional numbness otherwise sensation intact sural/saphenous/superficial and deep peroneal/tibial nerve distributions Motor intact foot DF/PF/KF/KE/EHL/FHL/Peroneals  palpable DP and PT pulse, foot warm and perfused      X-rays status post ORIF of right ankle fracture with anatomic alignment, healed        I reviewed with the patient that her fracture is healed.  Encouraged her to initiate outpatient physical therapy.  Will see her back in 6 weeks All questions answered, patient in agreement with the plan.

## 2024-03-12 ENCOUNTER — APPOINTMENT (OUTPATIENT)
Dept: ORTHOPEDIC SURGERY | Facility: CLINIC | Age: 79
End: 2024-03-12
Payer: COMMERCIAL

## 2024-04-12 ENCOUNTER — APPOINTMENT (OUTPATIENT)
Dept: ORTHOPEDIC SURGERY | Facility: CLINIC | Age: 79
End: 2024-04-12
Payer: COMMERCIAL

## 2024-06-03 DIAGNOSIS — M48.02 SPINAL STENOSIS OF CERVICAL REGION: ICD-10-CM

## 2024-06-03 RX ORDER — TOPIRAMATE 100 MG/1
100 TABLET, FILM COATED ORAL EVERY MORNING
Qty: 1 TABLET | Refills: 3 | Status: SHIPPED | OUTPATIENT
Start: 2024-06-03

## 2024-06-04 DIAGNOSIS — M54.16 SPINAL STENOSIS OF LUMBAR REGION WITH RADICULOPATHY: ICD-10-CM

## 2024-06-04 DIAGNOSIS — M48.061 SPINAL STENOSIS OF LUMBAR REGION WITH RADICULOPATHY: ICD-10-CM

## 2024-06-05 RX ORDER — GABAPENTIN 800 MG/1
800 TABLET ORAL 3 TIMES DAILY
Qty: 90 TABLET | Refills: 3 | Status: SHIPPED | OUTPATIENT
Start: 2024-06-05

## 2024-09-05 DIAGNOSIS — M48.02 SPINAL STENOSIS OF CERVICAL REGION: ICD-10-CM

## 2024-09-05 RX ORDER — TOPIRAMATE 100 MG/1
TABLET, FILM COATED ORAL
Qty: 90 TABLET | Refills: 6 | Status: SHIPPED | OUTPATIENT
Start: 2024-09-05

## 2024-11-20 DIAGNOSIS — M54.16 SPINAL STENOSIS OF LUMBAR REGION WITH RADICULOPATHY: ICD-10-CM

## 2024-11-20 DIAGNOSIS — M48.061 SPINAL STENOSIS OF LUMBAR REGION WITH RADICULOPATHY: ICD-10-CM

## 2024-11-20 RX ORDER — GABAPENTIN 800 MG/1
800 TABLET ORAL 3 TIMES DAILY
Qty: 90 TABLET | Refills: 3 | Status: SHIPPED | OUTPATIENT
Start: 2024-11-20

## 2025-03-11 DIAGNOSIS — M48.02 SPINAL STENOSIS OF CERVICAL REGION: ICD-10-CM

## 2025-03-11 RX ORDER — TOPIRAMATE 100 MG/1
TABLET, FILM COATED ORAL
Qty: 90 TABLET | Refills: 6 | Status: SHIPPED | OUTPATIENT
Start: 2025-03-11

## 2025-04-25 DIAGNOSIS — M48.061 SPINAL STENOSIS OF LUMBAR REGION WITH RADICULOPATHY: ICD-10-CM

## 2025-04-25 DIAGNOSIS — M54.16 SPINAL STENOSIS OF LUMBAR REGION WITH RADICULOPATHY: ICD-10-CM

## 2025-04-28 RX ORDER — GABAPENTIN 800 MG/1
800 TABLET ORAL 3 TIMES DAILY
Qty: 90 TABLET | Refills: 0 | Status: SHIPPED | OUTPATIENT
Start: 2025-04-28 | End: 2025-04-29 | Stop reason: SDUPTHER

## 2025-04-29 ENCOUNTER — TELEPHONE (OUTPATIENT)
Dept: PAIN MEDICINE | Facility: HOSPITAL | Age: 80
End: 2025-04-29
Payer: COMMERCIAL

## 2025-04-29 DIAGNOSIS — M54.16 SPINAL STENOSIS OF LUMBAR REGION WITH RADICULOPATHY: ICD-10-CM

## 2025-04-29 DIAGNOSIS — M48.02 SPINAL STENOSIS OF CERVICAL REGION: ICD-10-CM

## 2025-04-29 DIAGNOSIS — M48.061 SPINAL STENOSIS OF LUMBAR REGION WITH RADICULOPATHY: ICD-10-CM

## 2025-04-29 RX ORDER — GABAPENTIN 800 MG/1
800 TABLET ORAL 3 TIMES DAILY
Qty: 90 TABLET | Refills: 0 | Status: SHIPPED | OUTPATIENT
Start: 2025-04-29

## 2025-04-29 RX ORDER — TOPIRAMATE 100 MG/1
100 TABLET, FILM COATED ORAL 2 TIMES DAILY
Qty: 60 TABLET | Refills: 0 | Status: SHIPPED | OUTPATIENT
Start: 2025-04-29 | End: 2025-06-28

## 2025-05-09 ENCOUNTER — TELEPHONE (OUTPATIENT)
Dept: PAIN MEDICINE | Facility: CLINIC | Age: 80
End: 2025-05-09

## 2025-05-09 ENCOUNTER — APPOINTMENT (OUTPATIENT)
Dept: PAIN MEDICINE | Facility: CLINIC | Age: 80
End: 2025-05-09
Payer: COMMERCIAL

## 2025-05-16 DIAGNOSIS — M48.02 SPINAL STENOSIS OF CERVICAL REGION: ICD-10-CM

## 2025-05-16 RX ORDER — TOPIRAMATE 100 MG/1
100 TABLET, FILM COATED ORAL 2 TIMES DAILY
Qty: 60 TABLET | Refills: 0 | Status: SHIPPED | OUTPATIENT
Start: 2025-05-16 | End: 2025-07-15

## (undated) DEVICE — DRESSING, GAUZE, PETROLATUM, PATCH, XEROFORM, 1 X 8 IN, STERILE

## (undated) DEVICE — SUTURE, MONOCRYL, 4-0, 27 IN, PS-2, UNDYED

## (undated) DEVICE — ADHESIVE, DERMABOND, PRINEO, 12 X 9, STERILE

## (undated) DEVICE — SUTURE, VICRYL, 3-0, 27 IN, SH

## (undated) DEVICE — APPLICATOR, CHLORAPREP, W/ORANGE TINT, 26ML

## (undated) DEVICE — DRILL BIT, 2.5 MM

## (undated) DEVICE — COVER, C-ARM W/CLIPS, OEC GE

## (undated) DEVICE — SUTURE, MONOCRYL, 3-0, 27 IN, SH, UNDYED

## (undated) DEVICE — SCREW, LOW PROFILE, CORTICAL, 3.5 X 14MM, SS: Type: IMPLANTABLE DEVICE | Site: ANKLE | Status: NON-FUNCTIONAL

## (undated) DEVICE — PADDING, UNDERCAST, WEBRIL, 4 IN X 4 YD, REG, NS

## (undated) DEVICE — DRILL BIT, 3.5MM

## (undated) DEVICE — TIP, SUCTION, YANKAUER, FLEXIBLE

## (undated) DEVICE — DRILL BIT, 2.5MM CALIBRATED

## (undated) DEVICE — BANDAGE, COFLEX, 6 X 5 YDS, FOAM TAN, STERILE, LF

## (undated) DEVICE — GLOVE, SURGICAL, PROTEXIS PI W/NEU-THERA, 8.0, PF, LF

## (undated) DEVICE — DRILL BIT, 2.0MM CALIBRATED

## (undated) DEVICE — PADDING, WEBRIL, UNDERCAST, STERILE, 6 IN

## (undated) DEVICE — GLOVE, SURGICAL, PROTEXIS PI MICRO, 8.0, PF, LF

## (undated) DEVICE — BANDAGE, ESMARK, 6 IN X 9 FT, STERILE

## (undated) DEVICE — BB-TAK, THREADED

## (undated) DEVICE — Device